# Patient Record
Sex: MALE | Race: BLACK OR AFRICAN AMERICAN | NOT HISPANIC OR LATINO | Employment: FULL TIME | URBAN - METROPOLITAN AREA
[De-identification: names, ages, dates, MRNs, and addresses within clinical notes are randomized per-mention and may not be internally consistent; named-entity substitution may affect disease eponyms.]

---

## 2019-03-25 ENCOUNTER — HISTORICAL (OUTPATIENT)
Dept: ADMINISTRATIVE | Facility: HOSPITAL | Age: 34
End: 2019-03-25

## 2019-03-25 LAB
ABS NEUT (OLG): 4.83 X10(3)/MCL (ref 2.1–9.2)
ALBUMIN SERPL-MCNC: 3.8 GM/DL (ref 3.4–5)
ALBUMIN/GLOB SERPL: 0.7 RATIO (ref 1.1–2)
ALP SERPL-CCNC: 76 UNIT/L (ref 45–117)
ALT SERPL-CCNC: 78 UNIT/L (ref 12–78)
APPEARANCE, UA: CLEAR
AST SERPL-CCNC: 30 UNIT/L (ref 15–37)
BACTERIA #/AREA URNS AUTO: ABNORMAL /[HPF]
BASOPHILS # BLD AUTO: 0.04 X10(3)/MCL
BASOPHILS NFR BLD AUTO: 0 %
BILIRUB SERPL-MCNC: 0.3 MG/DL (ref 0.2–1)
BILIRUB UR QL STRIP: NEGATIVE
BILIRUBIN DIRECT+TOT PNL SERPL-MCNC: <0.1 MG/DL
BILIRUBIN DIRECT+TOT PNL SERPL-MCNC: ABNORMAL MG/DL
BUN SERPL-MCNC: 10 MG/DL (ref 7–18)
CALCIUM SERPL-MCNC: 9.2 MG/DL (ref 8.5–10.1)
CHLORIDE SERPL-SCNC: 100 MMOL/L (ref 98–107)
CHOLEST SERPL-MCNC: 174 MG/DL
CHOLEST/HDLC SERPL: 4.5 {RATIO} (ref 0–5)
CO2 SERPL-SCNC: 30 MMOL/L (ref 21–32)
COLOR UR: ABNORMAL
CREAT SERPL-MCNC: 1 MG/DL (ref 0.6–1.3)
CREAT UR-MCNC: 105 MG/DL
EOSINOPHIL # BLD AUTO: 0.18 10*3/UL
EOSINOPHIL NFR BLD AUTO: 2 %
ERYTHROCYTE [DISTWIDTH] IN BLOOD BY AUTOMATED COUNT: 12.3 % (ref 11.5–14.5)
GLOBULIN SER-MCNC: 5.1 GM/ML (ref 2.3–3.5)
GLUCOSE (UA): >1000 MG/DL
GLUCOSE SERPL-MCNC: 298 MG/DL (ref 74–106)
HCT VFR BLD AUTO: 43.8 % (ref 40–51)
HDLC SERPL-MCNC: 39 MG/DL
HGB BLD-MCNC: 14.4 GM/DL (ref 13.5–17.5)
HGB UR QL STRIP: 0.06 MG/DL
HYALINE CASTS #/AREA URNS LPF: ABNORMAL /[LPF]
IMM GRANULOCYTES # BLD AUTO: 0.05 10*3/UL
IMM GRANULOCYTES NFR BLD AUTO: 0 %
KETONES UR QL STRIP: ABNORMAL
LDLC SERPL CALC-MCNC: 107 MG/DL (ref 0–130)
LEUKOCYTE ESTERASE UR QL STRIP: 500 LEU/UL
LYMPHOCYTES # BLD AUTO: 3.4 X10(3)/MCL
LYMPHOCYTES NFR BLD AUTO: 37 % (ref 13–40)
MCH RBC QN AUTO: 30.1 PG (ref 26–34)
MCHC RBC AUTO-ENTMCNC: 32.9 GM/DL (ref 31–37)
MCV RBC AUTO: 91.6 FL (ref 80–100)
MICROALBUMIN UR-MCNC: 35.5 MG/L (ref 0–19)
MICROALBUMIN/CREAT RATIO PNL UR: 33.8 MCG/MG CR (ref 0–29)
MONOCYTES # BLD AUTO: 0.74 X10(3)/MCL
MONOCYTES NFR BLD AUTO: 8 % (ref 4–12)
NEUTROPHILS # BLD AUTO: 4.83 X10(3)/MCL
NEUTROPHILS NFR BLD AUTO: 52 X10(3)/MCL
NITRITE UR QL STRIP: NEGATIVE
PH UR STRIP: 6.5 [PH] (ref 4.5–8)
PLATELET # BLD AUTO: 285 X10(3)/MCL (ref 130–400)
PMV BLD AUTO: 10.9 FL (ref 7.4–10.4)
POTASSIUM SERPL-SCNC: 4 MMOL/L (ref 3.5–5.1)
PROT SERPL-MCNC: 8.9 GM/DL (ref 6.4–8.2)
PROT UR QL STRIP: 10 MG/DL
RBC # BLD AUTO: 4.78 X10(6)/MCL (ref 4.5–5.9)
RBC #/AREA URNS AUTO: ABNORMAL /[HPF]
SODIUM SERPL-SCNC: 133 MMOL/L (ref 136–145)
SP GR UR STRIP: 1.03 (ref 1–1.03)
SQUAMOUS #/AREA URNS LPF: ABNORMAL /[LPF]
TRIGL SERPL-MCNC: 139 MG/DL
TSH SERPL-ACNC: 2.85 MIU/L (ref 0.36–3.74)
UROBILINOGEN UR STRIP-ACNC: NORMAL
VLDLC SERPL CALC-MCNC: 28 MG/DL
WBC # SPEC AUTO: 9.2 X10(3)/MCL (ref 4.5–11)
WBC #/AREA URNS AUTO: ABNORMAL /HPF

## 2019-05-07 ENCOUNTER — HISTORICAL (OUTPATIENT)
Dept: INTERNAL MEDICINE | Facility: CLINIC | Age: 34
End: 2019-05-07

## 2019-05-07 LAB
APPEARANCE, UA: CLEAR
BACTERIA #/AREA URNS AUTO: ABNORMAL /[HPF]
BILIRUB UR QL STRIP: NEGATIVE
BUN SERPL-MCNC: 12 MG/DL (ref 7–18)
CALCIUM SERPL-MCNC: 9.1 MG/DL (ref 8.5–10.1)
CHLORIDE SERPL-SCNC: 105 MMOL/L (ref 98–107)
CO2 SERPL-SCNC: 27 MMOL/L (ref 21–32)
COLOR UR: YELLOW
CREAT SERPL-MCNC: 1 MG/DL (ref 0.6–1.3)
CREAT UR-MCNC: 228 MG/DL
CREAT/UREA NIT SERPL: 12
GLUCOSE (UA): NORMAL
GLUCOSE SERPL-MCNC: 207 MG/DL (ref 74–106)
HGB UR QL STRIP: 0.06 MG/DL
HYALINE CASTS #/AREA URNS LPF: ABNORMAL /[LPF]
KETONES UR QL STRIP: NEGATIVE
LEUKOCYTE ESTERASE UR QL STRIP: 500 LEU/UL
MICROALBUMIN UR-MCNC: 49.4 MG/L (ref 0–19)
MICROALBUMIN/CREAT RATIO PNL UR: 21.7 MCG/MG CR (ref 0–29)
NITRITE UR QL STRIP: NEGATIVE
PH UR STRIP: 6 [PH] (ref 4.5–8)
POTASSIUM SERPL-SCNC: 4 MMOL/L (ref 3.5–5.1)
PROT UR QL STRIP: 20 MG/DL
RBC #/AREA URNS AUTO: ABNORMAL /[HPF]
SODIUM SERPL-SCNC: 136 MMOL/L (ref 136–145)
SP GR UR STRIP: 1.03 (ref 1–1.03)
SQUAMOUS #/AREA URNS LPF: ABNORMAL /[LPF]
UROBILINOGEN UR STRIP-ACNC: NORMAL
WBC #/AREA URNS AUTO: ABNORMAL /HPF

## 2020-09-28 ENCOUNTER — HISTORICAL (OUTPATIENT)
Dept: ADMINISTRATIVE | Facility: HOSPITAL | Age: 35
End: 2020-09-28

## 2020-09-28 LAB
ABS NEUT (OLG): 3.71 X10(3)/MCL (ref 2.1–9.2)
ALBUMIN SERPL-MCNC: 3.8 GM/DL (ref 3.4–5)
ALBUMIN/GLOB SERPL: 0.7 RATIO (ref 1.1–2)
ALP SERPL-CCNC: 81 UNIT/L (ref 45–117)
ALT SERPL-CCNC: 67 UNIT/L (ref 12–78)
APPEARANCE, UA: ABNORMAL
AST SERPL-CCNC: 34 UNIT/L (ref 15–37)
BACTERIA #/AREA URNS AUTO: ABNORMAL /HPF
BASOPHILS # BLD AUTO: 0 X10(3)/MCL (ref 0–0.2)
BASOPHILS NFR BLD AUTO: 1 %
BILIRUB SERPL-MCNC: 0.3 MG/DL (ref 0.2–1)
BILIRUB UR QL STRIP: NEGATIVE
BILIRUBIN DIRECT+TOT PNL SERPL-MCNC: 0.1 MG/DL (ref 0–0.2)
BILIRUBIN DIRECT+TOT PNL SERPL-MCNC: 0.2 MG/DL
BUN SERPL-MCNC: 10 MG/DL (ref 7–18)
CALCIUM SERPL-MCNC: 8.9 MG/DL (ref 8.5–10.1)
CHLORIDE SERPL-SCNC: 100 MMOL/L (ref 98–107)
CHOLEST SERPL-MCNC: 201 MG/DL
CHOLEST/HDLC SERPL: 4.8 {RATIO} (ref 0–5)
CO2 SERPL-SCNC: 23 MMOL/L (ref 21–32)
COLOR UR: ABNORMAL
CREAT SERPL-MCNC: 1 MG/DL (ref 0.6–1.3)
CREAT UR-MCNC: 69 MG/DL
EOSINOPHIL # BLD AUTO: 0.1 X10(3)/MCL (ref 0–0.9)
EOSINOPHIL NFR BLD AUTO: 1 %
ERYTHROCYTE [DISTWIDTH] IN BLOOD BY AUTOMATED COUNT: 12.7 % (ref 11.5–14.5)
GLOBULIN SER-MCNC: 5.2 GM/ML (ref 2.3–3.5)
GLUCOSE (UA): 1000 MG/DL
GLUCOSE SERPL-MCNC: 372 MG/DL (ref 74–106)
HCT VFR BLD AUTO: 43.6 % (ref 40–51)
HDLC SERPL-MCNC: 42 MG/DL (ref 40–59)
HGB BLD-MCNC: 14.6 GM/DL (ref 13.5–17.5)
HGB UR QL STRIP: 0.2
HYALINE CASTS #/AREA URNS LPF: ABNORMAL /LPF
IMM GRANULOCYTES # BLD AUTO: 0.03 10*3/UL
IMM GRANULOCYTES NFR BLD AUTO: 0 %
KETONES UR QL STRIP: NEGATIVE
LDLC SERPL CALC-MCNC: 132 MG/DL
LEUKOCYTE ESTERASE UR QL STRIP: 500 LEU/UL
LYMPHOCYTES # BLD AUTO: 2.7 X10(3)/MCL (ref 0.6–4.6)
LYMPHOCYTES NFR BLD AUTO: 39 %
MCH RBC QN AUTO: 29.9 PG (ref 26–34)
MCHC RBC AUTO-ENTMCNC: 33.5 GM/DL (ref 31–37)
MCV RBC AUTO: 89.3 FL (ref 80–100)
MICROALBUMIN UR-MCNC: 142 MG/L (ref 0–19)
MICROALBUMIN/CREAT RATIO PNL UR: 205.8 MCG/MG CR (ref 0–29)
MONOCYTES # BLD AUTO: 0.4 X10(3)/MCL (ref 0.1–1.3)
MONOCYTES NFR BLD AUTO: 6 %
NEUTROPHILS # BLD AUTO: 3.71 X10(3)/MCL (ref 2.1–9.2)
NEUTROPHILS NFR BLD AUTO: 53 %
NITRITE UR QL STRIP: NEGATIVE
PH UR STRIP: 5.5 [PH] (ref 4.5–8)
PLATELET # BLD AUTO: 273 X10(3)/MCL (ref 130–400)
PMV BLD AUTO: 10.8 FL (ref 7.4–10.4)
POTASSIUM SERPL-SCNC: 4.1 MMOL/L (ref 3.5–5.1)
PROT SERPL-MCNC: 9 GM/DL (ref 6.4–8.2)
PROT UR QL STRIP: 20 MG/DL
RBC # BLD AUTO: 4.88 X10(6)/MCL (ref 4.5–5.9)
RBC #/AREA URNS AUTO: ABNORMAL /HPF
SODIUM SERPL-SCNC: 133 MMOL/L (ref 136–145)
SP GR UR STRIP: 1.04 (ref 1–1.03)
SQUAMOUS #/AREA URNS LPF: ABNORMAL /LPF
TRIGL SERPL-MCNC: 133 MG/DL
TSH SERPL-ACNC: 1.84 MIU/L (ref 0.36–3.74)
UROBILINOGEN UR STRIP-ACNC: NORMAL
VLDLC SERPL CALC-MCNC: 27 MG/DL
WBC # SPEC AUTO: 7 X10(3)/MCL (ref 4.5–11)
WBC #/AREA URNS AUTO: >=100 /HPF

## 2021-05-12 ENCOUNTER — HISTORICAL (OUTPATIENT)
Dept: INTERNAL MEDICINE | Facility: CLINIC | Age: 36
End: 2021-05-12

## 2021-05-12 LAB
ALBUMIN SERPL-MCNC: 3.7 GM/DL (ref 3.5–5)
ALBUMIN/GLOB SERPL: 0.9 RATIO (ref 1.1–2)
ALP SERPL-CCNC: 75 UNIT/L (ref 40–150)
ALT SERPL-CCNC: 76 UNIT/L (ref 0–55)
AST SERPL-CCNC: 47 UNIT/L (ref 5–34)
BILIRUB SERPL-MCNC: 0.4 MG/DL
BILIRUBIN DIRECT+TOT PNL SERPL-MCNC: 0.2 MG/DL (ref 0–0.5)
BILIRUBIN DIRECT+TOT PNL SERPL-MCNC: 0.2 MG/DL (ref 0–0.8)
BUN SERPL-MCNC: 9.9 MG/DL (ref 8.9–20.6)
CALCIUM SERPL-MCNC: 9.3 MG/DL (ref 8.4–10.2)
CHLORIDE SERPL-SCNC: 101 MMOL/L (ref 98–107)
CO2 SERPL-SCNC: 23 MMOL/L (ref 22–29)
CREAT SERPL-MCNC: 1 MG/DL (ref 0.73–1.18)
EST. AVERAGE GLUCOSE BLD GHB EST-MCNC: >355.1 MG/DL
GLOBULIN SER-MCNC: 4.3 GM/DL (ref 2.4–3.5)
GLUCOSE SERPL-MCNC: 375 MG/DL (ref 74–100)
HBA1C MFR BLD: >14 %
POTASSIUM SERPL-SCNC: 4.5 MMOL/L (ref 3.5–5.1)
PROT SERPL-MCNC: 8 GM/DL (ref 6.4–8.3)
SODIUM SERPL-SCNC: 134 MMOL/L (ref 136–145)

## 2021-07-16 ENCOUNTER — HISTORICAL (OUTPATIENT)
Dept: INTERNAL MEDICINE | Facility: CLINIC | Age: 36
End: 2021-07-16

## 2021-07-16 LAB
ABS NEUT (OLG): 4.24 X10(3)/MCL (ref 2.1–9.2)
ALBUMIN SERPL-MCNC: 3.8 GM/DL (ref 3.5–5)
ALBUMIN/GLOB SERPL: 0.9 RATIO (ref 1.1–2)
ALP SERPL-CCNC: 66 UNIT/L (ref 40–150)
ALT SERPL-CCNC: 49 UNIT/L (ref 0–55)
AST SERPL-CCNC: 27 UNIT/L (ref 5–34)
BASOPHILS # BLD AUTO: 0 X10(3)/MCL (ref 0–0.2)
BASOPHILS NFR BLD AUTO: 0 %
BILIRUB SERPL-MCNC: 0.4 MG/DL
BILIRUBIN DIRECT+TOT PNL SERPL-MCNC: 0.2 MG/DL (ref 0–0.5)
BILIRUBIN DIRECT+TOT PNL SERPL-MCNC: 0.2 MG/DL (ref 0–0.8)
BUN SERPL-MCNC: 9.7 MG/DL (ref 8.9–20.6)
CALCIUM SERPL-MCNC: 9.5 MG/DL (ref 8.4–10.2)
CHLORIDE SERPL-SCNC: 101 MMOL/L (ref 98–107)
CO2 SERPL-SCNC: 28 MMOL/L (ref 22–29)
CREAT SERPL-MCNC: 0.98 MG/DL (ref 0.73–1.18)
EOSINOPHIL # BLD AUTO: 0.2 X10(3)/MCL (ref 0–0.9)
EOSINOPHIL NFR BLD AUTO: 2 %
ERYTHROCYTE [DISTWIDTH] IN BLOOD BY AUTOMATED COUNT: 12.8 % (ref 11.5–14.5)
EST. AVERAGE GLUCOSE BLD GHB EST-MCNC: 274.7 MG/DL
GLOBULIN SER-MCNC: 4.4 GM/DL (ref 2.4–3.5)
GLUCOSE SERPL-MCNC: 220 MG/DL (ref 74–100)
HBA1C MFR BLD: 11.2 %
HCT VFR BLD AUTO: 42.2 % (ref 40–51)
HGB BLD-MCNC: 13.9 GM/DL (ref 13.5–17.5)
IMM GRANULOCYTES # BLD AUTO: 0.02 10*3/UL
IMM GRANULOCYTES NFR BLD AUTO: 0 %
LYMPHOCYTES # BLD AUTO: 4.8 X10(3)/MCL (ref 0.6–4.6)
LYMPHOCYTES NFR BLD AUTO: 48 %
MCH RBC QN AUTO: 29.8 PG (ref 26–34)
MCHC RBC AUTO-ENTMCNC: 32.9 GM/DL (ref 31–37)
MCV RBC AUTO: 90.4 FL (ref 80–100)
MONOCYTES # BLD AUTO: 0.7 X10(3)/MCL (ref 0.1–1.3)
MONOCYTES NFR BLD AUTO: 7 %
NEUTROPHILS # BLD AUTO: 4.24 X10(3)/MCL (ref 2.1–9.2)
NEUTROPHILS NFR BLD AUTO: 43 %
NRBC BLD AUTO-RTO: 0 % (ref 0–0.2)
PLATELET # BLD AUTO: 321 X10(3)/MCL (ref 130–400)
PMV BLD AUTO: 10.5 FL (ref 7.4–10.4)
POTASSIUM SERPL-SCNC: 4.3 MMOL/L (ref 3.5–5.1)
PROT SERPL-MCNC: 8.2 GM/DL (ref 6.4–8.3)
RBC # BLD AUTO: 4.67 X10(6)/MCL (ref 4.5–5.9)
SODIUM SERPL-SCNC: 136 MMOL/L (ref 136–145)
WBC # SPEC AUTO: 9.9 X10(3)/MCL (ref 4.5–11)

## 2022-04-10 ENCOUNTER — HISTORICAL (OUTPATIENT)
Dept: ADMINISTRATIVE | Facility: HOSPITAL | Age: 37
End: 2022-04-10
Payer: MEDICAID

## 2022-04-25 VITALS
DIASTOLIC BLOOD PRESSURE: 80 MMHG | WEIGHT: 315 LBS | OXYGEN SATURATION: 96 % | HEIGHT: 69 IN | BODY MASS INDEX: 46.65 KG/M2 | SYSTOLIC BLOOD PRESSURE: 132 MMHG

## 2022-05-02 ENCOUNTER — TELEPHONE (OUTPATIENT)
Dept: HEPATOLOGY | Facility: HOSPITAL | Age: 37
End: 2022-05-02
Payer: MEDICAID

## 2022-05-02 NOTE — TELEPHONE ENCOUNTER
----- Message from Nydia Saravia LPN sent at 5/2/2022 11:06 AM CDT -----  Regarding: refills  Pt. Asking for refills on insulin needles. Last visit 3/3/22.

## 2022-05-03 NOTE — HISTORICAL [facility] CERNER
This is a historical note converted from [facility]. Formatting and pictures may have been removed.  Please reference [facility] for original formatting and attached multimedia. Chief Complaint  follow up,med refill  History of Present Illness  Pt is a?35 Year?old?AA Male here for f/u visit. PMH HTN, uncontrolled DM, morbid obesity, balanitis/phimosis and noncompliance.?States had eyes checked 2 weeks age at [facility]; consent signed for records. Pt reported to ED on 7/27 for throat pain, was dxd with tonsillitis, completed abx as prescribed and has resolved.?States has been out of?insulin x 1 month and metformin x 3 months. Does not following ADA/dash diet as instructed. Does not check CBGs as instructed. Pt no showed for diabetes education. Declines flu vaccine today. Denies chest pain, shortness of breath,?cough, fever, headache,?dizziness, weakness, abdominal pain, nausea,?vomiting, diarrhea, constipation, dysuria, depression, anxiety, SI/HI.  Review of Systems  Constitutional: negative except as stated in HPI  Eye: negative except as stated in HPI  ENT: negative except as stated in HPI  Respiratory: negative except as stated in HPI  Cardiovascular: negative except as stated in HPI  Gastrointestinal: negative except as stated in HPI  Genitourinary: negative except as stated in HPI  Heme/Lymph: negative except as stated in HPI  Endocrine: negative except as stated in HPI  Immunologic: negative except as stated in HPI  Musculoskeletal: negative except as stated in HPI  Integumentary: negative except as stated in HPI  Neurologic: negative except as stated in HPI  ?   All Other ROS_ negative except as stated in HPI  Physical Exam  Vitals & Measurements  T:?36.9? ?C (Oral)? HR:?78(Peripheral)? RR:?18? BP:?138/87?  HT:?175.00?cm? WT:?140.700?kg? BMI:?45.94?  General: Alert and oriented, No acute distress.  Head: Normocephalic, Atraumatic.  Eye: Pupils are equal, round and reactive to light, Extraocular movements are  intact, Normal conjunctiva, Sclera non-icteric.  Ears/Nose/Mouth/Throat: Normal hearing, Oral mucosa is moist, No pharyngeal erythema.  Neck/Thyroid: Supple, Non-tender, No lymphadenopathy, No thyromegaly, No carotid bruit, No JVD, Full range of motion.  Respiratory: Clear to auscultation bilaterally, Non-labored Respirations, Breath sounds are equal, Symmetrical chest wall expansion, No wheezes, rales or rhonchi.  Cardiovascular: Regular rate and rhythm, Normal S1/S2, No murmurs, rubs or gallops.?Normal peripheral perfusion, No edema.  Gastrointestinal: Soft, morbidly obese, Non-tender, Non-distended, Normal bowel sounds, No palpable organomegaly.  Genitourinary: No costovertebral angle tenderness, No inguinal tenderness. Foreskin is unretractable,?no skin changes.  Musculoskeletal: Normal range of motion, Normal strength, No tenderness, Normal gait.  Integumentary: Warm, Dry, Intact, No suspicious lesions or rashes.  Neurologic: No focal deficits, Cranial Nerves II-XII are grossly intact, Motor strength normal upper and lower extremities, Sensory exam intact.  Psychiatric: Normal interaction, Coherent speech, Appropriate affect.  Feet: 2+ pedal pulses bilaterally.  Assessment/Plan  1.?Uncontrolled diabetes mellitus?E11.65  POC A1C?14.8?not at goal.? Previous A1C?9.8 on 5/7/19.  A1C in 1 month.  Resume metformin 1000 mg BID.  Rx lantus 20 units at bedtime daily.  RX asa 81 mg daily.  Rx atorvastatin 20 mg at bedtime daily.  Re-instructed to check cbg at least BID.  CBG log sheets provided.  Re-referred to nutrition for diabetes education.  Continue medications as prescribed.  Follow ADA diet.  Avoid soda, simple sweets, and limit rice/pasta/bread/starches and consume brown options when possible.?  Maintain healthy weight with BMI goal <30.?  Perform aerobic exercise for 150 minutes per week (or 5 days a week for 30 minutes each day).?  Examine feet daily.?  Obtain annual dilated eye exam.  Eye exam:?9/2020 per pt  ([facility])  Foot exam:?9/28/20  Ordered:  1160F- Medication reconciliation completed during visit, Uncontrolled diabetes mellitus  HTN (hypertension)  Noncompliance  AR (allergic rhinitis)  Wellness examination  Morbid obesity with BMI of 45.0-49.9, adult, [facility], 09/28/20 7:32:00 CDT  Clinic Follow up, *Est. 11/28/20 3:00:00 CST, Order for future visit, Uncontrolled diabetes mellitus  HTN (hypertension)  Noncompliance  Class 3 severe obesity with body mass index (BMI) of 50.0 to 59.9 in adult, [facility]  Comprehensive Metabolic Panel, Routine collect, *Est. 11/28/20 3:00:00 CST, Blood, Order for future visit, *Est. Stop date 11/28/20 3:00:00 CST, Lab Collect, Uncontrolled diabetes mellitus, 09/28/20 7:35:00 CDT  Hemoglobin A1C [facility], Routine collect, *Est. 10/28/20 3:00:00 CDT, Blood, Order for future visit, *Est. Stop date 10/28/20 3:00:00 CDT, Lab Collect, Uncontrolled diabetes mellitus, 09/28/20 7:33:00 CDT  Hemoglobin A1C [facility], Routine collect, *Est. 11/28/20 3:00:00 CST, Blood, Order for future visit, *Est. Stop date 11/28/20 3:00:00 CST, Lab Collect, Uncontrolled diabetes mellitus, 09/28/20 7:35:00 CDT  Office/Outpatient Visit Level 4 Established 36061 PC, Uncontrolled diabetes mellitus  HTN (hypertension)  Noncompliance  AR (allergic rhinitis)  Wellness examination  Morbid obesity with BMI of 45.0-49.9, adult, [facility], 09/28/20 7:32:00 CDT  Urinalysis with Microscopic if Indicated, Routine collect, Urine, Order for future visit, *Est. 11/28/20 3:00:00 CST, *Est. Stop date 11/28/20 3:00:00 CST, Nurse collect, Uncontrolled diabetes mellitus  ?  2.?HTN (hypertension)?I10  D/C norvasc.  Rx lisinopril 5 mg daily.  Follow a low sodium (less than 2 grams of sodium per day), DASH diet.?  Continue medications as prescribed.  Monitor blood pressure and report any consistent values greater than 140/90 and keep a log.  Maintain healthy weight with a BMI goal of <30.?  Aerobic  exercise for 150 minutes per week (or 5 days a week for 30 minutes each day).  Ordered:  1160F- Medication reconciliation completed during visit, Uncontrolled diabetes mellitus  HTN (hypertension)  Noncompliance  AR (allergic rhinitis)  Wellness examination  Morbid obesity with BMI of 45.0-49.9, adult, [facility], 09/28/20 7:32:00 CDT  Clinic Follow up, *Est. 11/28/20 3:00:00 CST, Order for future visit, Uncontrolled diabetes mellitus  HTN (hypertension)  Noncompliance  Class 3 severe obesity with body mass index (BMI) of 50.0 to 59.9 in adult, [facility]  Office/Outpatient Visit Level 4 Established 22153 PC, Uncontrolled diabetes mellitus  HTN (hypertension)  Noncompliance  AR (allergic rhinitis)  Wellness examination  Morbid obesity with BMI of 45.0-49.9, adult, [facility], 09/28/20 7:32:00 CDT  ?  3.?Noncompliance?Z91.19  ?Again, at length discussion with pt regarding continued med noncompliance, i.e. stroke, MI, DKA, loss of sight, limb, renal failure and possible death.  Understanding voiced.  Ordered:  1160F- Medication reconciliation completed during visit, Uncontrolled diabetes mellitus  HTN (hypertension)  Noncompliance  AR (allergic rhinitis)  Wellness examination  Morbid obesity with BMI of 45.0-49.9, adult, [facility], 09/28/20 7:32:00 CDT  Clinic Follow up, *Est. 11/28/20 3:00:00 CST, Order for future visit, Uncontrolled diabetes mellitus  HTN (hypertension)  Noncompliance  Class 3 severe obesity with body mass index (BMI) of 50.0 to 59.9 in adult, [facility]  Office/Outpatient Visit Level 4 Established 19303 PC, Uncontrolled diabetes mellitus  HTN (hypertension)  Noncompliance  AR (allergic rhinitis)  Wellness examination  Morbid obesity with BMI of 45.0-49.9, adult, [facility], 09/28/20 7:32:00 CDT  ?  4.?AR (allergic rhinitis)?J30.9  Avoid/limit triggers such as pollen, dust, molds, and pet dander.?  Avoid smoke, strong chemicals, cleaning products,  perfumes/colognes.  Ordered:  1160F- Medication reconciliation completed during visit, Uncontrolled diabetes mellitus  HTN (hypertension)  Noncompliance  AR (allergic rhinitis)  Wellness examination  Morbid obesity with BMI of 45.0-49.9, adult, [facility] , 09/28/20 7:32:00 CDT  Office/Outpatient Visit Level 4 Established 57192 PC, Uncontrolled diabetes mellitus  HTN (hypertension)  Noncompliance  AR (allergic rhinitis)  Wellness examination  Morbid obesity with BMI of 45.0-49.9, adult, [facility], 09/28/20 7:32:00 CDT  ?  5.?Wellness examination?Z00.00  Vaccinations:???Flu-?declines?/ Pneumonia -?declines / Tetanus?- UTD (1/2019)  Labwork -?ordered; pt is fasting  Ordered:  1160F- Medication reconciliation completed during visit, Uncontrolled diabetes mellitus  HTN (hypertension)  Noncompliance  AR (allergic rhinitis)  Wellness examination  Morbid obesity with BMI of 45.0-49.9, adult, [facility] , 09/28/20 7:32:00 CDT  Office/Outpatient Visit Level 4 Established 00431 PC, Uncontrolled diabetes mellitus  HTN (hypertension)  Noncompliance  AR (allergic rhinitis)  Wellness examination  Morbid obesity with BMI of 45.0-49.9, adult, [facility] , 09/28/20 7:32:00 CDT  ?  6.?Morbid obesity with BMI of 45.0-49.9, adult?E66.01  ?BMI 46. Goal BMI <30.  Aerobic exercise 150 minutes per week.  Avoid soda, simple sugars, sweets, excessive rice, pasta, potatoes or bread.?  Choose brown options when available and portion control.  Limit fast foods and fried foods.?  Choose complex carbs in moderation (ex: green, leafy vegetables, beans, oatmeal).  Eat plenty of fresh fruits and vegetables with lean meats daily.?  Consider permanent healthy lifestyle changes.  Ordered:  1160F- Medication reconciliation completed during visit, Uncontrolled diabetes mellitus  HTN (hypertension)  Noncompliance  AR (allergic rhinitis)  Wellness examination  Morbid obesity with BMI of 45.0-49.9, adult, [facility],  09/28/20 7:32:00 CDT  Clinic Follow up, *Est. 11/28/20 3:00:00 CST, Order for future visit, Uncontrolled diabetes mellitus  HTN (hypertension)  Noncompliance  Class 3 severe obesity with body mass index (BMI) of 50.0 to 59.9 in adult, [facility]  Office/Outpatient Visit Level 4 Established 17816 PC, Uncontrolled diabetes mellitus  HTN (hypertension)  Noncompliance  AR (allergic rhinitis)  Wellness examination  Morbid obesity with BMI of 45.0-49.9, adult, [facility], 09/28/20 7:32:00 CDT  ?  Orders:  insulin glargine, 20 units, Subcutaneous, Once a day (at bedtime), with evening meal rotate injection sites, # 10 mL, 2 Refill(s), Pharmacy: [facility] #[id number], 175, cm, Height/Length Dosing, 09/28/20 7:46:00 CDT, 140.7, kg, Weight Dosing, 09/28/20 7:46:00 CDT  lisinopril, 5 mg = 1 tab(s), Oral, Daily, # 30 tab(s), 2 Refill(s), Pharmacy: [facility] #[id number], 175, cm, Height/Length Dosing, 09/28/20 7:46:00 CDT, 140.7, kg, Weight Dosing, 09/28/20 7:46:00 CDT  metFORMIN, 1,000 mg = 1 tab(s), Oral, BID, # 60 tab(s), 2 Refill(s), Pharmacy: [facility] #[id number], 175, cm, Height/Length Dosing, 09/28/20 7:46:00 CDT, 140.7, kg, Weight Dosing, 09/28/20 7:46:00 CDT  Misc Prescription, Insulin needles, See Instructions, Use to inject lantus at bedtime daily, # 30 EA, 11 Refill(s), Pharmacy: [facility] #[id number], 175, cm, Height/Length Dosing, 09/28/20 7:46:00 CDT, 140.7, kg, Weight Dosing, 09/28/20 7:46:00 CDT  [facility] Prescription, Glucometer, See Instructions, Use to check blood sugar BID, # 1 EA, 0 Refill(s), Pharmacy: [facility] #[id number], 175, cm, Height/Length Dosing, 09/28/20 7:46:00 CDT, 140.7, kg, Weight Dosing, 09/28/20 7:46:00 CDT  Misc Prescription, Glucometer lancets and strips, See Instructions, Use to check blood glucose BID, # 100 EA, 11 Refill(s), Pharmacy: [facility] #[id number], 175, cm, Height/Length Dosing, 09/28/20 7:46:00 CDT, 140.7, kg, Weight Dosing,  09/28/20 7:46:00 CDT  rosuvastatin, 10 mg = 1 tab(s), Oral, Once a day (at bedtime), # 30 tab(s), 2 Refill(s), Pharmacy: [facility] #[id number], 175, cm, Height/Length Dosing, 09/28/20 7:46:00 CDT, 140.7, kg, Weight Dosing, 09/28/20 7:46:00 CDT  Urine Culture [id number], Routine collect, 09/28/20 8:59:00 CDT, Urine, Collected, Nurse collect, Urine, [id number], Stop date 09/28/20 8:59:00 CDT, ~INHERIT, Diabetes mellitus  Will call with abn lab results.  A1C in 1 month.  RTC 2 months and prn. Labs one week prior to appt.  Referrals  [facility] Internal Referral to Diabetes Education, Specialty: Diabetes Education, Reason: uncontrolled diabetes, Refer To: Provider Not Specified, [facility], [address] [place] [address]., Start: 09/28/20 7:33:00 CDT  [facility] Internal Referral to Ophthalmology [facility], Specialty: Ophthalmology, Reason: Fundus Exam, Refer To: Glen LOPEZ, Primo HINTON, [facility], [address] [place] [id number]., Start: 09/28/20 7:36:00 CDT  Clinic Follow up, *Est. 11/28/20 3:00:00 CST, Order for future visit, Uncontrolled diabetes mellitus  HTN (hypertension)  Noncompliance  Class 3 severe obesity with body mass index (BMI) of 50.0 to 59.9 in adult, [facility]   Problem List/Past Medical History  Ongoing  AR (allergic rhinitis)  HTN (hypertension)  Morbid obesity with BMI of 45.0-49.9, adult  Noncompliance  Uncontrolled diabetes mellitus  Historical  No qualifying data  Procedure/Surgical History  None   Medications  aspirin 81 mg oral tablet, 81 mg= 1 tab(s), Oral, Daily,? ?Not taking: never taking Last Dose Date/Time Unknown  Crestor 10 mg oral tablet, 10 mg= 1 tab(s), Oral, Once a day (at bedtime), 2 refills  Glucometer, See Instructions  Glucometer lancets and strips, See Instructions, 11 refills  Insulin needles, See Instructions, 11 refills  Lantus 100 units/mL subcutaneous solution, 20 units, Subcutaneous, Once a day (at bedtime), 2  refills  lisinopril 5 mg oral tablet, 5 mg= 1 tab(s), Oral, Daily, 2 refills  metFORMIN 1000 mg oral tablet, 1000 mg= 1 tab(s), Oral, BID, 2 refills  Allergies  No Known Allergies  No Known Medication Allergies  Social History  Abuse/Neglect  No, No, Yes, 09/28/2020  Alcohol  Never, 09/25/2018  Employment/School  Employed, 05/07/2019  Exercise  Exercise duration: 60. Exercise frequency: 1-2 times/week. Exercise type: Walking., 05/07/2019  Home/Environment  Lives with Mother. Living situation: Home/Independent. Home equipment: Glucose monitoring. Alcohol abuse in household: No. Substance abuse in household: No. Smoker in household: No. Feels unsafe at home: No. Safe place to go: Yes. Family/Friends available for support: Yes., 05/07/2019    Never in , 09/28/2020  Nutrition/Health  no season, 05/07/2019  Sexual  Spiritual/Cultural  Non denomination, Yes, 09/28/2020  Substance Use  Never, 09/25/2018  Tobacco  Never (less than 100 in lifetime), No, Household tobacco concerns: No. Smokeless Tobacco Use: Never., 09/28/2020  Family History  Cardiac arrhythmia.: Negative: Mother.  DM (diabetes mellitus): Mother.  Hypertension.: Mother.  Father: History is negative  Immunizations  Vaccine Date Status   tetanus/diphtheria/pertussis, acel(Tdap) 01/11/2019 Given   hepatitis B pediatric vaccine 05/18/2005 Recorded   hepatitis B pediatric vaccine 01/31/2005 Recorded   hepatitis B pediatric vaccine 03/25/1999 Recorded   hepatitis B pediatric vaccine 12/03/1998 Recorded   hepatitis B pediatric vaccine 10/29/1998 Recorded   measles/mumps/rubella virus vaccine 07/23/1992 Recorded   poliovirus vaccine, inactivated 03/12/1992 Recorded   measles/mumps/rubella virus vaccine 03/12/1992 Recorded   measles/mumps/rubella virus vaccine 10/07/1991 Recorded   measles/mumps/rubella virus vaccine 12/16/1988 Recorded   poliovirus vaccine, inactivated 03/08/1988 Recorded   poliovirus vaccine, inactivated 1987 Recorded   Health  Maintenance  Health Maintenance  ???Pending?(in the next year)  ??? ??OverDue  ??? ? ? ?Diabetes Maintenance-Microalbumin due??and every?  ??? ??Due In Future?  ??? ? ? ?Obesity Screening not due until??01/01/21??and every 1??year(s)  ??? ? ? ?Alcohol Misuse Screening not due until??01/02/21??and every 1??year(s)  ??? ? ? ?Diabetes Maintenance-Eye Exam not due until??03/25/21??and every 2??year(s)  ???Satisfied?(in the past 1 year)  ??? ??Satisfied?  ??? ? ? ?ADL Screening on??09/28/20.??Satisfied by Day LPN, Niharika S.  ??? ? ? ?Alcohol Misuse Screening on??09/28/20.??Satisfied by Marjorie Hanson NP  ??? ? ? ?Blood Pressure Screening on??09/28/20.??Satisfied by Day LPN, Niharika S.  ??? ? ? ?Body Mass Index Check on??09/28/20.??Satisfied by Day LPN, Niharika S.  ??? ? ? ?Depression Screening on??09/28/20.??Satisfied by Day LPN, Niharika S.  ??? ? ? ?Diabetes Maintenance-Fasting Lipid Profile on??09/28/20.??Satisfied by Hai Flores  ??? ? ? ?Diabetes Maintenance-Serum Creatinine on??09/28/20.??Satisfied by Hai Flores  ??? ? ? ?Diabetes Maintenance-Microalbumin on??09/28/20.??Satisfied by Hai Flores  ??? ? ? ?Diabetes Maintenance-Foot Exam on??09/28/20.??Satisfied by Marjorie Hanson NP  ??? ? ? ?Diabetes Maintenance-HgbA1c on??09/28/20.??Satisfied by Day LPN, Niharika S.  ??? ? ? ?Diabetes Screening on??09/28/20.??Satisfied by Hai Flores  ??? ? ? ?Hypertension Management-BMP on??09/28/20.??Satisfied by Hai Flores  ??? ? ? ?Hypertension Management-Blood Pressure on??09/28/20.??Satisfied by Day Niharika CHARLES  ??? ? ? ?Hypertension Management-Education on??09/28/20.??Satisfied by Valentin WEBER, Marjorie Olsen  ??? ? ? ?Obesity Screening on??09/28/20.??Satisfied by Day Niharika CHARLES  ??? ??Refused?  ??? ? ? ?Influenza Vaccine on??09/28/20.??Recorded by Niharika Phipps LPN??Reason: Patient Refuses  ?  Lab Results  Test Name Test Result Date/Time Comments   Sodium Lvl 133 mmol/L (Low) 09/28/2020 09:00 CDT     Potassium Lvl 4.1 mmol/L 09/28/2020 09:00 CDT    Chloride 100 mmol/L 09/28/2020 09:00 CDT    CO2 23 mmol/L 09/28/2020 09:00 CDT    Calcium Lvl 8.9 mg/dL 09/28/2020 09:00 CDT    Glucose Lvl 372 mg/dL (High) 09/28/2020 09:00 CDT    BUN 10 mg/dL 09/28/2020 09:00 CDT    Creatinine 1.00 mg/dL 09/28/2020 09:00 CDT    eGFR-AA >105 mL/min 09/28/2020 09:00 CDT    Bili Total 0.3 mg/dL 09/28/2020 09:00 CDT    Bili Direct 0.1 mg/dL 09/28/2020 09:00 CDT    Bili Indirect 0.2 mg/dL 09/28/2020 09:00 CDT    AST 34 unit/L 09/28/2020 09:00 CDT    ALT 67 unit/L 09/28/2020 09:00 CDT    Alk Phos 81 unit/L 09/28/2020 09:00 CDT    Total Protein 9.0 gm/dL (High) 09/28/2020 09:00 CDT    Albumin Lvl 3.8 gm/dL 09/28/2020 09:00 CDT    Globulin 5.20 gm/mL (High) 09/28/2020 09:00 CDT    A/G Ratio 0.7 ratio (Low) 09/28/2020 09:00 CDT    Hgb A1C POC 14.8 09/28/2020 07:24 CDT    Chol 201 mg/dL (High) 09/28/2020 09:00 CDT    HDL 42 mg/dL 09/28/2020 09:00 CDT    Trig 133 mg/dL 09/28/2020 09:00 CDT     mg/dL (High) 09/28/2020 09:00 CDT    Chol/HDL 4.8 09/28/2020 09:00 CDT    VLDL 27 mg/dL 09/28/2020 09:00 CDT    TSH 1.843 mIU/L 09/28/2020 09:00 CDT    U Creatinine 69.0 mg/dL 09/28/2020 08:59 CDT No established reference range available   U Microalb 142.0 mg/L (High) 09/28/2020 08:59 CDT    Microalb/Creat 205.8 mcg/mg Cr (High) 09/28/2020 08:59 CDT    WBC 7.0 x10(3)/mcL 09/28/2020 09:00 CDT    RBC 4.88 x10(6)/mcL 09/28/2020 09:00 CDT    Hgb 14.6 gm/dL 09/28/2020 09:00 CDT    Hct 43.6 % 09/28/2020 09:00 CDT    Platelet 273 x10(3)/mcL 09/28/2020 09:00 CDT    MCV 89.3 fL 09/28/2020 09:00 CDT    MCH 29.9 pg 09/28/2020 09:00 CDT    MCHC 33.5 gm/dL 09/28/2020 09:00 CDT    RDW 12.7 % 09/28/2020 09:00 CDT    MPV 10.8 fL (High) 09/28/2020 09:00 CDT    Abs Neut 3.71 x10(3)/mcL 09/28/2020 09:00 CDT    Neutro Auto 53 % 09/28/2020 09:00 CDT    Lymph Auto 39 % 09/28/2020 09:00 CDT    Mono Auto 6 % 09/28/2020 09:00 CDT    Eos Auto 1 % 09/28/2020 09:00 CDT     Abs Eos 0.1 x10(3)/mcL 09/28/2020 09:00 CDT    Basophil Auto 1 % 09/28/2020 09:00 CDT    Abs Neutro 3.71 x10(3)/mcL 09/28/2020 09:00 CDT    Abs Lymph 2.7 x10(3)/mcL 09/28/2020 09:00 CDT    Abs Mono 0.4 x10(3)/mcL 09/28/2020 09:00 CDT    Abs Baso 0.0 x10(3)/mcL 09/28/2020 09:00 CDT    IG% 0 % 09/28/2020 09:00 CDT    IG# 0.0300 09/28/2020 09:00 CDT    UA Appear Hazy 09/28/2020 08:59 CDT    UA Color LIGHT YELLOW 09/28/2020 08:59 CDT    UA Spec Grav 1.037 (High) 09/28/2020 08:59 CDT    UA Bili Negative 09/28/2020 08:59 CDT    UA pH 5.5 09/28/2020 08:59 CDT    UA Urobilinogen Normal 09/28/2020 08:59 CDT    UA Blood 0.2 09/28/2020 08:59 CDT    UA Glucose 1000 (Abnormal) 09/28/2020 08:59 CDT    UA Ketones Negative 09/28/2020 08:59 CDT    UA Protein 20 (Abnormal) 09/28/2020 08:59 CDT    UA Nitrite Negative 09/28/2020 08:59 CDT    UA Leuk Est 500 (Abnormal) 09/28/2020 08:59 CDT    UA WBC Interp >=100 (Abnormal) 09/28/2020 08:59 CDT    UA RBC Interp 6-10 (Abnormal) 09/28/2020 08:59 CDT    UA Bact Interp None Seen 09/28/2020 08:59 CDT    UA Squam Epi Interp  (Abnormal) 09/28/2020 08:59 CDT

## 2022-05-03 NOTE — HISTORICAL [facility] CERNER
This is a historical note converted from Jacqueline. Formatting and pictures may have been removed.  Please reference Jacqueline for original formatting and attached multimedia. Chief Complaint  needs PCP  History of Present Illness  Pt is a?33 Year?old?AA Male here to establish PCP in [facility] [place]. PMH HTN, DM, morbid obesity, and balanitis/phimosis.??Was seen in [facility] for candidial balanitis on 1/30/19 and dxd with DM and HTN at that time. 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 Denies any?psych issues at this time. States has a glucometer but does not check regularly or know what the number means; does not recall readings. States pain when checking cbg; stressed importance of cbg compliance. Reports med compliance with diabetes meds only; does not follow diabetic diet or exercise. Quit taking lisinopril d/t feeling funny. Has not taken lisinopril since mid February. Reports eczema on my feet. Declines flu and tetanus UTD (1/11/19). Denies chest pain, shortness of breath,?cough, fever, headache,?dizziness, weakness, abdominal pain, nausea,?vomiting, diarrhea, constipation, dysuria, depression, anxiety, SI/HI.  Review of Systems  Constitutional: negative except as stated in HPI  Eye: negative except as stated in HPI  ENT: negative except as stated in HPI  Respiratory: negative except as stated in HPI  Cardiovascular: negative except as stated in HPI  Gastrointestinal: negative except as stated in HPI  Genitourinary: negative except as stated in HPI  Heme/Lymph: negative except as stated in HPI  Endocrine: negative except as stated in HPI  Immunologic: negative except as stated in HPI  Musculoskeletal: negative except as stated in HPI  Integumentary: negative except as stated in HPI  Neurologic: negative except as stated in HPI  ?   All Other ROS_ negative except as  stated in HPI  Physical Exam  Vitals & Measurements  T:?36.7? ?C (Oral)? HR:?86(Peripheral)? RR:?18? BP:?137/85?  HT:?175?cm? WT:?153.8?kg? BMI:?50.22?  General: Alert and oriented, No acute distress.  Head: Normocephalic, Atraumatic.  Eye: Pupils are equal, round and reactive to light, Extraocular movements are intact, Normal conjunctiva, Sclera non-icteric.  Ears/Nose/Mouth/Throat: Normal hearing, Oral mucosa is moist, No pharyngeal erythema.  Neck/Thyroid: Supple, Non-tender, No lymphadenopathy, No thyromegaly, No carotid bruit, No JVD, Full range of motion.  Respiratory: Clear to auscultation bilaterally, Non-labored Respirations, Breath sounds are equal, Symmetrical chest wall expansion, No wheezes, rales or rhonchi.  Cardiovascular: Regular rate and rhythm, Normal S1/S2, No murmurs, rubs or gallops.?Normal peripheral perfusion, No edema.  Gastrointestinal: Soft, morbidly obese, Non-tender, Non-distended, Normal bowel sounds, No palpable organomegaly.  Genitourinary: No costovertebral angle tenderness, No inguinal tenderness. Foreskin is unretractable,?no skin changes.  Musculoskeletal: Normal range of motion, Normal strength, No tenderness, Normal gait.  Integumentary: Warm, Dry, Intact, No suspicious lesions or rashes.  Neurologic: No focal deficits, Cranial Nerves II-XII are grossly intact, Motor strength normal upper and lower extremities, Sensory exam intact.  Psychiatric: Normal interaction, Coherent speech, Euthymic mood, Appropriate affect.  Feet: Circular, white, flaky rash noted to bilateral anterior feet, from great toe to little toe. 2+ pedal pulses bilaterally.  Assessment/Plan  1.?Class 3 severe obesity with body mass index (BMI) of 50.0 to 59.9 in adult?E66.01  ?BMI 50. Goal BMI <30.  Aerobic exercise 150 minutes per week.  Avoid soda, simple sugars, sweets, excessive rice, pasta, potatoes or bread.?  Choose brown options when available and portion control.  Limit fast foods and fried  foods.?  Choose complex carbs in moderation (ex: green, leafy vegetables, beans, oatmeal).  Eat plenty of fresh fruits and vegetables with lean meats daily.?  Consider permanent healthy lifestyle changes.  Ordered:  1160F- Medication reconciliation completed during visit, Class 3 severe obesity with body mass index (BMI) of 50.0 to 59.9 in adult  HTN (hypertension)  Diabetes mellitus  Phimosis  AR (allergic rhinitis)  Wellness examination, [facility], 03/25/19 7:32:00 CDT  Clinic Follow up, *Est. 05/06/19 3:00:00 CDT, Order for future visit, Class 3 severe obesity with body mass index (BMI) of 50.0 to 59.9 in adult  HTN (hypertension)  Diabetes mellitus  AR (allergic rhinitis)  Wellness examination, [facility]  Office/Outpatient Visit Level 4 Established [id number] PC, Class 3 severe obesity with body mass index (BMI) of 50.0 to 59.9 in adult  HTN (hypertension)  Diabetes mellitus  Phimosis  AR (allergic rhinitis)  Wellness examination, [facility], 03/25/19 7:32:00 CDT  ?  2.?HTN (hypertension)?I10  ?D/C lisinopril.  Rx norvasc 5 mg daily.  Nurse visit for bp check in 2 weeks.  Follow a low sodium (less than 2 grams of sodium per day), DASH diet.?  Continue medications as prescribed.  Monitor blood pressure and report any consistent values greater than 140/90 and keep a log.  Encouraged?smoking cessation to aid in BP reduction and co-morbities.?  Maintain healthy weight with a BMI goal of <30.?  Aerobic exercise for 150 minutes per week (or 5 days a week for 30 minutes each day).  Ordered:  1160F- Medication reconciliation completed during visit, Class 3 severe obesity with body mass index (BMI) of 50.0 to 59.9 in adult  HTN (hypertension)  Diabetes mellitus  Phimosis  AR (allergic rhinitis)  Wellness examination, [facility], 03/25/19 7:32:00 CDT  CBC w/ Auto Diff, Routine collect, *Est. 03/25/19 3:00:00 CDT, Blood, Order for future visit, *Est. Stop date 03/25/19 3:00:00 CDT, Lab Collect,  HTN (hypertension), 03/25/19 7:32:00 CDT  Clinic Follow up, *Est. 05/06/19 3:00:00 CDT, Order for future visit, Class 3 severe obesity with body mass index (BMI) of 50.0 to 59.9 in adult  HTN (hypertension)  Diabetes mellitus  AR (allergic rhinitis)  Wellness examination, [facility]  Comprehensive Metabolic Panel, Routine collect, *Est. 03/25/19 3:00:00 CDT, Blood, Order for future visit, *Est. Stop date 03/25/19 3:00:00 CDT, Lab Collect, HTN (hypertension)  Diabetes mellitus, 03/25/19 7:32:00 CDT  Lipid Panel, Routine collect, *Est. 03/25/19 3:00:00 CDT, Blood, Order for future visit, *Est. Stop date 03/25/19 3:00:00 CDT, Lab Collect, HTN (hypertension)  Diabetes mellitus, 03/25/19 7:32:00 CDT  Office/Outpatient Visit Level 4 Established [id number] PC, Class 3 severe obesity with body mass index (BMI) of 50.0 to 59.9 in adult  HTN (hypertension)  Diabetes mellitus  Phimosis  AR (allergic rhinitis)  Wellness examination, [facility], 03/25/19 7:32:00 CDT  Thyroid Stimulating Hormone, Routine collect, *Est. 03/25/19 3:00:00 CDT, Blood, Order for future visit, *Est. Stop date 03/25/19 3:00:00 CDT, Lab Collect, HTN (hypertension), 03/25/19 7:32:00 CDT  Urinalysis with Microscopic if Indicated, Routine collect, Urine, Order for future visit, *Est. 03/25/19 3:00:00 CDT, *Est. Stop date 03/25/19 3:00:00 CDT, Nurse collect, HTN (hypertension)  Diabetes mellitus  ?  3.?Diabetes mellitus?E11.9  ?A1C?11.7 not at goal.? Previous A1C?12.4.?  Increase metformin to 1000 mg BID.  Increase basaglar to 20 units at bedtime.  Instructed to check cbg at least BID.  Referred to nutrition for diabetes education.  Continue medications as prescribed.  Follow ADA diet.  Avoid soda, simple sweets, and limit rice/pasta/bread/starches and consume brown options when possible.?  Maintain healthy weight with BMI goal <30.?  Perform aerobic exercise for 150 minutes per week (or 5 days a week for 30 minutes each day).?  Examine feet  daily.?  Obtain annual dilated eye exam.  Eye exam: 3/25/19  Foot exam: 3/25/19  Ordered:  1160F- Medication reconciliation completed during visit, Class 3 severe obesity with body mass index (BMI) of 50.0 to 59.9 in adult  HTN (hypertension)  Diabetes mellitus  Phimosis  AR (allergic rhinitis)  Wellness examination, [facility], 03/25/19 7:32:00 CDT  Clinic Follow up, *Est. 05/06/19 3:00:00 CDT, Order for future visit, Class 3 severe obesity with body mass index (BMI) of 50.0 to 59.9 in adult  HTN (hypertension)  Diabetes mellitus  AR (allergic rhinitis)  Wellness examination, [facility]  Comprehensive Metabolic Panel, Routine collect, *Est. 03/25/19 3:00:00 CDT, Blood, Order for future visit, *Est. Stop date 03/25/19 3:00:00 CDT, Lab Collect, HTN (hypertension)  Diabetes mellitus, 03/25/19 7:32:00 CDT  Internal Referral to Nutrition, diabetes education, *Est. 04/25/19 3:00:00 CDT, Future Visit?, Diabetes mellitus  Internal Referral to [facility], diabetes fundus exam, *Est. 03/25/19 8:15:00 CDT, Future Visit?, Diabetes mellitus  Lipid Panel, Routine collect, *Est. 03/25/19 3:00:00 CDT, Blood, Order for future visit, *Est. Stop date 03/25/19 3:00:00 CDT, Lab Collect, HTN (hypertension)  Diabetes mellitus, 03/25/19 7:32:00 CDT  Microalbum/Creatinine Ratio Urine (Microalb/Creat), Routine collect, Urine, Order for future visit, *Est. 03/25/19 3:00:00 CDT, *Est. Stop date 03/25/19 3:00:00 CDT, Nurse collect, Diabetes mellitus  Office/Outpatient Visit Level 4 Established [id number] PC, Class 3 severe obesity with body mass index (BMI) of 50.0 to 59.9 in adult  HTN (hypertension)  Diabetes mellitus  Phimosis  AR (allergic rhinitis)  Wellness examination, [facility], 03/25/19 7:32:00 CDT  Urinalysis with Microscopic if Indicated, Routine collect, Urine, Order for future visit, *Est. 03/25/19 3:00:00 CDT, *Est. Stop date 03/25/19 3:00:00 CDT, Nurse collect, HTN (hypertension)  Diabetes  mellitus  ?  4.?Phimosis?N47.1  ?Previously referred to urology clinic.  Keep apt when scheduled.  Ordered:  1160F- Medication reconciliation completed during visit, Class 3 severe obesity with body mass index (BMI) of 50.0 to 59.9 in adult  HTN (hypertension)  Diabetes mellitus  Phimosis  AR (allergic rhinitis)  Wellness examination, [facility], 03/25/19 7:32:00 CDT  Office/Outpatient Visit Level 4 Established [id number] , Class 3 severe obesity with body mass index (BMI) of 50.0 to 59.9 in adult  HTN (hypertension)  Diabetes mellitus  Phimosis  AR (allergic rhinitis)  Wellness examination, [facility], 03/25/19 7:32:00 CDT  ?  5.?AR (allergic rhinitis)?J30.9  Avoid/limit triggers such as pollen, dust, molds, and pet dander.?  Avoid smoke, strong chemicals, cleaning products, perfumes/colognes.  Use rescue inhaler as needed, use nebulizer for wheezing.?  Ordered:  1160F- Medication reconciliation completed during visit, Class 3 severe obesity with body mass index (BMI) of 50.0 to 59.9 in adult  HTN (hypertension)  Diabetes mellitus  Phimosis  AR (allergic rhinitis)  Wellness examination, [facility], 03/25/19 7:32:00 CDT  Clinic Follow up, *Est. 05/06/19 3:00:00 CDT, Order for future visit, Class 3 severe obesity with body mass index (BMI) of 50.0 to 59.9 in adult  HTN (hypertension)  Diabetes mellitus  AR (allergic rhinitis)  Wellness examination, [facility]  Office/Outpatient Visit Level 4 Established [id number] , Class 3 severe obesity with body mass index (BMI) of 50.0 to 59.9 in adult  HTN (hypertension)  Diabetes mellitus  Phimosis  AR (allergic rhinitis)  Wellness examination, [facility], 03/25/19 7:32:00 CDT  ?  6.?Wellness examination?Z00.00  Vaccinations: ?Flu - declines Tetanus?- UTD (1/2019)  Labwork -?Today??  Ordered:  1160F- Medication reconciliation completed during visit, Class 3 severe obesity with body mass index (BMI) of 50.0 to 59.9 in adult  HTN (hypertension)   Diabetes mellitus  Phimosis  AR (allergic rhinitis)  Wellness examination, [facility], 03/25/19 7:32:00 CDT  Clinic Follow up, *Est. 05/06/19 3:00:00 CDT, Order for future visit, Class 3 severe obesity with body mass index (BMI) of 50.0 to 59.9 in adult  HTN (hypertension)  Diabetes mellitus  AR (allergic rhinitis)  Wellness examination, [facility]  Office/Outpatient Visit Level 4 Established [id number] PC, Class 3 severe obesity with body mass index (BMI) of 50.0 to 59.9 in adult  HTN (hypertension)  Diabetes mellitus  Phimosis  AR (allergic rhinitis)  Wellness examination, [facility], 03/25/19 7:32:00 CDT  ?  7.?Tinea pedis?B35.3  ?Rx clotrimazole topical x 21 days.  Keep area clean and dry.  ?  Orders:  amLODIPine, 5 mg = 1 tab(s), Oral, Daily, # 90 tab(s), 1 Refill(s), Pharmacy: [id number]  clotrimazole topical, 1 al, TOP, BID, # 100 gm, 1 Refill(s), Pharmacy: [facility] [id number]  insulin glargine, 20 units, Subcutaneous, Once a day (at bedtime), rotate injection sites with evening meal, # 15 mL, 5 Refill(s), Pharmacy: [facility] [id number]  metFORMIN, 1,000 mg = 1 tab(s), Oral, BID, # 180 tab(s), 0 Refill(s), Pharmacy: [facility] [id number]  triamcinolone topical, 1 al, TOP, TID, PRN PRN itching, # 60 gm, 2 Refill(s), Pharmacy: [id number]  Will call with lab results.  RTC 6 weeks and prn. Labs prior to next appt.   Problem List/Past Medical History  Ongoing  AR (allergic rhinitis)  Class 3 severe obesity with body mass index (BMI) of 50.0 to 59.9 in adult  Diabetes mellitus  HTN (hypertension)  Historical  No qualifying data  Procedure/Surgical History  None   Medications  Basaglar KwikPen 100 units/mL subcutaneous solution, 20 units, Subcutaneous, Once a day (at bedtime), 5 refills  clotrimazole 1% topical cream, 1 al, TOP, BID, 1 refills  Glucometer, See Instructions,? ?Still taking, not as prescribed  metFORMIN 1000 mg oral tablet, 1000 mg=  1 tab(s), Oral, BID  Norvasc 5 mg oral tablet, 5 mg= 1 tab(s), Oral, Daily, 1 refills  triamcinolone 0.1% topical cream, 1 al, TOP, TID, PRN, 2 refills  Ventolin HFA 90 mcg/inh inhalation aerosol, 2 puff(s), INH, q6hr, PRN, 2 refills,? ?Not taking  Allergies  No Known Allergies  No Known Medication Allergies  Social History  Alcohol  Never, 09/25/2018  Substance Abuse  Never, 09/25/2018  Tobacco  Never (less than 100 in lifetime), N/A, 03/25/2019  Family History  Cardiac arrhythmia.: Negative: Mother.  DM (diabetes mellitus): Mother.  Hypertension.: Mother.  Immunizations  Vaccine Date Status   tetanus/diphtheria/pertussis, acel(Tdap) 01/11/2019 Given   hepatitis B pediatric vaccine 05/18/2005 Recorded   hepatitis B pediatric vaccine 01/31/2005 Recorded   hepatitis B pediatric vaccine 03/25/1999 Recorded   hepatitis B pediatric vaccine 12/03/1998 Recorded   hepatitis B pediatric vaccine 10/29/1998 Recorded   measles/mumps/rubella virus vaccine 07/23/1992 Recorded   poliovirus vaccine, inactivated 03/12/1992 Recorded   measles/mumps/rubella virus vaccine 03/12/1992 Recorded   measles/mumps/rubella virus vaccine 10/07/1991 Recorded   measles/mumps/rubella virus vaccine 12/16/1988 Recorded   poliovirus vaccine, inactivated 03/08/1988 Recorded   poliovirus vaccine, inactivated 1987 Recorded   Health Maintenance  Health Maintenance  ???Pending?(in the next year)  ??? ??Due?  ??? ? ? ?Diabetes Maintenance-Eye Exam due??03/25/19??and every?  ??? ? ? ?Diabetes Maintenance-Fasting Lipid Profile due??03/25/19??and every?  ??? ? ? ?Diabetes Maintenance-Foot Exam due??03/25/19??and every?  ??? ? ? ?Diabetes Maintenance-Microalbumin due??03/25/19??Variable frequency  ??? ? ? ?Diabetes Maintenance-Urine Dipstick due??03/25/19??Variable frequency  ??? ??Due In Future?  ??? ? ? ?Diabetes Maintenance-Serum Creatinine not due until??02/16/20??and every 1??year(s)  ??? ? ? ?Hypertension Management-BMP not due  until??02/17/20??and every 1??year(s)  ??? ? ? ?Blood Pressure Screening not due until??03/24/20??and every 1??year(s)  ??? ? ? ?Body Mass Index Check not due until??03/24/20??and every 1??year(s)  ??? ? ? ?Depression Screening not due until??03/24/20??and every 1??year(s)  ??? ? ? ?Diabetes Maintenance-HgbA1c not due until??03/24/20??and every 1??year(s)  ??? ? ? ?Hypertension Management-Blood Pressure not due until??03/24/20??and every 1??year(s)  ???Satisfied?(in the past 1 year)  ??? ??Satisfied?  ??? ? ? ?ADL Screening on??03/25/19.??Satisfied by Brenda Hester LPN  ??? ? ? ?Alcohol Misuse Screening on??03/25/19.??Satisfied by Marjorie Hanson NP  ??? ? ? ?Blood Pressure Screening on??03/25/19.??Satisfied by Brenda Hester LPN  ??? ? ? ?Body Mass Index Check on??03/25/19.??Satisfied by Brenda Hester LPN  ??? ? ? ?Depression Screening on??03/25/19.??Satisfied by Brenda Hester LPN  ??? ? ? ?Diabetes Maintenance-HgbA1c on??03/25/19.??Satisfied by Brenda Hester LPN  ??? ? ? ?Diabetes Screening on??02/16/19.??Satisfied by Jessika Spann  ??? ? ? ?Hypertension Management-Education on??03/25/19.??Satisfied by Marjorie Hanson NP  ??? ? ? ?Influenza Vaccine on??03/12/19.??Satisfied by Viktoria Colin MA  ??? ? ? ?Obesity Screening on??03/25/19.??Satisfied by Brenda Hester LPN  ??? ? ? ?Tetanus Vaccine on??01/11/19.??Satisfied by Viviana VAZQUEZ, Gini PARK  ??? ??Canceled?  ??? ? ? ?Smoking Cessation on??03/25/19.?Recorded by Valentin WEBER, Marjorie Olsen  ?  ?  Lab Results  Test Name Test Result Date/Time   Hgb A1C POC 11.7% 03/25/2019 07:44 CDT

## 2022-06-10 RX ORDER — CALCIUM CITRATE/VITAMIN D3 200MG-6.25
TABLET ORAL
Qty: 100 STRIP | Refills: 5 | Status: SHIPPED | OUTPATIENT
Start: 2022-06-10

## 2022-06-10 RX ORDER — LANCETS 33 GAUGE
EACH MISCELLANEOUS
Qty: 100 EACH | Refills: 5 | Status: SHIPPED | OUTPATIENT
Start: 2022-06-10

## 2022-06-28 ENCOUNTER — OFFICE VISIT (OUTPATIENT)
Dept: URGENT CARE | Facility: CLINIC | Age: 37
End: 2022-06-28
Payer: MEDICAID

## 2022-06-28 VITALS
OXYGEN SATURATION: 99 % | HEART RATE: 91 BPM | RESPIRATION RATE: 18 BRPM | BODY MASS INDEX: 46.18 KG/M2 | SYSTOLIC BLOOD PRESSURE: 136 MMHG | TEMPERATURE: 98 F | DIASTOLIC BLOOD PRESSURE: 90 MMHG | HEIGHT: 69 IN | WEIGHT: 311.81 LBS

## 2022-06-28 DIAGNOSIS — L02.92 FURUNCLE: Primary | ICD-10-CM

## 2022-06-28 PROCEDURE — 99203 OFFICE O/P NEW LOW 30 MIN: CPT | Mod: S$PBB,,, | Performed by: FAMILY MEDICINE

## 2022-06-28 PROCEDURE — 99214 OFFICE O/P EST MOD 30 MIN: CPT | Mod: PBBFAC | Performed by: FAMILY MEDICINE

## 2022-06-28 PROCEDURE — 99203 PR OFFICE/OUTPT VISIT, NEW, LEVL III, 30-44 MIN: ICD-10-PCS | Mod: S$PBB,,, | Performed by: FAMILY MEDICINE

## 2022-06-28 RX ORDER — SULFAMETHOXAZOLE AND TRIMETHOPRIM 800; 160 MG/1; MG/1
1 TABLET ORAL 2 TIMES DAILY
Qty: 20 TABLET | Refills: 0 | Status: SHIPPED | OUTPATIENT
Start: 2022-06-28 | End: 2022-07-08

## 2022-06-28 NOTE — PROGRESS NOTES
"Subjective:       Patient ID: Rick Coronado is a 36 y.o. male.    Vitals:  height is 5' 9.49" (1.765 m) and weight is 141.4 kg (311 lb 12.8 oz) (abnormal). His oral temperature is 98.3 °F (36.8 °C). His blood pressure is 136/90 (abnormal) and his pulse is 91. His respiration is 18 and oxygen saturation is 99%.     Chief Complaint: Wound Check (To right calf, yellow-clear exudate)    HPI   Patient states potential bite on right calf 4-5 days ago, has had some draining but that has stopped.  Mild pain.  No fever.  Slowly improving.  Concerned that he may need antibiotics.  ROS    Constitutional: negative except as stated in HPI  Eye: negative except as stated in HPI  ENT: negative except as stated in HPI  Respiratory: negative except as stated in HPI  Cardiovascular: negative except as stated in HPI  Gastrointestinal: negative except as stated in HPI  Genitourinary: negative except as stated in HPI  Objective:      Physical Exam    VITAL SIGNS:  Reviewed.      GENERAL:  In no apparent distress  HEAD:  No signs of head trauma  EYES:  Pupils are equal.  Extraocular motions intact  MUSCULOSKELETAL: Normal range of motion  SKIN:  Right lateral lower leg 2 cm area of induration, no fluctuance, surrounding erythema.  Serosanguineous drainage.  No purulence.  Minimal tenderness.      Assessment:       1. Furuncle          Plan:         Furuncle    Other orders  -     sulfamethoxazole-trimethoprim 800-160mg (BACTRIM DS) 800-160 mg Tab; Take 1 tablet by mouth 2 (two) times daily. for 10 days  Dispense: 20 tablet; Refill: 0    No indication for I and D at this point.  Will give a course of Bactrim.  Use warm compresses as discussed.  Monitor closely.  Please follow instructions on patient education material.  Return to urgent care in 1-2 days if symptoms are not improving, immediately if you develop any new or worsening symptoms.               "

## 2022-06-30 ENCOUNTER — HOSPITAL ENCOUNTER (EMERGENCY)
Facility: HOSPITAL | Age: 37
Discharge: HOME | End: 2022-06-30
Attending: FAMILY MEDICINE
Payer: MEDICAID

## 2022-06-30 VITALS
RESPIRATION RATE: 18 BRPM | DIASTOLIC BLOOD PRESSURE: 85 MMHG | SYSTOLIC BLOOD PRESSURE: 134 MMHG | OXYGEN SATURATION: 97 % | BODY MASS INDEX: 44.38 KG/M2 | WEIGHT: 310 LBS | TEMPERATURE: 99 F | HEIGHT: 70 IN | HEART RATE: 85 BPM

## 2022-06-30 DIAGNOSIS — S80.861D INSECT BITE OF RIGHT LOWER LEG, SUBSEQUENT ENCOUNTER: Primary | ICD-10-CM
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
## 2022-06-30 DIAGNOSIS — L02.415 ABSCESS OF RIGHT LOWER LEG: ICD-10-CM

## 2022-06-30 PROCEDURE — 99283 EMERGENCY DEPT VISIT LOW MDM: CPT

## 2022-06-30 RX ORDER — NAPROXEN 500 MG/1
500 TABLET ORAL 2 TIMES DAILY
Qty: 14 TABLET | Refills: 0 | Status: SHIPPED | OUTPATIENT
Start: 2022-06-30 | End: 2022-07-07

## 2022-06-30 NOTE — ED PROVIDER NOTES
Encounter Date: 6/30/2022       History     Chief Complaint   Patient presents with    Insect Bite     C/o right lower leg pain s/p possible insect bite 3 days ago. Area to right calf swollen and red, warm to touch and leaking. Hx DM II     36-year-old male with history of hypertension and diabetes presents with suspected insect bite to the right lateral calf for the past 3 days.  Patient was seen at an urgent care 2 days ago and prescribed Bactrim.  Patient started Bactrim yesterday and has taken 2 doses.  Patient believes the pain and swelling was made worse while at work yesterday.  He is requesting a prescription for pain medication and work excuse for 2 days.  No fever.  Clear drainage from the wound.  No other complaints.        Review of patient's allergies indicates:  No Known Allergies  Past Medical History:   Diagnosis Date    Diabetes mellitus     Hypertension      No past surgical history on file.  History reviewed. No pertinent family history.  Social History     Tobacco Use    Smoking status: Never Smoker    Smokeless tobacco: Never Used     Review of Systems   Constitutional: Negative.    HENT: Negative.    Eyes: Negative.    Respiratory: Negative.    Cardiovascular: Negative.    Gastrointestinal: Negative.    Endocrine: Negative.    Genitourinary: Negative.    Musculoskeletal: Negative.    Skin: Positive for wound.   Allergic/Immunologic: Negative.    Neurological: Negative.    Hematological: Negative.    Psychiatric/Behavioral: Negative.        Physical Exam     Initial Vitals [06/30/22 0744]   BP Pulse Resp Temp SpO2   134/85 85 18 98.6 °F (37 °C) 97 %      MAP       --         Physical Exam    Nursing note and vitals reviewed.  Constitutional: He appears well-developed and well-nourished.   HENT:   Head: Normocephalic and atraumatic.   Eyes: EOM are normal. Pupils are equal, round, and reactive to light.   Neck: Neck supple.   Normal range of motion.  Cardiovascular: Normal rate and regular  rhythm.   Pulmonary/Chest: Breath sounds normal.   Abdominal: Abdomen is soft. Bowel sounds are normal.   Musculoskeletal:         General: Normal range of motion.      Cervical back: Normal range of motion and neck supple.     Neurological: He is alert and oriented to person, place, and time. He has normal strength. GCS score is 15. GCS eye subscore is 4. GCS verbal subscore is 5. GCS motor subscore is 6.   Skin: Skin is warm. Capillary refill takes less than 2 seconds.   Right lateral calf-2 cm x 1 cm circular raised abscess with clear drainage.  No fluctuance.  No surrounding erythema or inflammation.  Mildly tender to palpation.  Nothing to drain.   Psychiatric: He has a normal mood and affect.         ED Course   Procedures  Labs Reviewed - No data to display       Imaging Results    None          Medications - No data to display  Medical Decision Making:   ED Management:  Patient is nontoxic-appearing in no acute distress.  Vital signs stable.  Physical exam consistent with a simple skin abscess.  Patient has only been on antibiotics 1 day.  Has not failed outpatient treatment.  Encouraged patient to continue his Bactrim.  Keep his leg elevated and take naproxen for pain.  Call follow-up with PCP as soon as possible for further evaluation.  Strict return to ER precautions given, patient voiced understanding.                      Clinical Impression:   Final diagnoses:  [S80.861D, W57.XXXD] Insect bite of right lower leg, subsequent encounter (Primary)  [L02.415] Abscess of right lower leg          ED Disposition Condition    Discharge Stable        ED Prescriptions     Medication Sig Dispense Start Date End Date Auth. Provider    naproxen (NAPROSYN) 500 MG tablet Take 1 tablet (500 mg total) by mouth 2 (two) times daily. for 7 days 14 tablet 6/30/2022 7/7/2022 Beltran Michael MD        Follow-up Information     Follow up With Specialties Details Why Contact Info    Your PCP  Today             Beltran Michael,  MD  06/30/22 0812

## 2022-06-30 NOTE — Clinical Note
"Rick Coronado (Jeremy) was seen and treated in our emergency department on 6/30/2022.  He may return to work on 07/03/2022.       If you have any questions or concerns, please don't hesitate to call.       RN    "

## 2022-06-30 NOTE — ED TRIAGE NOTES
c/o right lower leg pain s/p possible insect bite 3 days ago. Area to right calf swollen and red, warm to touch and leaking. Hx DM II

## 2022-08-04 ENCOUNTER — OFFICE VISIT (OUTPATIENT)
Dept: INTERNAL MEDICINE | Facility: CLINIC | Age: 37
End: 2022-08-04
Payer: MEDICAID

## 2022-08-04 VITALS
RESPIRATION RATE: 20 BRPM | DIASTOLIC BLOOD PRESSURE: 89 MMHG | WEIGHT: 312.63 LBS | SYSTOLIC BLOOD PRESSURE: 142 MMHG | TEMPERATURE: 98 F | HEIGHT: 69 IN | BODY MASS INDEX: 46.31 KG/M2

## 2022-08-04 DIAGNOSIS — Z79.4 TYPE 2 DIABETES MELLITUS WITH OTHER SPECIFIED COMPLICATION, WITH LONG-TERM CURRENT USE OF INSULIN: Primary | ICD-10-CM

## 2022-08-04 DIAGNOSIS — E11.69 TYPE 2 DIABETES MELLITUS WITH OTHER SPECIFIED COMPLICATION, WITH LONG-TERM CURRENT USE OF INSULIN: Primary | ICD-10-CM

## 2022-08-04 LAB — HBA1C MFR BLD: 12.2 %

## 2022-08-04 PROCEDURE — 83036 HEMOGLOBIN GLYCOSYLATED A1C: CPT | Mod: PBBFAC | Performed by: INTERNAL MEDICINE

## 2022-08-04 PROCEDURE — 99214 OFFICE O/P EST MOD 30 MIN: CPT | Mod: PBBFAC | Performed by: INTERNAL MEDICINE

## 2022-08-04 RX ORDER — SERTRALINE HYDROCHLORIDE 50 MG/1
50 TABLET, FILM COATED ORAL DAILY
Qty: 30 TABLET | Refills: 11 | OUTPATIENT
Start: 2022-08-04 | End: 2023-07-25

## 2022-08-04 RX ORDER — LOSARTAN POTASSIUM 25 MG/1
25 TABLET ORAL DAILY
Qty: 90 TABLET | Refills: 1 | Status: SHIPPED | OUTPATIENT
Start: 2022-08-04 | End: 2023-01-22 | Stop reason: SDUPTHER

## 2022-08-04 RX ORDER — FLASH GLUCOSE SENSOR
1 KIT MISCELLANEOUS CONTINUOUS
Qty: 1 KIT | Refills: 0 | Status: SHIPPED | OUTPATIENT
Start: 2022-08-04

## 2022-08-04 NOTE — PROGRESS NOTES
"  [facility]  [address] [facility] [address]      PATIENT NAME: Rick Coronado  : 1985  DATE: 22  MRN: [id number]      Billing Provider: Davey Reyes MD  Level of Service:   Patient PCP Information     Provider PCP Type    Primary Doctor No General          Reason for Visit / Chief Complaint: Follow-up       History of Present Illness / Problem Focused Workflow     Rick Coronado presents to the clinic with Follow-up     He does have a history of poorly controlled diabetes mellitus and hypertension. Today he is complaining of some stress in life and mild depressive symptoms. He does follow with an outside [facility]. He denies any HI/SI. States that his mother passed away 3 months ago from COVID and he is having a hard time adjusting.    POC hemoglobin A1c was noted to be 12.4. No hyperglycemic symptoms.    PMH: diabetes, htn, eczema, gerd, obesity  Allergies: nkda  Surgeries: none  Family: Mother passsed away from COVID, diabetes. father is healthy. Sister-"beat cancer"  Social: neg times 3. Works [employer].        Review of Systems     10-point ROS performed and negative except as above.      Medical / Social / Family History     Past Medical History:   Diagnosis Date    Diabetes mellitus     Hypertension        History reviewed. No pertinent surgical history.    Social History    reports that he has never smoked. He has never used smokeless tobacco. He reports that he does not drink alcohol and does not use drugs.    Family History  's family history is not on file.    Medications and Allergies     Medications  Outpatient Medications Marked as Taking for the 22 encounter (Office Visit) with Davey Reyes MD   Medication Sig Dispense Refill    BD INSULIN SYRINGE ULTRA-FINE 0.5 mL 30 gauge x 1/2" Syrg USE TO INJECT LANTUS AT BEDTIME DAILY      BD ELSA 2ND GEN PEN NEEDLE 32 gauge x " Ndle USE WITH " "LANTUS DAILY      famotidine (PEPCID) 20 MG tablet Take 20 mg by mouth 2 (two) times daily.      glipiZIDE (GLUCOTROL) 10 MG tablet Take 10 mg by mouth.      LANTUS SOLOSTAR U-100 INSULIN glargine 100 units/mL (3mL) SubQ pen Inject 35 Units into the skin nightly. ADMINISTER 35 UNITS UNDER THE SKIN EVERY DAY AT BEDTIME WITH THE EVENING MEAL. ROTATE INJECTION SITES      losartan (COZAAR) 25 MG tablet Take 12.5 mg by mouth once daily.      metFORMIN (GLUCOPHAGE) 1000 MG tablet Take 1,000 mg by mouth 2 (two) times daily.      pen needle,diabetic dual safty (BD AUTOSHIELD DUO PEN NEEDLE) 30 gauge x 3/16" Ndle Use for diabetes 90 each 0    rosuvastatin (CRESTOR) 10 MG tablet Take 10 mg by mouth nightly. Once at bedtime      tiZANidine (ZANAFLEX) 4 MG tablet Take 8 mg by mouth.      triamcinolone acetonide 0.025% (KENALOG) 0.025 % Oint Apply topically.      TRUE METRIX GLUCOSE TEST STRIP Strp USE TO TEST THREE TIMES DAILY 100 strip 5    TRUE METRIX GLUCOSE TEST STRIP Strp USE TO TEST THREE TIMES DAILY      TRUEPLUS LANCETS 33 gauge Misc USE TO TEST THREE TIMES DAILY 100 each 5    TRUEPLUS LANCETS 33 gauge Misc USE TO TEST THREE TIMES DAILY 100 each 5       Allergies  Review of patient's allergies indicates:  No Known Allergies    Physical Examination     Vitals:    08/04/22 1420   BP: (!) 142/89   Resp: 20   Temp: 98.2 °F (36.8 °C)     General: AAOx3, NAD, alert and cooperative  HEENT: PERRLA, EOMI, normal conjunctiva  Neck: no LAD, no JVD, supple, no masses or thyromegaly  CVS: S1/S2 nml, RRR, no murmurs  Resp: CTA B/L, no rhonchi, rales, or wheezing  GI: no TTP, not distended, BS+, obese  Skin: not jaundiced, warm, no rashes  Musculoskeletal: normal ROM, no joint tenderness, normal muscular development  Extremities: no peripheral edema, peripheral pulses intact  Neuro: CN II-XII grossly intact, strength and sensation symmetric and intact throughout, no focal neurological deficits        Results   Reviewed " recent labs.        Assessment and Plan (including Health Maintenance)     Plan:   Rick was seen today for follow-up.    Diagnoses and all orders for this visit:    Type 2 diabetes mellitus with other specified complication, with long-term current use of insulin  -     POCT HEMOGLOBIN A1C        1. Uncontrolled diabetes mellitus E11.65  POC A1c today 12.4  Compliant with meds  Refilled metformin 1000 mg BID.  Continue lantus 35 units at bedtime daily.  Will start victoza  Refilled crestor 10 mg at bedtime.  Prescribed [email or web address] CGM. Unable to check cbgs due to work.  Continue medications as prescribed.  Follow ADA diet.  Avoid soda, simple sweets, and limit rice/pasta/bread/starches and consume brown options when possible.   Maintain healthy weight with BMI goal <30.   Perform aerobic exercise for 150 minutes per week (or 5 days a week for 30 minutes each day).   Examine feet daily.   Obtain annual dilated eye exam.  Eye exam: seeing eye doctor.  Foot exam: done on 3/3/22\  Will refer to endocrine.    3. Morbid obesity with BMI of 45.0-49.9, adult E66.01  BMI 46. Goal BMI <30.  Aerobic exercise 150 minutes per week.  Avoid soda, simple sugars, sweets, excessive rice, pasta, potatoes or bread.   Choose brown options when available and portion control.  Limit fast foods and fried foods.   Choose complex carbs in moderation (ex: green, leafy vegetables, beans, oatmeal).  Eat plenty of fresh fruits and vegetables with lean meats daily.   Consider permanent healthy lifestyle changes.  Added Victoza (GLP-1) which can help with weight loss.    4. HTN (hypertension) I10  Will increase losartan to 25 mg qd.  Follow a low sodium (less than 2 grams of sodium per day), DASH diet.   Continue medications as prescribed.  Monitor blood pressure and report any consistent values greater than 140/90 and keep a log.  Maintain healthy weight with a BMI goal of <30.   Aerobic exercise for 150 minutes per week (or 5 days a week  for 30 minutes each day).    5. GERD (gastroesophageal reflux disease) K21.9  Continue PPI  Avoid spicy, acidic, fried food and alcohol.   Eat 2-3 hours before bed.  Avoid tight fitting clothes and chew food thoroughly.   Reduce caffeine intake, avoid soda.   Take meds as prescribed.     6. Depression  No SI/HI  Will start zoloft 50 mg.    Routine labs.  F/u in 3 months.        Health Maintenance Due   Topic Date Due    Hepatitis C Screening  Never done    COVID-19 Vaccine (1) Never done    Foot Exam  Never done    HIV Screening  Never done    TETANUS VACCINE  Never done    Diabetes Urine Screening  09/28/2021    Lipid Panel  09/28/2021    Hemoglobin A1c  10/16/2021    Eye Exam  10/29/2021         Health Maintenance Topics with due status: Not Due       Topic Last Completion Date    Pneumococcal Vaccines (Age 0-64) 03/03/2022    Influenza Vaccine Not Due       No follow-ups on file.     No future appointments.         Signature:  Davey Reyes MD  [facility]  [address] [facility] [address]    Date of encounter: 8/4/22

## 2022-08-05 ENCOUNTER — TELEPHONE (OUTPATIENT)
Dept: INTERNAL MEDICINE | Facility: CLINIC | Age: 37
End: 2022-08-05
Payer: MEDICAID

## 2022-08-05 ENCOUNTER — LAB VISIT (OUTPATIENT)
Dept: LAB | Facility: HOSPITAL | Age: 37
End: 2022-08-05
Attending: INTERNAL MEDICINE
Payer: MEDICAID

## 2022-08-05 DIAGNOSIS — E11.69 TYPE 2 DIABETES MELLITUS WITH OTHER SPECIFIED COMPLICATION, WITH LONG-TERM CURRENT USE OF INSULIN: ICD-10-CM

## 2022-08-05 DIAGNOSIS — Z79.4 TYPE 2 DIABETES MELLITUS WITH OTHER SPECIFIED COMPLICATION, WITH LONG-TERM CURRENT USE OF INSULIN: ICD-10-CM

## 2022-08-05 LAB
APPEARANCE UR: CLEAR
BACTERIA #/AREA URNS AUTO: ABNORMAL /HPF
BILIRUB UR QL STRIP.AUTO: NEGATIVE MG/DL
CHOLEST SERPL-MCNC: 184 MG/DL
CHOLEST/HDLC SERPL: 5 {RATIO} (ref 0–5)
COLOR UR AUTO: ABNORMAL
CREAT UR-MCNC: 146 MG/DL (ref 63–166)
EST. AVERAGE GLUCOSE BLD GHB EST-MCNC: 303.4 MG/DL
GLUCOSE UR QL STRIP.AUTO: ABNORMAL MG/DL
HBA1C MFR BLD: 12.2 %
HDLC SERPL-MCNC: 36 MG/DL (ref 35–60)
HYALINE CASTS #/AREA URNS LPF: ABNORMAL /LPF
KETONES UR QL STRIP.AUTO: NEGATIVE MG/DL
LDLC SERPL CALC-MCNC: 118 MG/DL (ref 50–140)
LEUKOCYTE ESTERASE UR QL STRIP.AUTO: NEGATIVE UNIT/L
NITRITE UR QL STRIP.AUTO: NEGATIVE
PH UR STRIP.AUTO: 5.5 [PH]
PROT UR QL STRIP.AUTO: ABNORMAL MG/DL
PROT UR STRIP-MCNC: 65.5 MG/DL
PROT/CREAT UR-RTO: 448.6 MG/GM CR
RBC #/AREA URNS AUTO: ABNORMAL /HPF
RBC UR QL AUTO: NEGATIVE UNIT/L
SP GR UR STRIP.AUTO: 1.03
SQUAMOUS #/AREA URNS LPF: ABNORMAL /HPF
TRIGL SERPL-MCNC: 148 MG/DL (ref 34–140)
TSH SERPL-ACNC: 1.47 UIU/ML (ref 0.35–4.94)
UROBILINOGEN UR STRIP-ACNC: NORMAL MG/DL
VLDLC SERPL CALC-MCNC: 30 MG/DL
WBC #/AREA URNS AUTO: ABNORMAL /HPF

## 2022-08-05 PROCEDURE — 81001 URINALYSIS AUTO W/SCOPE: CPT

## 2022-08-05 PROCEDURE — 83036 HEMOGLOBIN GLYCOSYLATED A1C: CPT

## 2022-08-05 PROCEDURE — 80061 LIPID PANEL: CPT

## 2022-08-05 PROCEDURE — 84443 ASSAY THYROID STIM HORMONE: CPT

## 2022-08-05 PROCEDURE — 36415 COLL VENOUS BLD VENIPUNCTURE: CPT

## 2022-10-17 ENCOUNTER — OFFICE VISIT (OUTPATIENT)
Dept: URGENT CARE | Facility: CLINIC | Age: 37
End: 2022-10-17
Payer: MEDICAID

## 2022-10-17 VITALS
WEIGHT: 306.5 LBS | HEART RATE: 79 BPM | SYSTOLIC BLOOD PRESSURE: 129 MMHG | HEIGHT: 69 IN | OXYGEN SATURATION: 98 % | DIASTOLIC BLOOD PRESSURE: 91 MMHG | RESPIRATION RATE: 18 BRPM | BODY MASS INDEX: 45.4 KG/M2 | TEMPERATURE: 98 F

## 2022-10-17 DIAGNOSIS — Z76.0 MEDICATION REFILL: Primary | ICD-10-CM

## 2022-10-17 PROCEDURE — 99213 PR OFFICE/OUTPT VISIT, EST, LEVL III, 20-29 MIN: ICD-10-PCS | Mod: S$PBB,,,

## 2022-10-17 PROCEDURE — 99213 OFFICE O/P EST LOW 20 MIN: CPT | Mod: S$PBB,,,

## 2022-10-17 PROCEDURE — 99215 OFFICE O/P EST HI 40 MIN: CPT | Mod: PBBFAC

## 2022-10-17 RX ORDER — INSULIN GLARGINE 100 [IU]/ML
35 INJECTION, SOLUTION SUBCUTANEOUS NIGHTLY
Qty: 10.5 ML | Refills: 0 | OUTPATIENT
Start: 2022-10-17 | End: 2022-11-17

## 2022-10-17 NOTE — PROGRESS NOTES
"Subjective:       Patient ID: Rick Coronado is a 37 y.o. male.    Vitals:  height is 5' 9.29" (1.76 m) and weight is 139 kg (306 lb 8 oz) (abnormal). His oral temperature is 98 °F (36.7 °C). His blood pressure is 129/91 (abnormal) and his pulse is 79. His respiration is 18 and oxygen saturation is 98%.     Chief Complaint: Medication Refill (Out of Lantus. Out x 3-4 weeks. )    Pt states needing a refill on his Lantus as his sugars have been running high. States has been out of Lantus for the last 3 weeks.  Pt states having an appt with doctor next month. No current symptoms.    Medication Refill      Constitution: Negative.   HENT: Negative.     Neck: neck negative.   Cardiovascular: Negative.    Eyes: Negative.    Respiratory: Negative.     Gastrointestinal: Negative.    Genitourinary: Negative.    Musculoskeletal: Negative.    Skin: Negative.    Neurological: Negative.      Objective:      Physical Exam   Constitutional: He is oriented to person, place, and time.   HENT:   Head: Normocephalic.   Nose: Nose normal.   Mouth/Throat: Uvula is midline, oropharynx is clear and moist and mucous membranes are normal. Mucous membranes are moist. Oropharynx is clear.   Eyes: Pupils are equal, round, and reactive to light.   Cardiovascular: Normal rate and normal pulses.   Pulmonary/Chest: Effort normal.   Abdominal: Normal appearance. Soft.   Musculoskeletal: Normal range of motion.         General: Normal range of motion.   Neurological: He is alert and oriented to person, place, and time.   Skin: Skin is warm and dry.       Assessment:       1. Medication refill            Plan:         Medication refill  -     LANTUS SOLOSTAR U-100 INSULIN glargine 100 units/mL SubQ pen; Inject 35 Units into the skin nightly. ADMINISTER 35 UNITS UNDER THE SKIN EVERY DAY AT BEDTIME WITH THE EVENING MEAL. ROTATE INJECTION SITES  Dispense: 10.5 mL; Refill: 0    Take  medication as prescribed and follow up with PCP next " month.    Please see provided patient education for guidance.    Go to the ER if you experience chest pain with SOB, high fevers 103+, excessive vomiting/diarrhea, or general distress.

## 2022-10-17 NOTE — LETTER
October 17, 2022      [facility]  [address] [facility] [address]  Phone: [phone]       Patient: Rick Coronado   YOB: 1985  Date of Visit: 10/17/2022    To Whom It May Concern:    Hoa Coronado  was at [facility] on 10/17/2022. The patient may return to work/school on 10/18/22. If you have any questions or concerns, or if I can be of further assistance, please do not hesitate to contact me.    Sincerely,    BRIAN Fierro NP

## 2022-11-17 ENCOUNTER — HOSPITAL ENCOUNTER (EMERGENCY)
Facility: HOSPITAL | Age: 37
Discharge: HOME | End: 2022-11-17
Attending: EMERGENCY MEDICINE
Payer: MEDICAID

## 2022-11-17 VITALS
SYSTOLIC BLOOD PRESSURE: 146 MMHG | WEIGHT: 308.19 LBS | BODY MASS INDEX: 45.65 KG/M2 | DIASTOLIC BLOOD PRESSURE: 86 MMHG | OXYGEN SATURATION: 100 % | TEMPERATURE: 98 F | RESPIRATION RATE: 18 BRPM | HEIGHT: 69 IN | HEART RATE: 88 BPM

## 2022-11-17 DIAGNOSIS — Z76.0 MEDICATION REFILL: Primary | ICD-10-CM

## 2022-11-17 PROCEDURE — 82962 GLUCOSE BLOOD TEST: CPT

## 2022-11-17 PROCEDURE — 99284 EMERGENCY DEPT VISIT MOD MDM: CPT

## 2022-11-17 RX ORDER — INSULIN GLARGINE 100 [IU]/ML
35 INJECTION, SOLUTION SUBCUTANEOUS DAILY
Qty: 3 ML | Refills: 4 | Status: SHIPPED | OUTPATIENT
Start: 2022-11-17 | End: 2022-12-05 | Stop reason: SDUPTHER

## 2022-11-17 RX ORDER — PEN NEEDLE, DIABETIC 29 G X1/2"
NEEDLE, DISPOSABLE MISCELLANEOUS
Qty: 90 EACH | Refills: 0 | Status: SHIPPED | OUTPATIENT
Start: 2022-11-17 | End: 2022-12-05 | Stop reason: SDUPTHER

## 2022-11-17 RX ORDER — PEN NEEDLE, DIABETIC 32GX 5/32"
NEEDLE, DISPOSABLE MISCELLANEOUS
Qty: 90 EACH | Refills: 0 | Status: SHIPPED | OUTPATIENT
Start: 2022-11-17 | End: 2022-12-08 | Stop reason: SDUPTHER

## 2022-11-18 LAB — POCT GLUCOSE: 249 MG/DL (ref 70–110)

## 2022-11-18 NOTE — ED PROVIDER NOTES
Encounter Date: 11/17/2022       History     Chief Complaint   Patient presents with    Medication Refill     Needs insulin refill, out x4 days.  in triage. No c/o     Patient presents to the ER with request for a refill of his insulin pen and supplies; pt has a pending appt with his PCP in December; pt has no complaints at this time    The history is provided by the patient.   Medication Refill  This is a chronic problem. The current episode started 2 days ago. The problem has not changed since onset.Pertinent negatives include no chest pain, no abdominal pain, no headaches and no shortness of breath. The symptoms are relieved by medications.   Review of patient's allergies indicates:  No Known Allergies  Past Medical History:   Diagnosis Date    Diabetes mellitus     Hypertension      No past surgical history on file.  Family History   Problem Relation Age of Onset    Diabetes Mother      Social History     Tobacco Use    Smoking status: Never    Smokeless tobacco: Never   Substance Use Topics    Alcohol use: Never    Drug use: Never     Review of Systems   Constitutional:  Negative for fever.   HENT:  Negative for sore throat.    Respiratory:  Negative for shortness of breath.    Cardiovascular:  Negative for chest pain.   Gastrointestinal:  Negative for abdominal pain and nausea.   Genitourinary:  Negative for dysuria.   Musculoskeletal:  Negative for back pain.   Skin:  Negative for rash.   Neurological:  Negative for weakness and headaches.   Hematological:  Does not bruise/bleed easily.   Psychiatric/Behavioral: Negative.       Physical Exam     Initial Vitals [11/17/22 1940]   BP Pulse Resp Temp SpO2   (!) 145/86 84 20 97.5 °F (36.4 °C) 100 %      MAP       --         Physical Exam    Vitals reviewed.  Constitutional: He appears well-developed.   HENT:   Head: Normocephalic and atraumatic.   Eyes: Conjunctivae and EOM are normal. Pupils are equal, round, and reactive to light.   Neck:   Normal range  "of motion.  Cardiovascular:  Normal rate, regular rhythm and normal heart sounds.           Pulmonary/Chest: Breath sounds normal. He exhibits no tenderness.   Abdominal: Abdomen is soft. Bowel sounds are normal. He exhibits no distension. There is no abdominal tenderness.   Musculoskeletal:         General: Normal range of motion.      Cervical back: Normal range of motion.     Neurological: He is alert and oriented to person, place, and time. He displays normal reflexes. No cranial nerve deficit or sensory deficit. GCS score is 15. GCS eye subscore is 4. GCS verbal subscore is 5. GCS motor subscore is 6.   Skin: Skin is warm. No pallor.   Psychiatric: He has a normal mood and affect. His behavior is normal. Judgment and thought content normal.       ED Course   Procedures  Labs Reviewed - No data to display       Imaging Results    None          Medications - No data to display                           Clinical Impression:   Final diagnoses:  [Z76.0] Medication refill (Primary)        ED Disposition Condition    Discharge Stable          ED Prescriptions       Medication Sig Dispense Start Date End Date Auth. Provider    BD INSULIN SYRINGE ULTRA-FINE 0.5 mL 30 gauge x 1/2" Syrg USE TO INJECT LANTUS AT BEDTIME DAILY 90 each 11/17/2022 -- KIYA Morrow    BD ELSA 2ND GEN PEN NEEDLE 32 gauge x 5/32" Ndle USE WITH LANTUS DAILY 90 each 11/17/2022 -- KIYA Morrow    insulin (LANTUS SOLOSTAR U-100 INSULIN) glargine 100 units/mL SubQ pen Inject 35 Units into the skin once daily. 3 mL 11/17/2022 12/17/2022 KIYA Morrow          Follow-up Information       Follow up With Specialties Details Why Contact Info    discharge followup    If your symptoms become WORSE or you DO NOT IMPROVE and you are unable to reach your health care provider, you should RETURN to the emergency department    Davey Reyes MD Internal Medicine   [address] [address]  [phone]      discharge " info    Discussed all pertinent ED information, results, diagnosis and treatment plan; All questions and concerns were addressed at this time. Patient voices understanding of information and instructions. Patient is comfortable with plan and discharge             KIYA Morrow  11/17/22 9093

## 2022-12-05 ENCOUNTER — HOSPITAL ENCOUNTER (EMERGENCY)
Facility: HOSPITAL | Age: 37
Discharge: HOME | End: 2022-12-05
Attending: EMERGENCY MEDICINE
Payer: MEDICAID

## 2022-12-05 VITALS
WEIGHT: 306 LBS | SYSTOLIC BLOOD PRESSURE: 140 MMHG | OXYGEN SATURATION: 100 % | RESPIRATION RATE: 18 BRPM | DIASTOLIC BLOOD PRESSURE: 93 MMHG | TEMPERATURE: 98 F | BODY MASS INDEX: 45.19 KG/M2 | HEART RATE: 90 BPM

## 2022-12-05 DIAGNOSIS — Z76.0 ENCOUNTER FOR MEDICATION REFILL: ICD-10-CM

## 2022-12-05 DIAGNOSIS — Z86.39 HISTORY OF DIABETES MELLITUS: ICD-10-CM

## 2022-12-05 DIAGNOSIS — U07.1 COVID: Primary | ICD-10-CM

## 2022-12-05 LAB
FLUAV AG UPPER RESP QL IA.RAPID: NOT DETECTED
FLUBV AG UPPER RESP QL IA.RAPID: NOT DETECTED
POCT GLUCOSE: 230 MG/DL (ref 70–110)
SARS-COV-2 RNA RESP QL NAA+PROBE: DETECTED

## 2022-12-05 PROCEDURE — 0240U COVID/FLU A&B PCR: CPT | Performed by: NURSE PRACTITIONER

## 2022-12-05 PROCEDURE — 82962 GLUCOSE BLOOD TEST: CPT

## 2022-12-05 PROCEDURE — 99284 EMERGENCY DEPT VISIT MOD MDM: CPT | Mod: 25

## 2022-12-05 RX ORDER — METFORMIN HYDROCHLORIDE 1000 MG/1
1000 TABLET ORAL 2 TIMES DAILY
Qty: 60 TABLET | Refills: 0 | Status: SHIPPED | OUTPATIENT
Start: 2022-12-05 | End: 2022-12-07 | Stop reason: SDUPTHER

## 2022-12-05 RX ORDER — CETIRIZINE HYDROCHLORIDE 10 MG/1
10 TABLET ORAL DAILY
Qty: 14 TABLET | Refills: 0 | OUTPATIENT
Start: 2022-12-05 | End: 2023-07-25

## 2022-12-05 RX ORDER — PEN NEEDLE, DIABETIC 29 G X1/2"
NEEDLE, DISPOSABLE MISCELLANEOUS
Qty: 90 EACH | Refills: 0 | Status: SHIPPED | OUTPATIENT
Start: 2022-12-05 | End: 2023-06-08 | Stop reason: SDUPTHER

## 2022-12-05 RX ORDER — INSULIN GLARGINE 100 [IU]/ML
35 INJECTION, SOLUTION SUBCUTANEOUS DAILY
Qty: 10.5 ML | Refills: 0 | Status: SHIPPED | OUTPATIENT
Start: 2022-12-05 | End: 2022-12-07 | Stop reason: SDUPTHER

## 2022-12-05 RX ORDER — FLUTICASONE PROPIONATE 50 MCG
1 SPRAY, SUSPENSION (ML) NASAL 2 TIMES DAILY PRN
Qty: 15 G | Refills: 0 | OUTPATIENT
Start: 2022-12-05 | End: 2023-07-25

## 2022-12-05 RX ORDER — BENZONATATE 100 MG/1
100 CAPSULE ORAL 3 TIMES DAILY PRN
Qty: 30 CAPSULE | Refills: 0 | Status: SHIPPED | OUTPATIENT
Start: 2022-12-05 | End: 2022-12-15

## 2022-12-05 NOTE — DISCHARGE INSTRUCTIONS
Increase fluid intake, use tylenol every 6-8 hours for temperature of 101 or greater.   Follow CDC Guidelines for current quarantine recommendations.  Follow up with your PCP in ext 5-7 days for evaluation.

## 2022-12-05 NOTE — ED PROVIDER NOTES
Encounter Date: 12/5/2022       History     Chief Complaint   Patient presents with    Generalized Body Aches     Flu like symptoms onset last night     Pt is a 37 y.o. male who presents to the [facility] ED complaining of cough, body aches which began last night. Denies chest pain, SOB, weakness, dizziness, or abdominal pain. Pt additionally requesting a refill of his medication for DM. Pt is scheduled for an appointment with his PCP on Thursday but ran out of medication 2 days ago. Denies thirst, polyuria, polydipsia, or fever.     Review of patient's allergies indicates:  No Known Allergies  Past Medical History:   Diagnosis Date    Diabetes mellitus     Hypertension      No past surgical history on file.  Family History   Problem Relation Age of Onset    Diabetes Mother      Social History     Tobacco Use    Smoking status: Never    Smokeless tobacco: Never   Substance Use Topics    Alcohol use: Never    Drug use: Never     Review of Systems   Constitutional:  Negative for chills, diaphoresis, fatigue and fever.   HENT:  Positive for rhinorrhea. Negative for facial swelling, postnasal drip, sinus pressure, sinus pain, sore throat and trouble swallowing.    Respiratory:  Positive for cough. Negative for chest tightness, shortness of breath and wheezing.    Cardiovascular:  Negative for chest pain, palpitations and leg swelling.   Gastrointestinal:  Negative for abdominal pain, diarrhea, nausea and vomiting.   Genitourinary:  Negative for dysuria, flank pain, hematuria and urgency.   Musculoskeletal:  Negative for arthralgias, back pain and myalgias.   Skin:  Negative for color change and rash.   Neurological:  Negative for dizziness, syncope, weakness and headaches.   Hematological:  Does not bruise/bleed easily.   All other systems reviewed and are negative.    Physical Exam     Initial Vitals [12/05/22 0814]   BP Pulse Resp Temp SpO2   (!) 140/93 90 18 98.2 °F (36.8 °C) 100 %      MAP       --         Physical  Exam    Nursing note and vitals reviewed.  Constitutional: Vital signs are normal. He appears well-developed and well-nourished.   HENT:   Head: Normocephalic.   Nose: Mucosal edema and rhinorrhea present.   Mouth/Throat: Oropharynx is clear and moist.   Eyes: Conjunctivae and EOM are normal. Pupils are equal, round, and reactive to light.   Neck: Neck supple.   Normal range of motion.  Cardiovascular:  Normal rate, regular rhythm, normal heart sounds and intact distal pulses.           Pulmonary/Chest: Effort normal and breath sounds normal. No respiratory distress. He has no wheezes. He has no rhonchi. He has no rales. He exhibits no tenderness.   Dry cough present.     Abdominal: Abdomen is soft and flat. Bowel sounds are normal. There is no abdominal tenderness. There is no rebound, no guarding, no tenderness at McBurney's point and negative López's sign.   Musculoskeletal:         General: Normal range of motion.      Cervical back: Normal range of motion and neck supple.     Neurological: He is alert and oriented to person, place, and time. He has normal strength.   Skin: Skin is warm and dry. Capillary refill takes less than 2 seconds.   Psychiatric: He has a normal mood and affect. His behavior is normal. Judgment and thought content normal.       ED Course   Procedures  Labs Reviewed   COVID/FLU A&B PCR - Abnormal; Notable for the following components:       Result Value    SARS-CoV-2 PCR Detected (*)     All other components within normal limits    Narrative:     The Xpert Xpress SARS-CoV-2/FLU/RSV plus is a rapid, multiplexed real-time PCR test intended for the simultaneous qualitative detection and differentiation of SARS-CoV-2, Influenza A, Influenza B, and respiratory syncytial virus (RSV) viral RNA in either nasopharyngeal swab or nasal swab specimens.         POCT GLUCOSE - Abnormal; Notable for the following components:    POCT Glucose 230 (*)     All other components within normal limits   POCT  "GLUCOSE, HAND-HELD DEVICE          Imaging Results    None          Medications - No data to display  Medical Decision Making:   Differential Diagnosis:   URI  Influenza  COVID  DM  Clinical Tests:   Lab Tests: Ordered and Reviewed  ED Management:  9:09 AM Reassessed patient at this time. Reports condition has improved. Discussed with patient all pertinent ED information and results. Discussed diagnosis and treatment plan with patient. Follow up instructions and return to ED instruction have been given. All questions and concerns were addressed at this time. Patient voices understanding of information and instructions. Patient is comfortable with plan and discharge. Patient is stable for discharge.                           Clinical Impression:   Final diagnoses:  [U07.1] COVID (Primary)  [Z76.0] Encounter for medication refill  [Z86.39] History of diabetes mellitus      ED Disposition Condition    Discharge Stable          ED Prescriptions       Medication Sig Dispense Start Date End Date Auth. Provider    BD INSULIN SYRINGE ULTRA-FINE 0.5 mL 30 gauge x 1/2" Syrg USE TO INJECT LANTUS AT BEDTIME DAILY 90 each 12/5/2022 -- Hai Anthony Jr., ANDREWP    liraglutide 0.6 mg/0.1 mL, 18 mg/3 mL, subq PNIJ (VICTOZA 2-GINO) 0.6 mg/0.1 mL (18 mg/3 mL) PnIj pen Inject 0.6 mg into the skin once daily. Inject 0.6 mg per day for the first week. Then inject 1.2 mg per day 3 mL 12/5/2022 1/4/2023 Hai Anthony Jr., PAGE    metFORMIN (GLUCOPHAGE) 1000 MG tablet Take 1 tablet (1,000 mg total) by mouth 2 (two) times daily. 60 tablet 12/5/2022 1/4/2023 Hai Anthony Jr., ANDREWP    insulin (LANTUS SOLOSTAR U-100 INSULIN) glargine 100 units/mL SubQ pen Inject 35 Units into the skin once daily. 10.5 mL 12/5/2022 1/4/2023 Hai Anthony Jr., ANDREWP    cetirizine (ZYRTEC) 10 MG tablet Take 1 tablet (10 mg total) by mouth once daily. for 14 days 14 tablet 12/5/2022 12/19/2022 Hai Anthony Jr., FNP    fluticasone propionate (FLONASE) 50 " mcg/actuation nasal spray 1 spray (50 mcg total) by Each Nostril route 2 (two) times daily as needed for Rhinitis. 15 g 12/5/2022 -- PAGE Quinn Jr.    benzonatate (TESSALON) 100 MG capsule Take 1 capsule (100 mg total) by mouth 3 (three) times daily as needed for Cough (Cough). 30 capsule 12/5/2022 12/15/2022 PAGE Quinn Jr.          Follow-up Information       Follow up With Specialties Details Why Contact Info    Davey Reyes MD Internal Medicine  Keep scheduled appointment as planned. [address] [facility] [address]  [phone]      [facility] Emergency Medicine In 3 days As needed, If symptoms worsen [address] [facility] [id number] [phone]             PAGE Quinn Jr.  12/05/22 0913

## 2022-12-05 NOTE — Clinical Note
"Rick "Francisco Coronado was seen and treated in our emergency department on 12/5/2022.     COVID-19 is present in our communities across the state. There is limited testing for COVID at this time, so not all patients can be tested. In this situation, your employee meets the following criteria:    Rick Coronado has met the criteria for COVID-19 testing and has a POSITIVE result. He can return to work once they are asymptomatic for 24 hours without the use of fever reducing medications AND at least five days from the first positive result. A mask is recommended for 5 days post quarantine.     If you have any questions or concerns, or if I can be of further assistance, please do not hesitate to contact me.    Sincerely,             Hai Montelongo MD"

## 2022-12-07 NOTE — PROGRESS NOTES
[facility]  [address] [facility] [address]      PATIENT NAME: Rick Coronado  : 1985  DATE: 22  MRN: [id number]      Billing Provider: Davey Reyes MD  Level of Service:   Patient PCP Information       Provider PCP Type    Davey Reyes MD General            Reason for Visit / Chief Complaint: No chief complaint on file.       History of Present Illness / Problem Focused Workflow     Rick Coronado presents to the clinic with No chief complaint on file.      verified.  Telemed visit due to patient having COVID.  My location: [facility]  Patient's location: Home  Consent obtained to provide services via telemed: yes    Telemed visit due to  being positive for COVID.  Requesting refills on pen needles.  Denies SOB, chest pain.   Review of Systems     10-point ROS performed and negative except as above.      Medical / Social / Family History     Past Medical History:   Diagnosis Date    Diabetes mellitus     Hypertension        No past surgical history on file.    Social History    reports that he has never smoked. He has never used smokeless tobacco. He reports that he does not drink alcohol and does not use drugs.    Family History  's family history includes Diabetes in his mother.    Medications and Allergies     Medications  No outpatient medications have been marked as taking for the 22 encounter (Appointment) with Davey Reyes MD.       Allergies  Review of patient's allergies indicates:  No Known Allergies    Physical Examination   There were no vitals filed for this visit.  General: AAOx3, NAD, alert and cooperative. Talking in complete sentences.  Resp: No cough present over the phone        Results   Reviewed recent labs.        Assessment and Plan (including Health Maintenance)     Plan:   There are no diagnoses linked to this encounter.    Will refill pen needles.    Precautions given regarding COVID.    F/U in 1 month.    Health Maintenance Due   Topic Date Due    Hepatitis C Screening  Never done    COVID-19 Vaccine (1) Never done    Foot Exam  Never done    Eye Exam  Never done    HIV Screening  Never done    TETANUS VACCINE  Never done    Diabetes Urine Screening  09/28/2021    Influenza Vaccine (1) Never done    Hemoglobin A1c  11/05/2022         Health Maintenance Topics with due status: Not Due       Topic Last Completion Date    Pneumococcal Vaccines (Age 0-64) 03/03/2022    Lipid Panel 08/05/2022       No follow-ups on file.     Future Appointments   Date Time Provider Department Center   12/8/2022  2:00 PM Davey Reyes MD [facility] IM RES [place] [facility]   5/3/2023  9:00 AM Nicole Espino NP [facility] ENDOCR [place] [facility]            Signature:  Davey Reyes MD  [facility]  [address] [facility] [address]    Date of encounter: 12/8/22

## 2022-12-08 ENCOUNTER — OFFICE VISIT (OUTPATIENT)
Dept: INTERNAL MEDICINE | Facility: CLINIC | Age: 37
End: 2022-12-08
Payer: MEDICAID

## 2022-12-08 DIAGNOSIS — U07.1 COVID: Primary | ICD-10-CM

## 2022-12-12 DIAGNOSIS — E11.69 TYPE 2 DIABETES MELLITUS WITH OTHER SPECIFIED COMPLICATION, WITH LONG-TERM CURRENT USE OF INSULIN: ICD-10-CM

## 2022-12-12 DIAGNOSIS — Z79.4 TYPE 2 DIABETES MELLITUS WITH OTHER SPECIFIED COMPLICATION, WITH LONG-TERM CURRENT USE OF INSULIN: ICD-10-CM

## 2022-12-12 RX ORDER — INSULIN GLARGINE 100 [IU]/ML
35 INJECTION, SOLUTION SUBCUTANEOUS DAILY
Qty: 10.5 ML | Refills: 0 | Status: SHIPPED | OUTPATIENT
Start: 2022-12-12 | End: 2023-01-22 | Stop reason: SDUPTHER

## 2023-01-09 ENCOUNTER — HOSPITAL ENCOUNTER (EMERGENCY)
Facility: HOSPITAL | Age: 38
Discharge: HOME | End: 2023-01-09
Attending: EMERGENCY MEDICINE
Payer: MEDICAID

## 2023-01-09 VITALS
DIASTOLIC BLOOD PRESSURE: 89 MMHG | HEART RATE: 79 BPM | RESPIRATION RATE: 18 BRPM | BODY MASS INDEX: 44.43 KG/M2 | HEIGHT: 69 IN | WEIGHT: 300 LBS | TEMPERATURE: 98 F | OXYGEN SATURATION: 98 % | SYSTOLIC BLOOD PRESSURE: 136 MMHG

## 2023-01-09 DIAGNOSIS — K14.8 MUCOCELE OF TONGUE: Primary | ICD-10-CM

## 2023-01-09 PROCEDURE — 99283 EMERGENCY DEPT VISIT LOW MDM: CPT

## 2023-01-09 RX ORDER — CHLORHEXIDINE GLUCONATE ORAL RINSE 1.2 MG/ML
15 SOLUTION DENTAL 2 TIMES DAILY
Qty: 150 ML | Refills: 0 | Status: SHIPPED | OUTPATIENT
Start: 2023-01-09 | End: 2023-01-14

## 2023-01-09 NOTE — Clinical Note
"Rick Coronado (Jeremy) was seen and treated in our emergency department on 1/9/2023.  He may return to work on 01/10/2022.       If you have any questions or concerns, please don't hesitate to call.       RN    "

## 2023-01-09 NOTE — ED PROVIDER NOTES
Encounter Date: 1/9/2023       History     Chief Complaint   Patient presents with    Mouth Lesions     Pt complaint of a sore to tongue area possibly caused by teeth rubbing on tongue for 3 days     The history is provided by the patient. No  was used.   Mouth Lesions   The current episode started several days ago. The problem has been unchanged. Nothing relieves the symptoms. Nothing aggravates the symptoms. Associated symptoms include mouth sores. Pertinent negatives include no fever, no nausea, no sore throat and no rash.   Denies trauma.  No other complaints.    Review of patient's allergies indicates:  No Known Allergies  Past Medical History:   Diagnosis Date    Diabetes mellitus     Hypertension      No past surgical history on file.  Family History   Problem Relation Age of Onset    Diabetes Mother      Social History     Tobacco Use    Smoking status: Never    Smokeless tobacco: Never   Substance Use Topics    Alcohol use: Never    Drug use: Never     Review of Systems   Constitutional:  Negative for fever.   HENT:  Positive for mouth sores. Negative for sore throat.    Respiratory:  Negative for shortness of breath.    Cardiovascular:  Negative for chest pain.   Gastrointestinal:  Negative for nausea.   Genitourinary:  Negative for dysuria.   Musculoskeletal:  Negative for back pain.   Skin:  Negative for rash.   Neurological:  Negative for weakness.   Hematological:  Does not bruise/bleed easily.     Physical Exam     Initial Vitals [01/09/23 0735]   BP Pulse Resp Temp SpO2   136/89 79 18 97.9 °F (36.6 °C) 98 %      MAP       --         Physical Exam    Nursing note and vitals reviewed.  Constitutional: He appears well-developed and well-nourished.   HENT:   Head: Normocephalic and atraumatic.   Right Ear: External ear normal.   Left Ear: External ear normal.   Nose: Nose normal.   Small mucocele to right ventral surface of the tongue   Eyes: Conjunctivae and EOM are normal. Pupils  are equal, round, and reactive to light.   Neck: Neck supple.   Normal range of motion.  Cardiovascular:  Normal rate, regular rhythm, normal heart sounds and intact distal pulses.           Pulmonary/Chest: Breath sounds normal.   Abdominal: Abdomen is soft. Bowel sounds are normal.   Musculoskeletal:         General: Normal range of motion.      Cervical back: Normal range of motion and neck supple.     Neurological: He is alert and oriented to person, place, and time. He has normal strength. GCS score is 15. GCS eye subscore is 4. GCS verbal subscore is 5. GCS motor subscore is 6.   Skin: Skin is warm and dry. Capillary refill takes less than 2 seconds.   Psychiatric: He has a normal mood and affect. His behavior is normal. Judgment and thought content normal.       ED Course   Procedures  Labs Reviewed - No data to display       Imaging Results    None          Medications - No data to display                           Clinical Impression:   Final diagnoses:  [K14.8] Mucocele of tongue (Primary)        ED Disposition Condition    Discharge Stable          ED Prescriptions       Medication Sig Dispense Start Date End Date Auth. Provider    chlorhexidine (PERIDEX) 0.12 % solution Use as directed 15 mLs in the mouth or throat 2 (two) times daily. for 5 days 150 mL 1/9/2023 1/14/2023 Art Olson MD          Follow-up Information       Follow up With Specialties Details Why Contact Info    Follow up with your primary MD in 3-5 days if not improved.  Return to ED for worsening symptoms.                 Art Olson MD  01/09/23 0802

## 2023-01-22 ENCOUNTER — HOSPITAL ENCOUNTER (EMERGENCY)
Facility: HOSPITAL | Age: 38
Discharge: HOME | End: 2023-01-22
Attending: FAMILY MEDICINE
Payer: MEDICAID

## 2023-01-22 VITALS
HEIGHT: 69 IN | RESPIRATION RATE: 20 BRPM | WEIGHT: 306.44 LBS | HEART RATE: 91 BPM | DIASTOLIC BLOOD PRESSURE: 110 MMHG | TEMPERATURE: 97 F | OXYGEN SATURATION: 97 % | SYSTOLIC BLOOD PRESSURE: 166 MMHG | BODY MASS INDEX: 45.39 KG/M2

## 2023-01-22 DIAGNOSIS — Z79.4 TYPE 2 DIABETES MELLITUS WITH OTHER SPECIFIED COMPLICATION, WITH LONG-TERM CURRENT USE OF INSULIN: ICD-10-CM

## 2023-01-22 DIAGNOSIS — Z76.0 ENCOUNTER FOR MEDICATION REFILL: ICD-10-CM

## 2023-01-22 DIAGNOSIS — E11.69 TYPE 2 DIABETES MELLITUS WITH OTHER SPECIFIED COMPLICATION, WITH LONG-TERM CURRENT USE OF INSULIN: ICD-10-CM

## 2023-01-22 DIAGNOSIS — Z86.39 HISTORY OF DIABETES MELLITUS, TYPE II: ICD-10-CM

## 2023-01-22 DIAGNOSIS — I10 HYPERTENSION, UNSPECIFIED TYPE: Primary | ICD-10-CM

## 2023-01-22 PROCEDURE — 99282 EMERGENCY DEPT VISIT SF MDM: CPT

## 2023-01-22 RX ORDER — INSULIN GLARGINE 100 [IU]/ML
35 INJECTION, SOLUTION SUBCUTANEOUS DAILY
Qty: 10.5 ML | Refills: 1 | Status: SHIPPED | OUTPATIENT
Start: 2023-01-22 | End: 2023-03-02 | Stop reason: SDUPTHER

## 2023-01-22 RX ORDER — LOSARTAN POTASSIUM 25 MG/1
25 TABLET ORAL DAILY
Qty: 90 TABLET | Refills: 0 | Status: SHIPPED | OUTPATIENT
Start: 2023-01-22 | End: 2023-03-02 | Stop reason: SDUPTHER

## 2023-01-22 RX ORDER — METFORMIN HYDROCHLORIDE 1000 MG/1
1000 TABLET ORAL 2 TIMES DAILY
Qty: 60 TABLET | Refills: 1 | Status: SHIPPED | OUTPATIENT
Start: 2023-01-22 | End: 2023-03-02 | Stop reason: SDUPTHER

## 2023-01-22 NOTE — ED PROVIDER NOTES
Encounter Date: 1/22/2023       History     Chief Complaint   Patient presents with    med refills     States needs med refills for diabetic meds. States pcp appt not until march. Also needs bp meds.      Pt is a 37 y.o. male who presents to the [facility] ED requesting a refill of his DM and HTN home medications. Reports recently running out and is not scheduled to see his PCP for a few months. Denies chest pain, SOB, weakness, dizziness, headache, blurry vision, thirst, polyuria, or polydipsia.     Review of patient's allergies indicates:  No Known Allergies  Past Medical History:   Diagnosis Date    Diabetes mellitus     Hypertension      History reviewed. No pertinent surgical history.  Family History   Problem Relation Age of Onset    Diabetes Mother      Social History     Tobacco Use    Smoking status: Never    Smokeless tobacco: Never   Substance Use Topics    Alcohol use: Never    Drug use: Never     Review of Systems   Constitutional:  Negative for chills, diaphoresis, fatigue and fever.   HENT:  Negative for facial swelling, postnasal drip, rhinorrhea, sinus pressure, sinus pain, sore throat and trouble swallowing.    Respiratory:  Negative for cough, chest tightness, shortness of breath and wheezing.    Cardiovascular:  Negative for chest pain, palpitations and leg swelling.   Gastrointestinal:  Negative for abdominal pain, diarrhea, nausea and vomiting.   Genitourinary:  Negative for dysuria, flank pain, hematuria and urgency.   Musculoskeletal:  Negative for arthralgias, back pain and myalgias.   Skin:  Negative for color change and rash.   Neurological:  Negative for dizziness, syncope, weakness and headaches.   Hematological:  Does not bruise/bleed easily.   All other systems reviewed and are negative.    Physical Exam     Initial Vitals [01/22/23 1451]   BP Pulse Resp Temp SpO2   (!) 166/110 91 20 96.8 °F (36 °C) 97 %      MAP       --         Physical Exam    Nursing note and vitals  reviewed.  Constitutional: Vital signs are normal. He appears well-developed and well-nourished.   HENT:   Head: Normocephalic.   Nose: Nose normal.   Mouth/Throat: Oropharynx is clear and moist.   Eyes: Conjunctivae and EOM are normal. Pupils are equal, round, and reactive to light.   Neck: Neck supple.   Normal range of motion.  Cardiovascular:  Normal rate, regular rhythm, normal heart sounds and intact distal pulses.           Pulmonary/Chest: Effort normal and breath sounds normal. No respiratory distress. He has no wheezes. He has no rhonchi. He has no rales. He exhibits no tenderness.   Abdominal: Abdomen is soft and flat. Bowel sounds are normal. There is no abdominal tenderness. There is no rebound, no guarding, no tenderness at McBurney's point and negative López's sign.   Musculoskeletal:         General: Normal range of motion.      Cervical back: Normal range of motion and neck supple.     Neurological: He is alert and oriented to person, place, and time. He has normal strength.   Skin: Skin is warm and dry. Capillary refill takes less than 2 seconds.   Psychiatric: He has a normal mood and affect. His behavior is normal. Judgment and thought content normal.       ED Course   Procedures  Labs Reviewed - No data to display       Imaging Results    None          Medications - No data to display  Medical Decision Making:   Differential Diagnosis:   Medication refill  HTN  DM    ED Management:  3:05 PM Reassessed patient at this time. Reports condition has improved. Discussed with patient all pertinent ED information and results. Discussed diagnosis and treatment plan with patient. Follow up instructions and return to ED instruction have been given. All questions and concerns were addressed at this time. Patient voices understanding of information and instructions. Patient is comfortable with plan and discharge. Patient is stable for discharge.                           Clinical Impression:   Final  diagnoses:  [I10] Hypertension, unspecified type (Primary)  [Z76.0] Encounter for medication refill  [Z86.39] History of diabetes mellitus, type II        ED Disposition Condition    Discharge Stable          ED Prescriptions       Medication Sig Dispense Start Date End Date Auth. Provider    insulin (LANTUS SOLOSTAR U-100 INSULIN) glargine 100 units/mL SubQ pen Inject 35 Units into the skin once daily. 10.5 mL 1/22/2023 2/21/2023 Hai Anthony Jr., ANDREWP    losartan (COZAAR) 25 MG tablet Take 1 tablet (25 mg total) by mouth once daily. 90 tablet 1/22/2023 4/22/2023 Hai Anthony Jr., ANDREWP    metFORMIN (GLUCOPHAGE) 1000 MG tablet Take 1 tablet (1,000 mg total) by mouth 2 (two) times daily. 60 tablet 1/22/2023 3/23/2023 Hai Anthony Jr., ANDREWP    liraglutide 0.6 mg/0.1 mL, 18 mg/3 mL, subq PNIJ (VICTOZA 2-GINO) 0.6 mg/0.1 mL (18 mg/3 mL) PnIj pen Inject 1.2 mg into the skin once daily. 3 mL 1/22/2023 2/21/2023 PAGE Quinn Jr.          Follow-up Information       Follow up With Specialties Details Why Contact Info    Davey Reyes MD Internal Medicine In 1 week  [address] [facility] [address]  [phone]      [facility] Emergency Medicine In 3 days As needed, If symptoms worsen [address] [facility] [address] [phone]             Hai Anthony Jr., PAGE  01/22/23 5500

## 2023-01-22 NOTE — DISCHARGE INSTRUCTIONS
Follow up with your primary care physician in 3-5 days for follow up evaluation.  Take medication as prescribed.

## 2023-02-10 ENCOUNTER — HOSPITAL ENCOUNTER (EMERGENCY)
Facility: HOSPITAL | Age: 38
Discharge: HOME | End: 2023-02-10
Attending: FAMILY MEDICINE
Payer: MEDICAID

## 2023-02-10 VITALS
SYSTOLIC BLOOD PRESSURE: 128 MMHG | HEART RATE: 74 BPM | TEMPERATURE: 98 F | OXYGEN SATURATION: 97 % | WEIGHT: 305.13 LBS | HEIGHT: 69 IN | RESPIRATION RATE: 20 BRPM | BODY MASS INDEX: 45.19 KG/M2 | DIASTOLIC BLOOD PRESSURE: 87 MMHG

## 2023-02-10 DIAGNOSIS — N30.00 ACUTE CYSTITIS WITHOUT HEMATURIA: ICD-10-CM

## 2023-02-10 DIAGNOSIS — R42 DIZZINESS: Primary | ICD-10-CM

## 2023-02-10 LAB
ANION GAP SERPL CALC-SCNC: 8 MEQ/L
APPEARANCE UR: CLEAR
BACTERIA #/AREA URNS AUTO: ABNORMAL /HPF
BASOPHILS # BLD AUTO: 0.05 X10(3)/MCL (ref 0–0.2)
BASOPHILS NFR BLD AUTO: 0.6 %
BILIRUB UR QL STRIP.AUTO: NEGATIVE MG/DL
BUN SERPL-MCNC: 10.2 MG/DL (ref 8.9–20.6)
CALCIUM SERPL-MCNC: 9.4 MG/DL (ref 8.4–10.2)
CHLORIDE SERPL-SCNC: 101 MMOL/L (ref 98–107)
CO2 SERPL-SCNC: 26 MMOL/L (ref 22–29)
COLOR UR AUTO: ABNORMAL
CREAT SERPL-MCNC: 1.06 MG/DL (ref 0.73–1.18)
CREAT/UREA NIT SERPL: 10
EOSINOPHIL # BLD AUTO: 0.13 X10(3)/MCL (ref 0–0.9)
EOSINOPHIL NFR BLD AUTO: 1.6 %
ERYTHROCYTE [DISTWIDTH] IN BLOOD BY AUTOMATED COUNT: 12.9 % (ref 11.5–17)
GFR SERPLBLD CREATININE-BSD FMLA CKD-EPI: >60 MLS/MIN/1.73/M2
GLUCOSE SERPL-MCNC: 321 MG/DL (ref 74–100)
GLUCOSE UR QL STRIP.AUTO: ABNORMAL MG/DL
HCT VFR BLD AUTO: 42.7 % (ref 42–52)
HGB BLD-MCNC: 14.2 GM/DL (ref 14–18)
HYALINE CASTS #/AREA URNS LPF: ABNORMAL /LPF
IMM GRANULOCYTES # BLD AUTO: 0.02 X10(3)/MCL (ref 0–0.04)
IMM GRANULOCYTES NFR BLD AUTO: 0.3 %
KETONES UR QL STRIP.AUTO: NEGATIVE MG/DL
LEUKOCYTE ESTERASE UR QL STRIP.AUTO: 500 UNIT/L
LYMPHOCYTES # BLD AUTO: 2.94 X10(3)/MCL (ref 0.6–4.6)
LYMPHOCYTES NFR BLD AUTO: 37 %
MCH RBC QN AUTO: 29.4 PG
MCHC RBC AUTO-ENTMCNC: 33.3 MG/DL (ref 33–36)
MCV RBC AUTO: 88.4 FL (ref 80–94)
MONOCYTES # BLD AUTO: 0.57 X10(3)/MCL (ref 0.1–1.3)
MONOCYTES NFR BLD AUTO: 7.2 %
MUCOUS THREADS URNS QL MICRO: ABNORMAL /LPF
NEUTROPHILS # BLD AUTO: 4.24 X10(3)/MCL (ref 2.1–9.2)
NEUTROPHILS NFR BLD AUTO: 53.3 %
NITRITE UR QL STRIP.AUTO: NEGATIVE
NRBC BLD AUTO-RTO: 0 %
PH UR STRIP.AUTO: 5.5 [PH]
PLATELET # BLD AUTO: 323 X10(3)/MCL (ref 130–400)
PMV BLD AUTO: 10.8 FL (ref 7.4–10.4)
POCT GLUCOSE: 306 MG/DL (ref 70–110)
POTASSIUM SERPL-SCNC: 4 MMOL/L (ref 3.5–5.1)
PROT UR QL STRIP.AUTO: ABNORMAL MG/DL
RBC # BLD AUTO: 4.83 X10(6)/MCL (ref 4.7–6.1)
RBC #/AREA URNS AUTO: ABNORMAL /HPF
RBC UR QL AUTO: ABNORMAL UNIT/L
SODIUM SERPL-SCNC: 135 MMOL/L (ref 136–145)
SP GR UR STRIP.AUTO: 1.04
SQUAMOUS #/AREA URNS LPF: ABNORMAL /HPF
TROPONIN I SERPL-MCNC: <0.01 NG/ML (ref 0–0.04)
UROBILINOGEN UR STRIP-ACNC: NORMAL MG/DL
WBC # SPEC AUTO: 8 X10(3)/MCL (ref 4.5–11.5)
WBC #/AREA URNS AUTO: >100 /HPF

## 2023-02-10 PROCEDURE — 81001 URINALYSIS AUTO W/SCOPE: CPT | Performed by: FAMILY MEDICINE

## 2023-02-10 PROCEDURE — 93005 ELECTROCARDIOGRAM TRACING: CPT

## 2023-02-10 PROCEDURE — 85025 COMPLETE CBC W/AUTO DIFF WBC: CPT | Performed by: FAMILY MEDICINE

## 2023-02-10 PROCEDURE — 80048 BASIC METABOLIC PNL TOTAL CA: CPT | Performed by: FAMILY MEDICINE

## 2023-02-10 PROCEDURE — 84484 ASSAY OF TROPONIN QUANT: CPT | Performed by: FAMILY MEDICINE

## 2023-02-10 PROCEDURE — 99285 EMERGENCY DEPT VISIT HI MDM: CPT | Mod: 25

## 2023-02-10 PROCEDURE — 87088 URINE BACTERIA CULTURE: CPT | Performed by: FAMILY MEDICINE

## 2023-02-10 RX ORDER — SULFAMETHOXAZOLE AND TRIMETHOPRIM 800; 160 MG/1; MG/1
1 TABLET ORAL 2 TIMES DAILY
Qty: 14 TABLET | Refills: 0 | Status: SHIPPED | OUTPATIENT
Start: 2023-02-10 | End: 2023-02-17

## 2023-02-10 RX ORDER — MECLIZINE HYDROCHLORIDE 25 MG/1
25 TABLET ORAL
Status: DISCONTINUED | OUTPATIENT
Start: 2023-02-10 | End: 2023-02-10 | Stop reason: HOSPADM

## 2023-02-10 NOTE — Clinical Note
"Rick Coronado (Jeremy) was seen and treated in our emergency department on 2/10/2023.  He may return to work on 02/11/2023.       If you have any questions or concerns, please don't hesitate to call.       RN    "

## 2023-02-10 NOTE — ED PROVIDER NOTES
"Encounter Date: 2/10/2023       History     Chief Complaint   Patient presents with    Dizziness    Weakness     C/o  having "hot flashes" at work this am with dizziness and weakness. States feeling better at present. Lindsey [address]     36 y/o M presents to the ED with complaint of dizziness and  having " hot flashes" at work. Pt has a hx of DM, HTN, Obesity   Onset- prior to arrival   Duration- improving   Context- pt reports he was at work when he started developing the symptoms. He reports they symptoms are currently improving   Associated symptoms-- dizziness, weakness, hot flashes . Denies blurry vision, N/V/D, polyuria, polydipsia, syncopal episodes,  near syncope, CP, sob, ha, fever, chills,   Modifying factors- nothing makes it better or worse.      The history is provided by the patient. No  was used.   Review of patient's allergies indicates:  No Known Allergies  Past Medical History:   Diagnosis Date    Diabetes mellitus     Hypertension      History reviewed. No pertinent surgical history.  Family History   Problem Relation Age of Onset    Diabetes Mother      Social History     Tobacco Use    Smoking status: Never    Smokeless tobacco: Never   Substance Use Topics    Alcohol use: Never    Drug use: Never     Review of Systems   Constitutional:  Negative for fever.   HENT:  Negative for congestion, drooling, sore throat and voice change.    Respiratory:  Negative for shortness of breath.    Cardiovascular:  Negative for chest pain, palpitations and leg swelling.   Gastrointestinal:  Negative for diarrhea, nausea and vomiting.   Genitourinary:  Negative for dysuria.   Musculoskeletal:  Negative for back pain.   Skin:  Negative for rash.   Neurological:  Positive for dizziness and weakness. Negative for seizures, light-headedness, numbness and headaches.   Hematological:  Does not bruise/bleed easily.     Physical Exam     Initial Vitals [02/10/23 0808]   BP Pulse Resp Temp SpO2   (!) 142/96 " 76 20 97.7 °F (36.5 °C) 99 %      MAP       --         Physical Exam    Nursing note and vitals reviewed.  Constitutional: He appears well-developed and well-nourished. No distress.   HENT:   Head: Normocephalic and atraumatic.   Eyes: Conjunctivae are normal.   Cardiovascular:  Normal heart sounds and intact distal pulses.           Pulmonary/Chest: Breath sounds normal. No respiratory distress. He has no wheezes.   Abdominal: Abdomen is soft. Bowel sounds are normal. There is no abdominal tenderness. There is no rebound and no guarding.   Musculoskeletal:         General: Normal range of motion.     Neurological: He is alert and oriented to person, place, and time. He has normal strength. No sensory deficit. Gait normal. GCS score is 15. GCS eye subscore is 4. GCS verbal subscore is 5. GCS motor subscore is 6.   Skin: Skin is warm and dry. Capillary refill takes less than 2 seconds.   Psychiatric: He has a normal mood and affect. His behavior is normal. Judgment and thought content normal.       ED Course   Procedures  Labs Reviewed   BASIC METABOLIC PANEL - Abnormal; Notable for the following components:       Result Value    Sodium Level 135 (*)     Glucose Level 321 (*)     All other components within normal limits   URINALYSIS, REFLEX TO URINE CULTURE - Abnormal; Notable for the following components:    Protein, UA Trace (*)     Glucose, UA 4+ (*)     Blood, UA Trace (*)     Leukocyte Esterase,  (*)     WBC, UA >100 (*)     Bacteria, UA Trace (*)     Squamous Epithelial Cells, UA Occ (*)     Mucous, UA Trace (*)     RBC, UA 11-20 (*)     All other components within normal limits   CBC WITH DIFFERENTIAL - Abnormal; Notable for the following components:    MPV 10.8 (*)     All other components within normal limits   POCT GLUCOSE - Abnormal; Notable for the following components:    POCT Glucose 306 (*)     All other components within normal limits   TROPONIN I - Normal   CULTURE, URINE   CBC W/ AUTO  DIFFERENTIAL    Narrative:     The following orders were created for panel order CBC auto differential.  Procedure                               Abnormality         Status                     ---------                               -----------         ------                     CBC with Differential[[id number]]        Abnormal            Final result                 Please view results for these tests on the individual orders.     EKG Readings: (Independently Interpreted)   Initial Reading: No STEMI. Rhythm: Normal Sinus Rhythm. Heart Rate: 73. Ectopy: No Ectopy. ST Segments: Normal ST Segments. T Waves: Normal.   ECG Results              EKG 12-lead (In process)  Result time 02/10/23 09:22:17      In process by Interface, Lab In [facility] (02/10/23 09:22:17)                   Narrative:    Test Reason : R42,    Vent. Rate : 073 BPM     Atrial Rate : 073 BPM     P-R Int : 176 ms          QRS Dur : 086 ms      QT Int : 364 ms       P-R-T Axes : 015 051 007 degrees     QTc Int : 401 ms    Normal sinus rhythm  Normal ECG  No previous ECGs available    Referred By: [email or web address]   SELF           Confirmed By:                                   Imaging Results              X-Ray Chest PA And Lateral (Final result)  Result time 02/10/23 09:13:15      Final result by Hai Alonso MD (02/10/23 09:13:15)                   Impression:      No acute cardiopulmonary abnormality.      Electronically signed by: Hai Alonso  Date:    02/10/2023  Time:    09:13               Narrative:    EXAMINATION:  XR CHEST PA AND LATERAL    CLINICAL HISTORY:  Dizziness and giddiness    COMPARISON:  7 February 2020    FINDINGS:  PA and lateral views of the chest were obtained. Heart and mediastinum within normal limits. The lungs are clear. No pneumothorax or significant effusion.                                       Medications   sodium chloride 0.9% bolus 1,000 mL 1,000 mL (has no administration in time range)   sodium chloride 0.9% bolus 1,000  mL 1,000 mL (has no administration in time range)   meclizine tablet 25 mg (has no administration in time range)     Medical Decision Making:   Clinical Tests:   Lab Tests: Reviewed       <> Summary of Lab: CBG elevated, UA shows UTI,   Radiological Study: Reviewed  Medical Tests: Reviewed  ED Management:  The patient is resting comfortably and is in no acute distress.  Rick Coronado states that symptoms have improved since being in the ER. Discussed IV fluids however pt declines today. Discussed test results, shared treatment plan, specific conditions for return, and importance of follow up with patient and family. Advised  Rick Coronado  to complete  treatment with Rx  as well. The patient understands and agrees with the plan as discussed. Answered all patient questions at this time.  Rick Coronado has remained hemodynamically stable throughout the ED course and is appropriate for discharge home.    Of note, I ordered fluids and meds to pt however pt was not put in a room. Pt does not want to wait in ED to get a bed and does not want to wait any further as he reports he is feeling better.                            Clinical Impression:   Final diagnoses:  [R42] Dizziness (Primary)  [N30.00] Acute cystitis without hematuria        ED Disposition Condition    Discharge Stable          ED Prescriptions       Medication Sig Dispense Start Date End Date Auth. Provider    sulfamethoxazole-trimethoprim 800-160mg (BACTRIM DS) 800-160 mg Tab Take 1 tablet by mouth 2 (two) times daily. for 7 days 14 tablet 2/10/2023 2/17/2023 Marco A Gonzalez MD          Follow-up Information       Follow up With Specialties Details Why Contact Info    Davey Reyes MD Internal Medicine In 2 days  [address] [facility] [id number] [phone]      [facility] Emergency Medicine  As needed, If symptoms worsen [address] [facility] [address] [phone]             Marco A  MD Lisa  02/10/23 5945

## 2023-02-12 LAB — BACTERIA UR CULT: NO GROWTH

## 2023-03-01 NOTE — PROGRESS NOTES
"  [facility]  [address] [address]      PATIENT NAME: Rick Coronado  : 1985  DATE: 3/2/23  MRN: [id number]      Billing Provider: Davey Reyes MD  Level of Service: AR OFFICE/OUTPT VISIT, EST, LEVL IV, 30-39 MIN  Patient PCP Information       Provider PCP Type    Davey Reyes MD General            Reason for Visit / Chief Complaint: Follow-up       History of Present Illness / Problem Focused Workflow     Rick Coronado presents to the clinic with Follow-up     Patient presents to IM clinic. No acute complaints. States that he has been out of his medications for about 10 days. He is unable to check his sugars. His A1c today was 12.7.    PMH: diabetes, htn, eczema, gerd, obesity  Allergies: nkda  Surgeries: none  Family: Mother passsed away from COVID, diabetes. father is healthy. Sister-"beat cancer"  Social: neg times 3. Works [employer].    Review of Systems     10-point ROS performed and negative except as above.      Medical / Social / Family History     Past Medical History:   Diagnosis Date    Diabetes mellitus     Hypertension        History reviewed. No pertinent surgical history.    Social History    reports that he has never smoked. He has never been exposed to tobacco smoke. He has never used smokeless tobacco. He reports that he does not drink alcohol and does not use drugs.    Family History  's family history includes Diabetes in his mother.    Medications and Allergies     Medications  Outpatient Medications Marked as Taking for the 3/2/23 encounter (Office Visit) with Davey Reyes MD   Medication Sig Dispense Refill    BD INSULIN SYRINGE ULTRA-FINE 0.5 mL 30 gauge x 1/2" Syrg USE TO INJECT LANTUS AT BEDTIME DAILY 90 each 0    BD ELSA 2ND GEN PEN NEEDLE 32 gauge x 5/32" Ndle USE WITH LANTUS DAILY 100 each 3    flash glucose sensor (FREESTYLE SULY 14 DAY SENSOR) Kit 1 Units by " "Misc.(Non-Drug; Combo Route) route continuous. 1 kit 0    fluticasone propionate (FLONASE) 50 mcg/actuation nasal spray 1 spray (50 mcg total) by Each Nostril route 2 (two) times daily as needed for Rhinitis. (Patient taking differently: 1 spray by Each Nostril route 2 (two) times daily as needed for Rhinitis. As needed) 15 g 0    pen needle,diabetic dual safty (BD AUTOSHIELD DUO PEN NEEDLE) 30 gauge x 3/16" Ndle Use for diabetes 90 each 0    triamcinolone acetonide 0.025% (KENALOG) 0.025 % Oint Apply topically.      TRUE METRIX GLUCOSE TEST STRIP Strp USE TO TEST THREE TIMES DAILY 100 strip 5    TRUE METRIX GLUCOSE TEST STRIP Strp USE TO TEST THREE TIMES DAILY      TRUEPLUS LANCETS 33 gauge Misc USE TO TEST THREE TIMES DAILY 100 each 5    TRUEPLUS LANCETS 33 gauge Misc USE TO TEST THREE TIMES DAILY 100 each 5    [DISCONTINUED] losartan (COZAAR) 25 MG tablet Take 1 tablet (25 mg total) by mouth once daily. 90 tablet 0    [DISCONTINUED] metFORMIN (GLUCOPHAGE) 1000 MG tablet Take 1 tablet (1,000 mg total) by mouth 2 (two) times daily. 60 tablet 1       Allergies  Review of patient's allergies indicates:  No Known Allergies    Physical Examination     Vitals:    03/02/23 1409   BP: (!) 140/83   Pulse: 89   Resp: 18   Temp: 98.4 °F (36.9 °C)     General: AAOx3, NAD, alert and cooperative  HEENT: PERRLA, EOMI, normal conjunctiva  Neck: no LAD, no JVD, supple, no masses or thyromegaly  CVS: S1/S2 nml, RRR, no murmurs, rubs or gallops  Resp: CTA B/L, no rhonchi, rales, or wheezing  GI: no TTP, not distended, BS+  Skin: not jaundiced, warm, no rashes  Musculoskeletal: normal ROM, no joint tenderness, normal muscular development  Extremities: no peripheral edema, peripheral pulses intact  Diabetic foot exam. Bilateral feet warm, pulses intact. Able to sense monofilament.  Tinea pedis noted.  Neuro: CN II-XII grossly intact, strength and sensation symmetric and intact throughout, no focal neurological deficits        Results "   Reviewed recent labs.        Assessment and Plan (including Health Maintenance)     Plan:   Rick was seen today for follow-up.    Diagnoses and all orders for this visit:    Tinea pedis, unspecified laterality  -     clotrimazole (LOTRIMIN) 1 % cream; Apply topically 2 (two) times daily.    Type 2 diabetes mellitus with other specified complication, with long-term current use of insulin  -     insulin (LANTUS SOLOSTAR U-100 INSULIN) glargine 100 units/mL SubQ pen; Inject 35 Units into the skin once daily.  -     liraglutide 0.6 mg/0.1 mL, 18 mg/3 mL, subq PNIJ (VICTOZA 2-GINO) 0.6 mg/0.1 mL (18 mg/3 mL) PnIj pen; Inject 1.2 mg into the skin once daily.  -     metFORMIN (GLUCOPHAGE) 1000 MG tablet; Take 1 tablet (1,000 mg total) by mouth 2 (two) times daily.  -     POCT HEMOGLOBIN A1C  -     Foot Exam Performed  -     Diabetic Eye Screening Photo; Future  -     Hemoglobin A1C; Future    Other orders  -     losartan (COZAAR) 25 MG tablet; Take 1 tablet (25 mg total) by mouth once daily.  -     insulin lispro 100 unit/mL injection; Inject 8 Units into the skin 3 (three) times daily before meals.        Uncontrolled diabetes mellitus E11.65  POC A1c today 12.7  Compliant with meds until he ran out about 10 days ago.  Refilled metformin 1000 mg BID.  Continue lantus 35 units at bedtime daily.  Continue Victoza. Will increase to 1.8 mg qd.  Will add lispro 8 units with meals.  Refilled crestor 10 mg at bedtime.  Follow ADA diet.  Avoid soda, simple sweets, and limit rice/pasta/bread/starches and consume brown options when possible.   Maintain healthy weight with BMI goal <30.   Perform aerobic exercise for 150 minutes per week (or 5 days a week for 30 minutes each day).   Examine feet daily.   Obtain annual dilated eye exam.  Eye exam: seeing eye doctor.  Foot exam: done on today  Has endocrine appt in May    3. Morbid obesity with BMI of 45.0-49.9, adult E66.01  BMI 44. Goal BMI <30.  Aerobic exercise 150 minutes per  week.  Avoid soda, simple sugars, sweets, excessive rice, pasta, potatoes or bread.   Choose brown options when available and portion control.  Limit fast foods and fried foods.   Choose complex carbs in moderation (ex: green, leafy vegetables, beans, oatmeal).  Eat vegetables with lean meats daily.   Consider permanent healthy lifestyle changes.  Continue victoza. Will increase to 1.8 mg subcut    4. HTN (hypertension) I10  BP elevated today. However did not take meds.  Continue losartan to 25 mg qd.  Follow a low sodium (less than 2 grams of sodium per day), DASH diet.   Continue medications as prescribed.  Monitor blood pressure and report any consistent values greater than 140/90 and keep a log.  Maintain healthy weight with a BMI goal of <30.   Aerobic exercise for 150 minutes per week (or 5 days a week for 30 minutes each day).    5. GERD (gastroesophageal reflux disease) K21.9  Continue PPI  Avoid spicy, acidic, fried food and alcohol.   Eat 2-3 hours before bed.  Avoid tight fitting clothes and chew food thoroughly.   Reduce caffeine intake, avoid soda.   Take meds as prescribed.     6. Depression  No SI/HI  Will start zoloft 50 mg.    7. Tinea Pedis  Will give clotrimazole cream bid    Routine labs.  F/u in 3 months.    Health Maintenance Due   Topic Date Due    Hepatitis C Screening  Never done    COVID-19 Vaccine (1) Never done    HIV Screening  Never done    Diabetes Urine Screening  09/28/2021    Hemoglobin A1c  11/05/2022    Pneumococcal Vaccines (Age 0-64) (2 - PCV) 03/03/2023         Health Maintenance Topics with due status: Not Due       Topic Last Completion Date    Lipid Panel 08/05/2022    Foot Exam 03/02/2023    Eye Exam 03/02/2023       Follow up in about 3 months (around 6/2/2023).     Future Appointments   Date Time Provider Department Center   5/3/2023  9:00 AM Nicole Espino NP [facility]            Signature:  Davey Reyes MD  [facility]  [address] [address]    Date of encounter: 3/2/23

## 2023-03-02 ENCOUNTER — OFFICE VISIT (OUTPATIENT)
Dept: INTERNAL MEDICINE | Facility: CLINIC | Age: 38
End: 2023-03-02
Payer: MEDICAID

## 2023-03-02 VITALS
HEIGHT: 69 IN | TEMPERATURE: 98 F | BODY MASS INDEX: 44.88 KG/M2 | WEIGHT: 303 LBS | HEART RATE: 89 BPM | RESPIRATION RATE: 18 BRPM | OXYGEN SATURATION: 99 % | DIASTOLIC BLOOD PRESSURE: 83 MMHG | SYSTOLIC BLOOD PRESSURE: 140 MMHG

## 2023-03-02 DIAGNOSIS — E11.69 TYPE 2 DIABETES MELLITUS WITH OTHER SPECIFIED COMPLICATION, WITH LONG-TERM CURRENT USE OF INSULIN: ICD-10-CM

## 2023-03-02 DIAGNOSIS — B35.3 TINEA PEDIS, UNSPECIFIED LATERALITY: Primary | ICD-10-CM

## 2023-03-02 DIAGNOSIS — Z79.4 TYPE 2 DIABETES MELLITUS WITH OTHER SPECIFIED COMPLICATION, WITH LONG-TERM CURRENT USE OF INSULIN: ICD-10-CM

## 2023-03-02 PROCEDURE — 99214 OFFICE O/P EST MOD 30 MIN: CPT | Mod: PBBFAC | Performed by: INTERNAL MEDICINE

## 2023-03-02 RX ORDER — INSULIN LISPRO 100 [IU]/ML
8 INJECTION, SOLUTION INTRAVENOUS; SUBCUTANEOUS
Qty: 3 ML | Refills: 5 | Status: SHIPPED | OUTPATIENT
Start: 2023-03-02 | End: 2023-06-08

## 2023-03-02 RX ORDER — METFORMIN HYDROCHLORIDE 1000 MG/1
1000 TABLET ORAL 2 TIMES DAILY
Qty: 60 TABLET | Refills: 1 | Status: SHIPPED | OUTPATIENT
Start: 2023-03-02 | End: 2023-09-28 | Stop reason: SDUPTHER

## 2023-03-02 RX ORDER — INSULIN GLARGINE 100 [IU]/ML
35 INJECTION, SOLUTION SUBCUTANEOUS DAILY
Qty: 10.5 ML | Refills: 6 | Status: SHIPPED | OUTPATIENT
Start: 2023-03-02 | End: 2023-06-08

## 2023-03-02 RX ORDER — LOSARTAN POTASSIUM 25 MG/1
25 TABLET ORAL DAILY
Qty: 90 TABLET | Refills: 1 | Status: SHIPPED | OUTPATIENT
Start: 2023-03-02 | End: 2023-09-28 | Stop reason: SDUPTHER

## 2023-03-02 RX ORDER — CLOTRIMAZOLE 1 %
CREAM (GRAM) TOPICAL 2 TIMES DAILY
Qty: 28 G | Refills: 0 | OUTPATIENT
Start: 2023-03-02 | End: 2023-07-25

## 2023-04-28 ENCOUNTER — OFFICE VISIT (OUTPATIENT)
Dept: URGENT CARE | Facility: CLINIC | Age: 38
End: 2023-04-28
Payer: MEDICAID

## 2023-04-28 VITALS
TEMPERATURE: 98 F | RESPIRATION RATE: 18 BRPM | DIASTOLIC BLOOD PRESSURE: 97 MMHG | WEIGHT: 308.31 LBS | HEART RATE: 79 BPM | HEIGHT: 69 IN | OXYGEN SATURATION: 98 % | BODY MASS INDEX: 45.67 KG/M2 | SYSTOLIC BLOOD PRESSURE: 134 MMHG

## 2023-04-28 DIAGNOSIS — R11.0 NAUSEA: Primary | ICD-10-CM

## 2023-04-28 DIAGNOSIS — Z11.52 ENCOUNTER FOR SCREENING FOR SEVERE ACUTE RESPIRATORY SYNDROME CORONAVIRUS 2 (SARS-COV-2) INFECTION: ICD-10-CM

## 2023-04-28 DIAGNOSIS — R11.10 VOMITING, UNSPECIFIED VOMITING TYPE, UNSPECIFIED WHETHER NAUSEA PRESENT: ICD-10-CM

## 2023-04-28 LAB
CTP QC/QA: YES
SARS-COV-2 RDRP RESP QL NAA+PROBE: NEGATIVE

## 2023-04-28 PROCEDURE — 99213 PR OFFICE/OUTPT VISIT, EST, LEVL III, 20-29 MIN: ICD-10-PCS | Mod: S$PBB,,,

## 2023-04-28 PROCEDURE — 99213 OFFICE O/P EST LOW 20 MIN: CPT | Mod: S$PBB,,,

## 2023-04-28 PROCEDURE — 99215 OFFICE O/P EST HI 40 MIN: CPT | Mod: PBBFAC

## 2023-04-28 PROCEDURE — 87635 SARS-COV-2 COVID-19 AMP PRB: CPT | Mod: PBBFAC

## 2023-04-28 RX ORDER — ONDANSETRON 4 MG/1
4 TABLET, ORALLY DISINTEGRATING ORAL EVERY 8 HOURS PRN
Qty: 10 TABLET | Refills: 0 | Status: SHIPPED | OUTPATIENT
Start: 2023-04-28 | End: 2023-05-02 | Stop reason: SDUPTHER

## 2023-04-28 NOTE — PROGRESS NOTES
"Subjective:      Patient ID: Rick Coronado is a 37 y.o. male.    Vitals:  height is 5' 9" (1.753 m) and weight is 139.8 kg (308 lb 4.8 oz) (abnormal). His oral temperature is 98.3 °F (36.8 °C). His blood pressure is 134/97 (abnormal) and his pulse is 79. His respiration is 18 and oxygen saturation is 98%.     Chief Complaint: Nausea (xThis morning. ) and Emesis (xThis morning. )    Pt states N/V this morning, denies diarrhea or sick contacts. Denies fever or abdominal pain.     Nausea  Associated symptoms include nausea and vomiting.   Emesis       Constitution: Negative.   HENT: Negative.     Neck: neck negative.   Cardiovascular: Negative.    Eyes: Negative.    Respiratory: Negative.     Gastrointestinal:  Positive for nausea and vomiting.   Genitourinary: Negative.    Musculoskeletal: Negative.    Skin: Negative.    Neurological: Negative.     Objective:     Physical Exam   Constitutional: He is oriented to person, place, and time. obesity  HENT:   Head: Normocephalic.   Nose: Nose normal.   Mouth/Throat: Uvula is midline, oropharynx is clear and moist and mucous membranes are normal. Mucous membranes are moist. Oropharynx is clear.   Eyes: Pupils are equal, round, and reactive to light.   Cardiovascular: Normal rate, regular rhythm, normal heart sounds and normal pulses.   Pulmonary/Chest: Effort normal and breath sounds normal.   Abdominal: Normal appearance. He exhibits no distension and no mass. Soft. There is no abdominal tenderness. There is no rebound and no guarding. No hernia.   Musculoskeletal: Normal range of motion.         General: Normal range of motion.   Neurological: He is alert and oriented to person, place, and time.   Skin: Skin is warm and dry.   Vitals reviewed.  Results for orders placed or performed in visit on 04/28/23   POCT COVID-19 Rapid Screening   Result Value Ref Range    POC Rapid COVID Negative Negative     Acceptable Yes        Assessment:     1. Nausea    2. " Encounter for screening for severe acute respiratory syndrome coronavirus 2 (SARS-CoV-2) infection    3. Vomiting, unspecified vomiting type, unspecified whether nausea present        Plan:       Nausea  -     POCT COVID-19 Rapid Screening  -     ondansetron (ZOFRAN-ODT) 4 MG TbDL; Take 1 tablet (4 mg total) by mouth every 8 (eight) hours as needed (nausea).  Dispense: 10 tablet; Refill: 0    Encounter for screening for severe acute respiratory syndrome coronavirus 2 (SARS-CoV-2) infection  -     POCT COVID-19 Rapid Screening    Vomiting, unspecified vomiting type, unspecified whether nausea present  -     POCT COVID-19 Rapid Screening      Plenty of fluids, rest, bland diet.     ER precautions given, patient verbalized understanding.     Please see provided patient education for guidance.    Follow up with PCP or return to clinic if symptoms worsen or do not improve.

## 2023-04-28 NOTE — LETTER
April 28, 2023      [facility]  [address][1] [address]  Phone: [phone]       Patient: Rick Coronado   YOB: 1985  Date of Visit: 04/28/2023    To Whom It May Concern:    Hoa Coronado  was at [facility] on 04/28/2023. The patient may return to work/school on 04/29/2023 with no restrictions. If you have any questions or concerns, or if I can be of further assistance, please do not hesitate to contact me.    Sincerely,    PAGE Bunch

## 2023-05-02 ENCOUNTER — HOSPITAL ENCOUNTER (EMERGENCY)
Facility: HOSPITAL | Age: 38
Discharge: HOME | End: 2023-05-02
Attending: STUDENT IN AN ORGANIZED HEALTH CARE EDUCATION/TRAINING PROGRAM
Payer: MEDICAID

## 2023-05-02 VITALS
TEMPERATURE: 97 F | SYSTOLIC BLOOD PRESSURE: 154 MMHG | OXYGEN SATURATION: 99 % | HEIGHT: 69 IN | DIASTOLIC BLOOD PRESSURE: 101 MMHG | RESPIRATION RATE: 18 BRPM | BODY MASS INDEX: 45.62 KG/M2 | WEIGHT: 308 LBS | HEART RATE: 73 BPM

## 2023-05-02 DIAGNOSIS — R19.7 NAUSEA VOMITING AND DIARRHEA: Primary | ICD-10-CM

## 2023-05-02 DIAGNOSIS — R11.2 NAUSEA VOMITING AND DIARRHEA: Primary | ICD-10-CM

## 2023-05-02 DIAGNOSIS — R11.0 NAUSEA: ICD-10-CM

## 2023-05-02 DIAGNOSIS — A08.4 VIRAL GASTROENTERITIS: ICD-10-CM

## 2023-05-02 LAB
ALBUMIN SERPL-MCNC: 3.6 G/DL (ref 3.5–5)
ALBUMIN/GLOB SERPL: 0.8 RATIO (ref 1.1–2)
ALP SERPL-CCNC: 57 UNIT/L (ref 40–150)
ALT SERPL-CCNC: 36 UNIT/L (ref 0–55)
APPEARANCE UR: CLEAR
AST SERPL-CCNC: 21 UNIT/L (ref 5–34)
BACTERIA #/AREA URNS AUTO: ABNORMAL /HPF
BASOPHILS # BLD AUTO: 0.02 X10(3)/MCL
BASOPHILS NFR BLD AUTO: 0.3 %
BILIRUB UR QL STRIP.AUTO: NEGATIVE MG/DL
BILIRUBIN DIRECT+TOT PNL SERPL-MCNC: 0.6 MG/DL
BUN SERPL-MCNC: 8.8 MG/DL (ref 8.9–20.6)
CALCIUM SERPL-MCNC: 9.2 MG/DL (ref 8.4–10.2)
CHLORIDE SERPL-SCNC: 103 MMOL/L (ref 98–107)
CO2 SERPL-SCNC: 23 MMOL/L (ref 22–29)
COLOR UR AUTO: YELLOW
CREAT SERPL-MCNC: 0.88 MG/DL (ref 0.73–1.18)
EOSINOPHIL # BLD AUTO: 0.14 X10(3)/MCL (ref 0–0.9)
EOSINOPHIL NFR BLD AUTO: 2 %
ERYTHROCYTE [DISTWIDTH] IN BLOOD BY AUTOMATED COUNT: 12.7 % (ref 11.5–17)
GFR SERPLBLD CREATININE-BSD FMLA CKD-EPI: >60 MLS/MIN/1.73/M2
GLOBULIN SER-MCNC: 4.7 GM/DL (ref 2.4–3.5)
GLUCOSE SERPL-MCNC: 248 MG/DL (ref 74–100)
GLUCOSE UR QL STRIP.AUTO: 100 MG/DL
HCT VFR BLD AUTO: 42.2 % (ref 42–52)
HGB BLD-MCNC: 13.7 G/DL (ref 14–18)
IMM GRANULOCYTES # BLD AUTO: 0.02 X10(3)/MCL (ref 0–0.04)
IMM GRANULOCYTES NFR BLD AUTO: 0.3 %
KETONES UR QL STRIP.AUTO: NEGATIVE MG/DL
LEUKOCYTE ESTERASE UR QL STRIP.AUTO: NEGATIVE UNIT/L
LIPASE SERPL-CCNC: 20 U/L
LYMPHOCYTES # BLD AUTO: 2.39 X10(3)/MCL (ref 0.6–4.6)
LYMPHOCYTES NFR BLD AUTO: 34.5 %
MCH RBC QN AUTO: 29 PG (ref 27–31)
MCHC RBC AUTO-ENTMCNC: 32.5 G/DL (ref 33–36)
MCV RBC AUTO: 89.4 FL (ref 80–94)
MONOCYTES # BLD AUTO: 0.56 X10(3)/MCL (ref 0.1–1.3)
MONOCYTES NFR BLD AUTO: 8.1 %
NEUTROPHILS # BLD AUTO: 3.79 X10(3)/MCL (ref 2.1–9.2)
NEUTROPHILS NFR BLD AUTO: 54.8 %
NITRITE UR QL STRIP.AUTO: NEGATIVE
NRBC BLD AUTO-RTO: 0 %
PH UR STRIP.AUTO: 5.5 [PH]
PLATELET # BLD AUTO: 311 X10(3)/MCL (ref 130–400)
PMV BLD AUTO: 10.1 FL (ref 7.4–10.4)
POTASSIUM SERPL-SCNC: 4 MMOL/L (ref 3.5–5.1)
PROT SERPL-MCNC: 8.3 GM/DL (ref 6.4–8.3)
PROT UR QL STRIP.AUTO: 30 MG/DL
RBC # BLD AUTO: 4.72 X10(6)/MCL (ref 4.7–6.1)
RBC #/AREA URNS AUTO: ABNORMAL /HPF
RBC UR QL AUTO: ABNORMAL UNIT/L
SODIUM SERPL-SCNC: 136 MMOL/L (ref 136–145)
SP GR UR STRIP.AUTO: >=1.03
SQUAMOUS #/AREA URNS AUTO: ABNORMAL /HPF
UROBILINOGEN UR STRIP-ACNC: 1 MG/DL
WBC # SPEC AUTO: 6.92 X10(3)/MCL (ref 4.5–11.5)
WBC #/AREA URNS AUTO: ABNORMAL /HPF

## 2023-05-02 PROCEDURE — 83690 ASSAY OF LIPASE: CPT | Performed by: STUDENT IN AN ORGANIZED HEALTH CARE EDUCATION/TRAINING PROGRAM

## 2023-05-02 PROCEDURE — 80053 COMPREHEN METABOLIC PANEL: CPT | Performed by: STUDENT IN AN ORGANIZED HEALTH CARE EDUCATION/TRAINING PROGRAM

## 2023-05-02 PROCEDURE — 96360 HYDRATION IV INFUSION INIT: CPT

## 2023-05-02 PROCEDURE — 99284 EMERGENCY DEPT VISIT MOD MDM: CPT | Mod: 25

## 2023-05-02 PROCEDURE — 63600175 PHARM REV CODE 636 W HCPCS: Performed by: STUDENT IN AN ORGANIZED HEALTH CARE EDUCATION/TRAINING PROGRAM

## 2023-05-02 PROCEDURE — 81001 URINALYSIS AUTO W/SCOPE: CPT | Performed by: STUDENT IN AN ORGANIZED HEALTH CARE EDUCATION/TRAINING PROGRAM

## 2023-05-02 PROCEDURE — 96361 HYDRATE IV INFUSION ADD-ON: CPT

## 2023-05-02 PROCEDURE — 85025 COMPLETE CBC W/AUTO DIFF WBC: CPT | Performed by: STUDENT IN AN ORGANIZED HEALTH CARE EDUCATION/TRAINING PROGRAM

## 2023-05-02 RX ORDER — ONDANSETRON 4 MG/1
8 TABLET, ORALLY DISINTEGRATING ORAL EVERY 8 HOURS PRN
Qty: 20 TABLET | Refills: 0 | Status: SHIPPED | OUTPATIENT
Start: 2023-05-02 | End: 2023-06-08 | Stop reason: SDUPTHER

## 2023-05-02 RX ADMIN — SODIUM CHLORIDE, POTASSIUM CHLORIDE, SODIUM LACTATE AND CALCIUM CHLORIDE 1000 ML: 600; 310; 30; 20 INJECTION, SOLUTION INTRAVENOUS at 07:05

## 2023-05-02 NOTE — ED TRIAGE NOTES
Pt c/o nausea, vomiting and diarrhea onset two days ago. Denies pain currently. States began zofran two days ago.

## 2023-05-02 NOTE — ED PROVIDER NOTES
Encounter Date: 5/2/2023       History     Chief Complaint   Patient presents with    Vomiting     Pt c/o nausea, vomiting and diarrhea onset two days ago. Denies pain currently. States began zofran two days ago.     HPI    37-year-old male with a past history of hypertension diabetes relatively noncompliant presents emergency department for diarrhea, nausea and vomiting for last 2 days.  Patient states it started with urgent Care was diagnosed with gastroenteritis.  He was given some Zofran states he when out.  Patient states he is continued to have diarrhea and even had an accident on herself at work.  Patient presents emergency department today because of continued symptoms.  States that he ran out of Zofran and much more.  Denies any abdominal pain.  No fevers.  States he is not been eating or taking his medication because of his symptoms.    Review of patient's allergies indicates:  No Known Allergies  Past Medical History:   Diagnosis Date    Diabetes mellitus     Hypertension      No past surgical history on file.  Family History   Problem Relation Age of Onset    Diabetes Mother      Social History     Tobacco Use    Smoking status: Never     Passive exposure: Never    Smokeless tobacco: Never   Substance Use Topics    Alcohol use: Never    Drug use: Never     Review of Systems   Constitutional:  Negative for fever.   Respiratory:  Negative for cough.    Cardiovascular:  Negative for chest pain.   Gastrointestinal:  Positive for diarrhea, nausea and vomiting. Negative for abdominal pain and constipation.   All other systems reviewed and are negative.    Physical Exam     Initial Vitals [05/02/23 0724]   BP Pulse Resp Temp SpO2   (!) 134/102 73 20 97.3 °F (36.3 °C) 99 %      MAP       --         Physical Exam    Nursing note and vitals reviewed.  Constitutional: He appears well-developed and well-nourished. He is Obese . No distress.   Cardiovascular:  Normal rate and regular rhythm.            Pulmonary/Chest: Breath sounds normal. No respiratory distress.   Abdominal: Abdomen is soft. There is no abdominal tenderness. There is no rebound and no guarding.   Musculoskeletal:         General: No tenderness. Normal range of motion.     Neurological: He is alert.   Skin: Skin is warm.   Psychiatric: He has a normal mood and affect. Thought content normal.       ED Course   Procedures  Labs Reviewed   COMPREHENSIVE METABOLIC PANEL - Abnormal; Notable for the following components:       Result Value    Glucose Level 248 (*)     Blood Urea Nitrogen 8.8 (*)     Globulin 4.7 (*)     Albumin/Globulin Ratio 0.8 (*)     All other components within normal limits   URINALYSIS, REFLEX TO URINE CULTURE - Abnormal; Notable for the following components:    Protein, UA 30 (*)     Glucose,  (*)     Blood, UA Small (*)     All other components within normal limits   CBC WITH DIFFERENTIAL - Abnormal; Notable for the following components:    Hgb 13.7 (*)     MCHC 32.5 (*)     All other components within normal limits   URINALYSIS, MICROSCOPIC - Abnormal; Notable for the following components:    Squamous Epithelial Cells, UA Moderate (*)     All other components within normal limits   LIPASE - Normal   CBC W/ AUTO DIFFERENTIAL    Narrative:     The following orders were created for panel order CBC auto differential.  Procedure                               Abnormality         Status                     ---------                               -----------         ------                     CBC with Differential[[id number]]        Abnormal            Final result                 Please view results for these tests on the individual orders.          Imaging Results    None          Medications   lactated ringers bolus 1,000 mL (1,000 mLs Intravenous New Bag 5/2/23 2727)     Medical Decision Making:   Differential Diagnosis:   Gastroenteritis, nausea, vomiting, diarrhea, infectious diarrhea, viral syndrome, dehydration    Medical Decision Making  Problems Addressed:  Nausea vomiting and diarrhea: acute illness or injury  Viral gastroenteritis: acute illness or injury    Amount and/or Complexity of Data Reviewed  External Data Reviewed: notes.  Labs: ordered. Decision-making details documented in ED Course.  Radiology: ordered and independent interpretation performed. Decision-making details documented in ED Course.    Risk  OTC drugs.  Prescription drug management.              ED Course as of 05/02/23 0906   Tue May 02, 2023   0757 WBC: 6.92 [BS]   0757 Hemoglobin(!): 13.7 [BS]   0757 Hematocrit: 42.2 [BS]   0757 Platelets: 311 [BS]   0757 NITRITE UA: Negative [BS]   0757 Leukocytes, UA: Negative [BS]   0811 Sodium: 136 [BS]   0811 Potassium: 4.0 [BS]   0811 Chloride: 103 [BS]   0811 CO2: 23 [BS]   0811 Glucose(!): 248 [BS]   0811 BUN(!): 8.8 [BS]   0811 Creatinine: 0.88 [BS]   0811 WBC: 6.92 [BS]   0811 Leukocytes, UA: Negative [BS]   0811 NITRITE UA: Negative [BS]   0904 Lipase: 20 [BS]   0904 Patient resting comfortably in bed in no acute distress.  Vital signs stable.  Labs all within normal limits.  No vomiting in the emergency department.  No diarrhea emergency department.  Will discharge with some more Zofran.  ER precautions given. [BS]      ED Course User Index  [BS] Elver Robledo MD                 Clinical Impression:   Final diagnoses:  [R11.2, R19.7] Nausea vomiting and diarrhea (Primary)  [A08.4] Viral gastroenteritis        ED Disposition Condition    Discharge Stable          ED Prescriptions       Medication Sig Dispense Start Date End Date Auth. Provider    ondansetron (ZOFRAN-ODT) 4 MG TbDL Take 2 tablets (8 mg total) by mouth every 8 (eight) hours as needed (nausea). 20 tablet 5/2/2023 -- Elver Robledo MD          Follow-up Information       Follow up With Specialties Details Why Contact Info    Davey Reyes MD Internal Medicine Schedule an appointment as soon as possible for a visit   [facility]  [facility] [address]  [phone]      [facility] - Emergency Dept Emergency Medicine Go to  If symptoms worsen [id number]  [facility] [address] [phone]             Elver Robledo MD  05/02/23 0906

## 2023-05-02 NOTE — Clinical Note
"Rick Arnoldemy" Ivonne was seen and treated in our emergency department on 5/2/2023.  He may return to work on 05/04/2023.       If you have any questions or concerns, please don't hesitate to call.      Elvre Robledo MD"

## 2023-06-08 ENCOUNTER — OFFICE VISIT (OUTPATIENT)
Dept: INTERNAL MEDICINE | Facility: CLINIC | Age: 38
End: 2023-06-08
Payer: MEDICAID

## 2023-06-08 VITALS
BODY MASS INDEX: 45.08 KG/M2 | HEART RATE: 80 BPM | TEMPERATURE: 98 F | HEIGHT: 69 IN | WEIGHT: 304.38 LBS | DIASTOLIC BLOOD PRESSURE: 86 MMHG | RESPIRATION RATE: 18 BRPM | SYSTOLIC BLOOD PRESSURE: 129 MMHG

## 2023-06-08 DIAGNOSIS — Z79.4 TYPE 2 DIABETES MELLITUS WITH OTHER SPECIFIED COMPLICATION, WITH LONG-TERM CURRENT USE OF INSULIN: ICD-10-CM

## 2023-06-08 DIAGNOSIS — Z00.00 WELLNESS EXAMINATION: ICD-10-CM

## 2023-06-08 DIAGNOSIS — E11.69 TYPE 2 DIABETES MELLITUS WITH OTHER SPECIFIED COMPLICATION, WITH LONG-TERM CURRENT USE OF INSULIN: ICD-10-CM

## 2023-06-08 DIAGNOSIS — R11.0 NAUSEA: ICD-10-CM

## 2023-06-08 DIAGNOSIS — Z23 NEED FOR VACCINATION AGAINST STREPTOCOCCUS PNEUMONIAE: ICD-10-CM

## 2023-06-08 DIAGNOSIS — E08.00 DIABETES MELLITUS DUE TO UNDERLYING CONDITION WITH HYPEROSMOLARITY WITHOUT COMA, WITHOUT LONG-TERM CURRENT USE OF INSULIN: Primary | ICD-10-CM

## 2023-06-08 LAB — HBA1C MFR BLD: 12.8 %

## 2023-06-08 PROCEDURE — 90677 PCV20 VACCINE IM: CPT | Mod: PBBFAC

## 2023-06-08 PROCEDURE — 99215 OFFICE O/P EST HI 40 MIN: CPT | Mod: PBBFAC | Performed by: INTERNAL MEDICINE

## 2023-06-08 PROCEDURE — 83036 HEMOGLOBIN GLYCOSYLATED A1C: CPT | Mod: PBBFAC | Performed by: INTERNAL MEDICINE

## 2023-06-08 RX ORDER — LIRAGLUTIDE 6 MG/ML
1.8 INJECTION SUBCUTANEOUS DAILY
Qty: 9 ML | Refills: 11 | Status: SHIPPED | OUTPATIENT
Start: 2023-06-08 | End: 2023-09-28

## 2023-06-08 RX ORDER — INSULIN PUMP SYRINGE, 3 ML
EACH MISCELLANEOUS
Qty: 1 EACH | Refills: 0 | Status: CANCELLED | OUTPATIENT
Start: 2023-06-08 | End: 2024-06-07

## 2023-06-08 RX ORDER — PEN NEEDLE, DIABETIC 29 G X1/2"
NEEDLE, DISPOSABLE MISCELLANEOUS
Qty: 90 EACH | Refills: 0 | Status: SHIPPED | OUTPATIENT
Start: 2023-06-08 | End: 2023-09-28 | Stop reason: SDUPTHER

## 2023-06-08 RX ORDER — ONDANSETRON 4 MG/1
8 TABLET, ORALLY DISINTEGRATING ORAL EVERY 8 HOURS PRN
Qty: 20 TABLET | Refills: 0 | OUTPATIENT
Start: 2023-06-08 | End: 2023-06-20

## 2023-06-08 RX ORDER — LANCING DEVICE
100 EACH MISCELLANEOUS 2 TIMES DAILY
Qty: 100 EACH | Refills: 1 | Status: SHIPPED | OUTPATIENT
Start: 2023-06-08 | End: 2024-06-07

## 2023-06-08 RX ORDER — PEN NEEDLE, DIABETIC 32GX 5/32"
NEEDLE, DISPOSABLE MISCELLANEOUS
Qty: 100 EACH | Refills: 3 | Status: SHIPPED | OUTPATIENT
Start: 2023-06-08 | End: 2023-07-20 | Stop reason: SDUPTHER

## 2023-06-08 RX ORDER — INSULIN ASPART 100 [IU]/ML
15 INJECTION, SUSPENSION SUBCUTANEOUS
Qty: 27 ML | Refills: 3 | Status: SHIPPED | OUTPATIENT
Start: 2023-06-08 | End: 2023-08-03 | Stop reason: SDUPTHER

## 2023-06-08 NOTE — PROGRESS NOTES
"  [facility]  [address] [facility] [address]      PATIENT NAME: Rick Coronado  : 1985  DATE: 23  MRN: [id number]      Billing Provider: Davey Reyes MD  Level of Service:   Patient PCP Information       Provider PCP Type    Davey Reyes MD General            Reason for Visit / Chief Complaint: Follow-up (No changes since last visit)       History of Present Illness / Problem Focused Workflow     Rick Coronado presents to the clinic with Follow-up (No changes since last visit)       Patient was last seen by me on 3/2. At that time he had no complaints. However, he was noted to have an A1c of 12.7.     Will order POC A1c today 12.8. Otherwise doing well. Unable to take his lispro due to work reasons and thus has not been taking lispro. Also was out of victoza.    PMH: diabetes, htn, eczema, gerd, obesity  Allergies: nkda  Surgeries: none  Family: Mother passsed away from COVID, diabetes. father is healthy. Sister-"beat cancer"  Social: neg times 3. Works [employer].    Review of Systems     10-point ROS performed and negative except as above.      Medical / Social / Family History     Past Medical History:   Diagnosis Date    Diabetes mellitus     Hypertension        No past surgical history on file.    Social History    reports that he has never smoked. He has never been exposed to tobacco smoke. He has never used smokeless tobacco. He reports that he does not drink alcohol and does not use drugs.    Family History  's family history includes Diabetes in his mother.    Medications and Allergies     Medications  Outpatient Medications Marked as Taking for the 23 encounter (Office Visit) with Davey Reyes MD   Medication Sig Dispense Refill    BD INSULIN SYRINGE ULTRA-FINE 0.5 mL 30 gauge x 1/2" Syrg USE TO INJECT LANTUS AT BEDTIME DAILY 90 each 0    BD ELSA 2ND GEN PEN NEEDLE 32 gauge x " Ndle " "USE WITH LANTUS DAILY 100 each 3    flash glucose sensor (FREESTYLE SULY 14 DAY SENSOR) Kit 1 Units by Misc.(Non-Drug; Combo Route) route continuous. 1 kit 0    insulin (LANTUS SOLOSTAR U-100 INSULIN) glargine 100 units/mL SubQ pen Inject 35 Units into the skin once daily. 10.5 mL 6    insulin lispro 100 unit/mL injection Inject 8 Units into the skin 3 (three) times daily before meals. 3 mL 5    liraglutide 0.6 mg/0.1 mL, 18 mg/3 mL, subq PNIJ (VICTOZA 2-GINO) 0.6 mg/0.1 mL (18 mg/3 mL) PnIj pen Inject 1.2 mg into the skin once daily. 3 mL 1    losartan (COZAAR) 25 MG tablet Take 1 tablet (25 mg total) by mouth once daily. 90 tablet 1    metFORMIN (GLUCOPHAGE) 1000 MG tablet Take 1 tablet (1,000 mg total) by mouth 2 (two) times daily. 60 tablet 1    ondansetron (ZOFRAN-ODT) 4 MG TbDL Take 2 tablets (8 mg total) by mouth every 8 (eight) hours as needed (nausea). 20 tablet 0    pen needle,diabetic dual safty (BD AUTOSHIELD DUO PEN NEEDLE) 30 gauge x 3/16" Ndle Use for diabetes 90 each 0    rosuvastatin (CRESTOR) 10 MG tablet TAKE 1 TABLET BY MOUTH EVERY DAY AT BEDTIME 30 tablet 3    tiZANidine (ZANAFLEX) 4 MG tablet Take 8 mg by mouth.      triamcinolone acetonide 0.025% (KENALOG) 0.025 % Oint Apply topically.      TRUE METRIX GLUCOSE TEST STRIP Strp USE TO TEST THREE TIMES DAILY 100 strip 5    TRUE METRIX GLUCOSE TEST STRIP Strp USE TO TEST THREE TIMES DAILY      TRUEPLUS LANCETS 33 gauge Misc USE TO TEST THREE TIMES DAILY 100 each 5    TRUEPLUS LANCETS 33 gauge Misc USE TO TEST THREE TIMES DAILY 100 each 5       Allergies  Review of patient's allergies indicates:  No Known Allergies    Physical Examination     Vitals:    06/08/23 1508   BP: 129/86   Pulse: 80   Resp: 18   Temp: 98.4 °F (36.9 °C)     General: AAOx3, NAD, alert and cooperative  HEENT: EOMI, normal conjunctiva  Neck: no LAD, no JVD, supple, no masses or thyromegaly  CVS: S1/S2 nml, RRR, no murmurs, rubs or gallops  Resp: CTA B/L, no rhonchi, rales, or " wheezing  GI: no TTP, not distended, BS+, obese  Skin: not jaundiced, warm, no rashes  Musculoskeletal: normal ROM, no joint tenderness, normal muscular development  Extremities: no peripheral edema, peripheral pulses intact  Neuro: CN II-XII grossly intact, strength and sensation symmetric and intact throughout, no focal neurological deficits        Results   Reviewed recent labs.        Assessment and Plan (including Health Maintenance)     Plan:   Rick was seen today for follow-up.    Diagnoses and all orders for this visit:    Diabetes mellitus due to underlying condition with hyperosmolarity without coma, without long-term current use of insulin  -     POCT HEMOGLOBIN A1C        Uncontrolled diabetes mellitus E11.65  POC A1c today 12.8  Unable to take lispro TID due to work and thus was not taking it or victoza.  Refilled metformin 1000 mg BID.  Will stop lantus  Continue Victoza 1.8 mg.   Will start 70/30 15 units bid. Sliding scale explained to patient.   Prescribe glucometer.  Refilled crestor 10 mg at bedtime.  Follow ADA diet.  Avoid soda, simple sweets, and limit rice/pasta/bread/starches and consume brown options when possible.   Maintain healthy weight with BMI goal <30.   Perform aerobic exercise for 150 minutes per week (or 5 days a week for 30 minutes each day).   Examine feet daily.   Eye exam: seeing eye doctor.  Foot exam: done on 3/2/23  Missed endocrine appt. Will ask patient to call and reschedule  Referred to dietician.    3. Morbid obesity with BMI of 45.0-49.9, adult E66.01  BMI 44. Goal BMI <30.  Aerobic exercise 150 minutes per week.  Avoid soda, simple sugars, sweets, excessive rice, pasta, potatoes or bread.   Choose brown options when available and portion control.  Limit fast foods and fried foods.   Choose complex carbs in moderation (ex: green, leafy vegetables, beans, oatmeal).  Eat vegetables with lean meats daily.   Consider permanent healthy lifestyle changes.  Continue victoza  1.8 mg    4. HTN (hypertension) I10  Continue losartan to 12.5 mg qd  Follow a low sodium (less than 2 grams of sodium per day), DASH diet.   Continue medications as prescribed.  Monitor blood pressure and report any consistent values greater than 140/90 and keep a log.  Maintain healthy weight with a BMI goal of <30.   Aerobic exercise for 150 minutes per week (or 5 days a week for 30 minutes each day).    5. GERD (gastroesophageal reflux disease) K21.9  Continue PPI  Avoid spicy, acidic, fried food and alcohol.   Eat 2-3 hours before bed.  Avoid tight fitting clothes and chew food thoroughly.   Reduce caffeine intake, avoid soda.   Take meds as prescribed.     6. Depression  No SI/HI  Will start zoloft 50 mg.     7. Tinea Pedis  Will give clotrimazole cream bid  Doing better    Routine labs.  F/u in 1 month    Health Maintenance Due   Topic Date Due    Hepatitis C Screening  Never done    COVID-19 Vaccine (1) Never done    HIV Screening  Never done    Diabetes Urine Screening  09/28/2021    Hemoglobin A1c  11/05/2022    Pneumococcal Vaccines (Age 0-64) (2 - PCV) 03/03/2023         Health Maintenance Topics with due status: Not Due       Topic Last Completion Date    TETANUS VACCINE 01/11/2019    Lipid Panel 08/05/2022    Foot Exam 03/02/2023    Eye Exam 03/02/2023    Influenza Vaccine Not Due       No follow-ups on file.     No future appointments.           Signature:  Davey Reyes MD  [facility]  [address] [facility] [address]    Date of encounter: 6/8/23

## 2023-06-14 ENCOUNTER — HOSPITAL ENCOUNTER (EMERGENCY)
Facility: HOSPITAL | Age: 38
Discharge: HOME | End: 2023-06-14
Attending: EMERGENCY MEDICINE
Payer: MEDICAID

## 2023-06-14 VITALS
HEART RATE: 98 BPM | SYSTOLIC BLOOD PRESSURE: 139 MMHG | WEIGHT: 297.06 LBS | HEIGHT: 69 IN | TEMPERATURE: 98 F | BODY MASS INDEX: 44 KG/M2 | DIASTOLIC BLOOD PRESSURE: 93 MMHG | OXYGEN SATURATION: 99 % | RESPIRATION RATE: 18 BRPM

## 2023-06-14 DIAGNOSIS — R11.2 NAUSEA AND VOMITING, UNSPECIFIED VOMITING TYPE: Primary | ICD-10-CM

## 2023-06-14 LAB
ANION GAP SERPL CALC-SCNC: 12 MEQ/L
APPEARANCE UR: CLEAR
BACTERIA #/AREA URNS AUTO: ABNORMAL /HPF
BASOPHILS # BLD AUTO: 0.02 X10(3)/MCL
BASOPHILS NFR BLD AUTO: 0.3 %
BILIRUB UR QL STRIP.AUTO: NEGATIVE MG/DL
BUN SERPL-MCNC: 9 MG/DL (ref 8.9–20.6)
CALCIUM SERPL-MCNC: 9.8 MG/DL (ref 8.4–10.2)
CHLORIDE SERPL-SCNC: 104 MMOL/L (ref 98–107)
CO2 SERPL-SCNC: 21 MMOL/L (ref 22–29)
COLOR UR: YELLOW
CREAT SERPL-MCNC: 0.89 MG/DL (ref 0.73–1.18)
CREAT/UREA NIT SERPL: 10
EOSINOPHIL # BLD AUTO: 0.04 X10(3)/MCL (ref 0–0.9)
EOSINOPHIL NFR BLD AUTO: 0.6 %
ERYTHROCYTE [DISTWIDTH] IN BLOOD BY AUTOMATED COUNT: 12.7 % (ref 11.5–17)
GFR SERPLBLD CREATININE-BSD FMLA CKD-EPI: >60 MLS/MIN/1.73/M2
GLUCOSE SERPL-MCNC: 246 MG/DL (ref 74–100)
GLUCOSE UR QL STRIP.AUTO: ABNORMAL MG/DL
HCT VFR BLD AUTO: 44 % (ref 42–52)
HGB BLD-MCNC: 14.5 G/DL (ref 14–18)
HYALINE CASTS #/AREA URNS LPF: ABNORMAL /LPF
IMM GRANULOCYTES # BLD AUTO: 0.02 X10(3)/MCL (ref 0–0.04)
IMM GRANULOCYTES NFR BLD AUTO: 0.3 %
KETONES UR QL STRIP.AUTO: ABNORMAL MG/DL
LEUKOCYTE ESTERASE UR QL STRIP.AUTO: NEGATIVE UNIT/L
LIPASE SERPL-CCNC: 17 U/L
LYMPHOCYTES # BLD AUTO: 2.81 X10(3)/MCL (ref 0.6–4.6)
LYMPHOCYTES NFR BLD AUTO: 42.9 %
MAGNESIUM SERPL-MCNC: 1.8 MG/DL (ref 1.6–2.6)
MCH RBC QN AUTO: 29.2 PG (ref 27–31)
MCHC RBC AUTO-ENTMCNC: 33 G/DL (ref 33–36)
MCV RBC AUTO: 88.7 FL (ref 80–94)
MONOCYTES # BLD AUTO: 0.45 X10(3)/MCL (ref 0.1–1.3)
MONOCYTES NFR BLD AUTO: 6.9 %
MUCOUS THREADS URNS QL MICRO: ABNORMAL /LPF
NEUTROPHILS # BLD AUTO: 3.21 X10(3)/MCL (ref 2.1–9.2)
NEUTROPHILS NFR BLD AUTO: 49 %
NITRITE UR QL STRIP.AUTO: NEGATIVE
NRBC BLD AUTO-RTO: 0 %
PH UR STRIP.AUTO: 5.5 [PH]
PLATELET # BLD AUTO: 321 X10(3)/MCL (ref 130–400)
PMV BLD AUTO: 10.6 FL (ref 7.4–10.4)
POCT GLUCOSE: 211 MG/DL (ref 70–110)
POCT GLUCOSE: 218 MG/DL (ref 70–110)
POTASSIUM SERPL-SCNC: 4.3 MMOL/L (ref 3.5–5.1)
PROT UR QL STRIP.AUTO: ABNORMAL MG/DL
RBC # BLD AUTO: 4.96 X10(6)/MCL (ref 4.7–6.1)
RBC #/AREA URNS AUTO: ABNORMAL /HPF
RBC UR QL AUTO: NEGATIVE UNIT/L
SODIUM SERPL-SCNC: 137 MMOL/L (ref 136–145)
SP GR UR STRIP.AUTO: 1.03
SQUAMOUS #/AREA URNS LPF: ABNORMAL /HPF
UROBILINOGEN UR STRIP-ACNC: NORMAL MG/DL
WBC # SPEC AUTO: 6.55 X10(3)/MCL (ref 4.5–11.5)
WBC #/AREA URNS AUTO: ABNORMAL /HPF

## 2023-06-14 PROCEDURE — 83690 ASSAY OF LIPASE: CPT | Performed by: EMERGENCY MEDICINE

## 2023-06-14 PROCEDURE — 83735 ASSAY OF MAGNESIUM: CPT | Performed by: EMERGENCY MEDICINE

## 2023-06-14 PROCEDURE — 96361 HYDRATE IV INFUSION ADD-ON: CPT

## 2023-06-14 PROCEDURE — 99284 EMERGENCY DEPT VISIT MOD MDM: CPT | Mod: 25

## 2023-06-14 PROCEDURE — 63600175 PHARM REV CODE 636 W HCPCS: Performed by: EMERGENCY MEDICINE

## 2023-06-14 PROCEDURE — 82962 GLUCOSE BLOOD TEST: CPT

## 2023-06-14 PROCEDURE — 80048 BASIC METABOLIC PNL TOTAL CA: CPT | Performed by: EMERGENCY MEDICINE

## 2023-06-14 PROCEDURE — 96374 THER/PROPH/DIAG INJ IV PUSH: CPT

## 2023-06-14 PROCEDURE — 81001 URINALYSIS AUTO W/SCOPE: CPT | Performed by: EMERGENCY MEDICINE

## 2023-06-14 PROCEDURE — 85025 COMPLETE CBC W/AUTO DIFF WBC: CPT | Performed by: EMERGENCY MEDICINE

## 2023-06-14 RX ORDER — ONDANSETRON 2 MG/ML
4 INJECTION INTRAMUSCULAR; INTRAVENOUS
Status: COMPLETED | OUTPATIENT
Start: 2023-06-14 | End: 2023-06-14

## 2023-06-14 RX ADMIN — ONDANSETRON 4 MG: 2 INJECTION INTRAMUSCULAR; INTRAVENOUS at 01:06

## 2023-06-14 RX ADMIN — SODIUM CHLORIDE, POTASSIUM CHLORIDE, SODIUM LACTATE AND CALCIUM CHLORIDE 1000 ML: 600; 310; 30; 20 INJECTION, SOLUTION INTRAVENOUS at 01:06

## 2023-06-14 NOTE — Clinical Note
"Rick Gonzalezy"YobanyRexburg was seen and treated in our emergency department on 6/14/2023.  He may return to work on 06/15/2023.       If you have any questions or concerns, please don't hesitate to call.      Radha Lazcano RN RN    "

## 2023-06-14 NOTE — ED PROVIDER NOTES
ED PROVIDER NOTE  6/14/2023    CHIEF COMPLAINT:   Chief Complaint   Patient presents with    Vomiting     States nausea, vomiting and pain since this am.         HISTORY OF PRESENT ILLNESS:   Rick Coronado is a 37 y.o. male who presents with chief complaint Nausea and vomiting.  Onset was yesterday when he states that he would had some nausea but was not vomiting.  This morning he states the nausea got worse and has had multiple episodes of vomiting aggravated with attempts to eat or drink anything.  States he tried taking sublingual Zofran but the taste of the sublingual tablet would just make him vomit as well.  Associated symptoms of intermittent crampy epigastric abdominal pain.  Denies fever, diarrhea, black or bloody stools, chest pain, shortness of breath.  No known sick contacts.    The history is provided by the patient.       REVIEW OF SYSTEMS: as noted in the HPI.  NURSING NOTES REVIEWED      PAST MEDICAL/SURGICAL HISTORY:   Past Medical History:   Diagnosis Date    Diabetes mellitus     Hypertension     History reviewed. No pertinent surgical history.    FAMILY HISTORY:   Family History   Problem Relation Age of Onset    Diabetes Mother        SOCIAL HISTORY:   Social History     Tobacco Use    Smoking status: Never     Passive exposure: Never    Smokeless tobacco: Never   Substance Use Topics    Alcohol use: Never    Drug use: Never       ALLERGIES: Review of patient's allergies indicates:  No Known Allergies    PHYSICAL EXAM:  Initial Vitals [06/14/23 0048]   BP Pulse Resp Temp SpO2   (!) 139/93 98 18 98.1 °F (36.7 °C) 99 %      MAP       --         Physical Exam    Nursing note and vitals reviewed.  Constitutional: He appears well-developed and well-nourished. He has a sickly appearance (Actively vomiting).   HENT:   Head: Normocephalic and atraumatic.   Nose: Nose normal.   Mouth/Throat: Oropharynx is clear and moist and mucous membranes are normal.   Eyes: Conjunctivae and EOM are normal.  Pupils are equal, round, and reactive to light.   Neck: Neck supple. No tracheal deviation present.   Cardiovascular:  Normal rate, regular rhythm, normal heart sounds, intact distal pulses and normal pulses.           Pulmonary/Chest: Effort normal and breath sounds normal. No respiratory distress.   Abdominal: Abdomen is soft. There is no abdominal tenderness. There is no rebound and no guarding.   Musculoskeletal:         General: Normal range of motion.      Cervical back: Neck supple.     Neurological: He is alert and oriented to person, place, and time. GCS eye subscore is 4. GCS verbal subscore is 5. GCS motor subscore is 6.   CN II-XII intact. Moves all extremities. No gross sensory or motor deficits.   Skin: Skin is warm, dry and intact.   Psychiatric: He has a normal mood and affect. His speech is normal and behavior is normal. Judgment and thought content normal. Cognition and memory are normal.       RESULTS:  Labs Reviewed   BASIC METABOLIC PANEL - Abnormal; Notable for the following components:       Result Value    Carbon Dioxide 21 (*)     Glucose Level 246 (*)     All other components within normal limits   URINALYSIS, REFLEX TO URINE CULTURE - Abnormal; Notable for the following components:    Protein, UA 1+ (*)     Glucose, UA 4+ (*)     Ketones, UA Trace (*)     Squamous Epithelial Cells, UA Trace (*)     Mucous, UA Occ (*)     All other components within normal limits   CBC WITH DIFFERENTIAL - Abnormal; Notable for the following components:    MPV 10.6 (*)     All other components within normal limits   POCT GLUCOSE - Abnormal; Notable for the following components:    POCT Glucose 218 (*)     All other components within normal limits   LIPASE - Normal   MAGNESIUM - Normal   CBC W/ AUTO DIFFERENTIAL    Narrative:     The following orders were created for panel order CBC auto differential.  Procedure                               Abnormality         Status                     ---------                                -----------         ------                     CBC with Differential[[id number]]        Abnormal            Final result                 Please view results for these tests on the individual orders.     Imaging Results    None         PROCEDURES:  Procedures    ECG:       ED COURSE AND MEDICAL DECISION MAKING:  Medications   lactated ringers bolus 1,000 mL (0 mLs Intravenous Stopped 6/14/23 0221)   ondansetron injection 4 mg (4 mg Intravenous Given 6/14/23 0121)     ED Course as of 06/14/23 0542   Wed Jun 14, 2023   0248 Nausea and vomiting resolved after IV Zofran with successful p.o. challenge. [IB]      ED Course User Index  [IB] Anish Coronado DO        Medical Decision Making  37-year-old male who presents with nausea and vomiting with intermittent crampy epigastric abdominal pain.  Given IV Zofran along with LR.  CBC shows no leukocytosis or leukopenia.  BNP shows normal BUN and creatinine, bicarb 21, glucose 246, anion gap 12.  Lipase normal.  Magnesium level normal.  UA does shows trace ketones, no infection.  Repeat glucose 218.  Nausea and vomiting resolved with Zofran.  I do not feel that he is in DKA and is appropriate for discharge home with close outpatient follow up with his PCP.  Instructed to continue to hydrate and monitor his blood sugar.  Given strict ED return precautions. I have spoken with the patient and/or caregivers. I have explained the patient's condition, diagnoses and treatment plan based on the information available to me at this time. I have answered the patient's and/or caregiver's questions and addressed any concerns. The patient and/or caregivers have as good an understanding of the patient's diagnosis, condition and treatment plan as can be expected at this point. The vital signs have been stable. The patient's condition is stable and appropriate for discharge from the emergency department.     The patient will pursue further outpatient evaluation with the primary  care physician or other designated or consulting physician as outlined in the discharge instructions. The patient and/or caregivers are agreeable to this plan of care and follow-up instructions have been explained in detail. The patient and/or caregivers have received these instructions in written format and have expressed an understanding of the discharge instructions. The patient and/or caregivers are aware that any significant change in condition or worsening of symptoms should prompt an immediate return to this or the closest emergency department or a call to 911.    Amount and/or Complexity of Data Reviewed  Labs: ordered. Decision-making details documented in ED Course.    Risk  Prescription drug management.        CLINICAL IMPRESSION:  1. Nausea and vomiting, unspecified vomiting type        DISPOSITION:   ED Disposition Condition    Discharge Stable            ED Prescriptions    None       Follow-up Information       Follow up With Specialties Details Why Contact Info    Davey Reyes MD Internal Medicine Schedule an appointment as soon as possible for a visit   [address] [facility] [place] [address]  [phone]      [facility] Emergency Medicine  If symptoms worsen [address] [facility] [id number] [phone]               Anish Coronado,   06/14/23 0542

## 2023-06-20 ENCOUNTER — HOSPITAL ENCOUNTER (EMERGENCY)
Facility: HOSPITAL | Age: 38
Discharge: HOME | End: 2023-06-20
Attending: INTERNAL MEDICINE
Payer: MEDICAID

## 2023-06-20 VITALS
DIASTOLIC BLOOD PRESSURE: 82 MMHG | TEMPERATURE: 98 F | OXYGEN SATURATION: 98 % | RESPIRATION RATE: 16 BRPM | HEART RATE: 93 BPM | HEIGHT: 69 IN | SYSTOLIC BLOOD PRESSURE: 129 MMHG | BODY MASS INDEX: 44.73 KG/M2 | WEIGHT: 302 LBS

## 2023-06-20 DIAGNOSIS — K21.9 GASTROESOPHAGEAL REFLUX DISEASE WITHOUT ESOPHAGITIS: Primary | ICD-10-CM

## 2023-06-20 PROCEDURE — 25000003 PHARM REV CODE 250: Performed by: PHYSICIAN ASSISTANT

## 2023-06-20 PROCEDURE — 99284 EMERGENCY DEPT VISIT MOD MDM: CPT

## 2023-06-20 RX ORDER — LIDOCAINE HYDROCHLORIDE 20 MG/ML
15 SOLUTION OROPHARYNGEAL ONCE
Status: DISCONTINUED | OUTPATIENT
Start: 2023-06-20 | End: 2023-06-20

## 2023-06-20 RX ORDER — FAMOTIDINE 20 MG/1
20 TABLET, FILM COATED ORAL
Status: COMPLETED | OUTPATIENT
Start: 2023-06-20 | End: 2023-06-20

## 2023-06-20 RX ORDER — SUCRALFATE 1 G/1
1 TABLET ORAL
Qty: 56 TABLET | Refills: 0 | Status: SHIPPED | OUTPATIENT
Start: 2023-06-20 | End: 2023-07-04

## 2023-06-20 RX ORDER — ONDANSETRON 4 MG/1
4 TABLET, ORALLY DISINTEGRATING ORAL EVERY 8 HOURS PRN
Qty: 15 TABLET | Refills: 0 | OUTPATIENT
Start: 2023-06-20 | End: 2023-07-25

## 2023-06-20 RX ORDER — PANTOPRAZOLE SODIUM 20 MG/1
20 TABLET, DELAYED RELEASE ORAL DAILY
Qty: 30 TABLET | Refills: 1 | Status: SHIPPED | OUTPATIENT
Start: 2023-06-20 | End: 2023-08-19

## 2023-06-20 RX ORDER — MAG HYDROX/ALUMINUM HYD/SIMETH 200-200-20
30 SUSPENSION, ORAL (FINAL DOSE FORM) ORAL ONCE
Status: COMPLETED | OUTPATIENT
Start: 2023-06-20 | End: 2023-06-20

## 2023-06-20 RX ADMIN — ALUMINUM HYDROXIDE, MAGNESIUM HYDROXIDE, AND SIMETHICONE 30 ML: 200; 200; 20 SUSPENSION ORAL at 12:06

## 2023-06-20 RX ADMIN — FAMOTIDINE 20 MG: 20 TABLET, FILM COATED ORAL at 12:06

## 2023-06-20 NOTE — DISCHARGE INSTRUCTIONS
Avoid acidic and greasy foods.  Follow GERD diet.   Take Protonix in morning 20-30 minuets before eating.  Referral sent to GI clinic.  Follow up with your primary care provider within 2-3 days.

## 2023-06-20 NOTE — Clinical Note
"Rick Haywood (Jeremy)ard was seen and treated in our emergency department on 6/20/2023.  He may return to work on 06/21/2023.       If you have any questions or concerns, please don't hesitate to call.      ABBIE Shetty RN    "

## 2023-06-23 NOTE — ED PROVIDER NOTES
"Encounter Date: 6/20/2023       History     Chief Complaint   Patient presents with    Emesis     RECURRENT EPIGASTRIC PAIN W NV SINCE LAST PM.  PT STATES  NO MEDS GIVEN FOR "ACID" FROM LAST ER VISIT 6/16/23.      37-year-old male with a history of diabetes and hypertension, presents to the emergency department with recurrent epigastric pain, nausea and vomiting that began last night.  He states he was seen recently in the emergency department with the same symptoms however was not prescribed any medications for his acid reflux.  Patient states he does not need repeat blood work however would like some prescriptions to help with symptoms.  He does admit to eating a lot of red sauce and greasy food.  He denies fever, chills, chest pain, shortness of breath, cough, dizziness, diarrhea.    The history is provided by the patient. No  was used.   Review of patient's allergies indicates:  No Known Allergies  Past Medical History:   Diagnosis Date    Diabetes mellitus     Hypertension      History reviewed. No pertinent surgical history.  Family History   Problem Relation Age of Onset    Diabetes Mother      Social History     Tobacco Use    Smoking status: Never     Passive exposure: Never    Smokeless tobacco: Never   Substance Use Topics    Alcohol use: Never    Drug use: Never     Review of Systems   Constitutional:  Negative for chills and fever.   Eyes: Negative.    Respiratory:  Negative for cough and shortness of breath.    Cardiovascular:  Negative for chest pain and palpitations.   Gastrointestinal:  Positive for nausea and vomiting. Negative for abdominal pain (epigastric).   Genitourinary:  Negative for dysuria and flank pain.   Musculoskeletal:  Negative for back pain and neck pain.   Skin:  Negative for rash.   Neurological:  Negative for dizziness, light-headedness and headaches.     Physical Exam     Initial Vitals [06/20/23 1042]   BP Pulse Resp Temp SpO2   129/82 93 16 97.9 °F (36.6 " °C) 98 %      MAP       --         Physical Exam    Nursing note and vitals reviewed.  Constitutional: He appears well-developed and well-nourished.   HENT:   Nose: Nose normal.   Mouth/Throat: Oropharynx is clear and moist.   Eyes: Conjunctivae are normal.   Neck: Neck supple.   Normal range of motion.  Cardiovascular:  Normal rate, regular rhythm, normal heart sounds and intact distal pulses.           Pulmonary/Chest: Breath sounds normal. No respiratory distress. He has no wheezes. He exhibits no tenderness.   Abdominal: Abdomen is soft. Bowel sounds are normal. There is no abdominal tenderness. There is no rebound and no guarding.   Musculoskeletal:         General: Normal range of motion.      Cervical back: Normal range of motion and neck supple.     Neurological: He is alert. GCS eye subscore is 4. GCS verbal subscore is 5. GCS motor subscore is 6.   Skin: Skin is warm. Capillary refill takes less than 2 seconds.       ED Course   Procedures  Labs Reviewed - No data to display       Imaging Results    None          Medications   famotidine tablet 20 mg (20 mg Oral Given 6/20/23 1224)   aluminum-magnesium hydroxide-simethicone 200-200-20 mg/5 mL suspension 30 mL (30 mLs Oral Given 6/20/23 1224)     Medical Decision Making:   Initial Assessment:   37-year-old male with a history of diabetes and hypertension, presents to the emergency department with recurrent epigastric pain, nausea and vomiting that began last night.  Differential Diagnosis:   GERD  Peptic ulcer  Esophagitis  Hiatal hernia  H pylori  ED Management:  Medications given:  Famotidine 20 mg tablet, aluminum magnesium hydroxide-simethicone 200-20-20    Prescribed Zofran, Protonix and Carafate.    Discussed GERD diet and referred to GI clinic.    Gave strict ED precautions.  The patient is resting comfortably and in no acute distress.  He states that his symptoms have improved after treatment in Emergency Department. I personally discussed his test  results and treatment plan.  Gave strict ED precautions and specific conditions for return to the emergency department and importance of follow up with pcp and GI.  Patient voices understanding and agrees to the plan discussed. All patients' questions have been answered at this time. He has remained hemodynamically stable throughout entire stay in ED and is stable for discharge home.                         Clinical Impression:   Final diagnoses:  [K21.9] Gastroesophageal reflux disease without esophagitis (Primary)        ED Disposition Condition    Discharge Stable          ED Prescriptions       Medication Sig Dispense Start Date End Date Auth. Provider    pantoprazole (PROTONIX) 20 MG tablet Take 1 tablet (20 mg total) by mouth once daily. 30 tablet 6/20/2023 8/19/2023 KIYA Lopez    ondansetron (ZOFRAN-ODT) 4 MG TbDL Take 1 tablet (4 mg total) by mouth every 8 (eight) hours as needed (nausea). 15 tablet 6/20/2023 -- KIYA Lopez    sucralfate (CARAFATE) 1 gram tablet Take 1 tablet (1 g total) by mouth 4 (four) times daily before meals and nightly. for 14 days 56 tablet 6/20/2023 7/4/2023 KIYA Lopez          Follow-up Information       Follow up With Specialties Details Why Contact Info Additional Information    [facility] Emergency Medicine  As needed, If symptoms worsen [address] [facility] [address] [phone]     [facility] Gastroenterology  they will call to schedule apt [address] [facility] [address] [phone] Entrance 1             KIYA Lopez  06/22/23 6977

## 2023-07-10 ENCOUNTER — NUTRITION (OUTPATIENT)
Dept: NUTRITION | Facility: HOSPITAL | Age: 38
End: 2023-07-10
Attending: INTERNAL MEDICINE
Payer: MEDICAID

## 2023-07-10 VITALS — HEIGHT: 69 IN | WEIGHT: 306 LBS | BODY MASS INDEX: 45.32 KG/M2

## 2023-07-10 DIAGNOSIS — E08.00 DIABETES MELLITUS DUE TO UNDERLYING CONDITION WITH HYPEROSMOLARITY WITHOUT COMA, WITHOUT LONG-TERM CURRENT USE OF INSULIN: ICD-10-CM

## 2023-07-10 PROCEDURE — 97802 MEDICAL NUTRITION INDIV IN: CPT

## 2023-07-10 NOTE — PROGRESS NOTES
"Nutrition Note: 7/10/2023   Referring Provider: Davey Reyes MD  Reason for visit: Obesity/Weight Management , Type 2 Diabetes , and GERD  Consultation Time: 60 Minutes     A = NUTRITION ASSESSMENT   Patient Information:    Rick Coronado  : 1985   37 y.o. male    Allergies/Intolerances: No known food allergies  Social Data: lives with spouse/significant Other and child Anthropometrics:     Wt: (!) 138.8 kg (306 lb)                                   Ht 5' 9" (1.753 m)   BMI: Body mass index is 45.19 kg/m².  IBW: 138.8 kg (191.25% IBW)       Supplements/Vitamins:    MVI/Supp: No  Drug/Nutrient interactions: None Noted Activity Level:     Low Active      Form of Activity: walking more at work     Food/Nutrition-related hx:  Inspired to get healthier as he is about to have second child. Highest weight 330 lbs. Patient recently made major diet changes.  Has difficulty monitoring his own blood sugar; due to disliking pricking his own finger, has family prick it. Would like continuous glucose monitoring device. Recommended following up with number given by MD. .    Diet/PO Recall:   Appetite: Good  Fluid Intake: Adequate  Diet Recall:  630 am: Take the protonix and metformn and injectible morning insulin.  ON weekend: honey nut cheerios with 2% milk: corn flakes with splenda and 2% milk  At Work:   Breakfast: 745 am: egg and cheese biscuit (little restaurant) [trying to stay away from chand and sauge]  Lunch: 1130 am: 2 thin slices ham, bergeron, wheat bread (under 5 minutes) or super one salad ( salad)  Dinner: 8 pm: baked chicken, steamer bag of broccoli rice   Snacks:  cut out snacks x/day  Drinks: diet dr pepper, water, body armor lindsay  ONS: never  N/V/C/D:  Reflux and when severe vomiting*  Eating out:  picks up breakfat 2-3 x/week.   Cultural/Spiritual/Personal Preferences: none expressed  Patient Notes/Reports: Patient has been dealing with GI issues for 3 years. Evaluated by GI and dx with " "GERD. Patient came to nutrition class and seen RD previously. Family not currently following any special diet. Current/previous symptoms include Previously was on stomach medicine. 3 years ago had some blood and was throwing up a lot. Was put on 3 medications at recent ER visit; carafate, protonix, and zofran. Take carafate and protonix daily but rarely using zofran. Pt had severe reflux with spaghetti. Difficulty swallowing large bills and thick food. Avoiding tomatoes, oranges   Medical Tests and Procedures:  There is no problem list on file for this patient.     Past Medical History:   Diagnosis Date    Diabetes mellitus     Hypertension      No past surgical history on file.    Current Outpatient Medications   Medication Instructions    BD INSULIN SYRINGE ULTRA-FINE 0.5 mL 30 gauge x 1/2" Syrg USE TO INJECT LANTUS AT BEDTIME DAILY    BD ELSA 2ND GEN PEN NEEDLE 32 gauge x 5/32" Ndle USE WITH LANTUS DAILY    blood sugar diagnostic, drum (ACCU-CHEK COMPACT TEST) Strp 100 each, Misc.(Non-Drug; Combo Route), 2 times daily    blood-glucose meter, drum-type Kit 1 each, Misc.(Non-Drug; Combo Route), 2 times daily    cetirizine (ZYRTEC) 10 mg, Oral, Daily    clotrimazole (LOTRIMIN) 1 % cream Topical (Top), 2 times daily    famotidine (PEPCID) 20 mg, Oral, 2 times daily    flash glucose sensor (FREESTYLE SULY 14 DAY SENSOR) Kit 1 Units, Misc.(Non-Drug; Combo Route), Continuous    fluticasone propionate (FLONASE) 50 mcg, Each Nostril, 2 times daily PRN    insulin aspart protamine-insulin aspart (NOVOLOG MIX 70-30FLEXPEN U-100) 100 unit/mL (70-30) InPn pen 15 Units, Subcutaneous, 2 times daily before meals    lancing device (ACCU-CHEK SOFTCLIX LANCET DEV) Misc 100 each, Misc.(Non-Drug; Combo Route), 2 times daily    losartan (COZAAR) 25 mg, Oral, Daily    metFORMIN (GLUCOPHAGE) 1,000 mg, Oral, 2 times daily    ondansetron (ZOFRAN-ODT) 4 mg, Oral, Every 8 hours PRN    pantoprazole (PROTONIX) 20 mg, Oral, Daily    pen " "needle,diabetic dual safty (BD AUTOSHIELD DUO PEN NEEDLE) 30 gauge x 3/16" Ndle Use for diabetes    rosuvastatin (CRESTOR) 10 MG tablet TAKE 1 TABLET BY MOUTH EVERY DAY AT BEDTIME    sertraline (ZOLOFT) 50 mg, Oral, Daily    tiZANidine (ZANAFLEX) 8 mg, Oral    triamcinolone acetonide 0.025% (KENALOG) 0.025 % Oint Topical    TRUE METRIX GLUCOSE TEST STRIP Strp USE TO TEST THREE TIMES DAILY    TRUE METRIX GLUCOSE TEST STRIP Strp USE TO TEST THREE TIMES DAILY    TRUEPLUS LANCETS 33 gauge Misc USE TO TEST THREE TIMES DAILY    TRUEPLUS LANCETS 33 gauge Misc USE TO TEST THREE TIMES DAILY    VICTOZA 3-GINO 1.8 mg, Subcutaneous, Daily      Labs:    Lab Results   Component Value Date    WBC 6.55 06/14/2023    HGB 14.5 06/14/2023    HCT 44.0 06/14/2023    CHOL 184 08/05/2022    TRIG 148 (H) 08/05/2022    HDL 36 08/05/2022    HGBA1C 12.2 (H) 08/05/2022     06/14/2023    K 4.3 06/14/2023    CALCIUM 9.8 06/14/2023         D = NUTRITION DIAGNOSIS    PES Statement:   Primary Problem:  Inappropriate carbohydrate intake  related to  Type 2 diabetes  as evidenced by  A1C 12.2 and POCT glucose 218 .      Secondary Problem: Overweight/Obesity  related to excessive energy intake and physical inactivity  as evidenced by  BMI 45.19 .      I = NUTRITION INTERVENTION   Discussed healthy plate method on healthy eating, incorporating more fruits/vegetables, whole grains, and eliminating high calorie/sugary beverages.  Discussed sugary foods and/or beverages (potential health consequences of excessive sugar intake, ways to reduce sugar intake, healthy beverage alternatives, etc.). Discussed nutrient-dense meals and snacks versus empty-calories.  Discussed benefits of adequate hydration and recommended fluid intake. Complimented patient on dietary compliance/modifications and resulting health improvements. Discussed GERD/Reflux nutrition therapy.  Answered parents/patient's questions appropriately.      Patient  active and engaged during " "session and verbalized desire to make changes. Contact information provided, understanding verbalized and compliance expected.   Estimated Energy/Fluid Requirements:   Weight used: .8 kg  Calories: 2250 kcal/day ([place] [address] Jeor * AF 1.3= 3007 - 750 kcal for wt loss)  Protein: 111-139 g/day (0.8-1 g/kg)  Fluid: 1681-0141 mL/day (Holiday Segar) or per MD.    Education Materials Provided:   Nutrition Plan, Healthy Plate Method, and GERD   Recommendations:   Ensure balanced eating pattern of 3 meals, 1-2 snacks daily. Avoid skipped meals.    Focus on protein at all meals and snacks. Examples provided.  Keep portions appropriate by measuring and weighing all foods or using body (fist, palm, etc)   Avoid acid reflux trigger foods orange, caffeine/coffee, tomatoes, and acidic carbonated drinks.   Chew food into a paste before swallowing       M/E = NUTRITION MONITORING AND EVALUATION   Goal 1: Weight loss of 5lb/mo or 10% weight loss (30lb) within 6 months  Indicator: Weight/BMI    Goal 2: Diet recall shows decrease/improvements in high calorie foods/drinks and consuming balanced eating pattern including lean proteins, whole grains, and fruit/vegetables by next RD visit.   Indicator: Diet Recall     F/U: 1-3 months by patient request and MD referall Family/Patient provided with Dietitian contact number and advised to call or make future appointment if further intervention is needed.    Communication with provider via Epic    Signature: Lucinda Russo (Katie), MS, RDN, LDN      "

## 2023-07-10 NOTE — LETTER
07/10/2023    To Employee Workplace             [facility]  [address] [address]  Phone: [phone]  Fax: [phone]   07/10/2023    Patient: Rick Coronado   YOB: 1985   Date of Visit: 7/10/2023       To Whom it May Concern:    Rick Coronado was seen in my clinic on 7/10/2023. He may return with no restrictions.    If you have any questions or concerns, please don't hesitate to call.    Sincerely,         Lucinda Russo RD

## 2023-07-20 ENCOUNTER — OFFICE VISIT (OUTPATIENT)
Dept: INTERNAL MEDICINE | Facility: CLINIC | Age: 38
End: 2023-07-20
Payer: MEDICAID

## 2023-07-20 VITALS
WEIGHT: 310.38 LBS | SYSTOLIC BLOOD PRESSURE: 119 MMHG | HEIGHT: 69 IN | TEMPERATURE: 98 F | HEART RATE: 78 BPM | BODY MASS INDEX: 45.97 KG/M2 | OXYGEN SATURATION: 98 % | RESPIRATION RATE: 18 BRPM | DIASTOLIC BLOOD PRESSURE: 72 MMHG

## 2023-07-20 DIAGNOSIS — Z79.4 TYPE 2 DIABETES MELLITUS WITH OTHER SPECIFIED COMPLICATION, WITH LONG-TERM CURRENT USE OF INSULIN: ICD-10-CM

## 2023-07-20 DIAGNOSIS — E11.69 TYPE 2 DIABETES MELLITUS WITH OTHER SPECIFIED COMPLICATION, WITH LONG-TERM CURRENT USE OF INSULIN: ICD-10-CM

## 2023-07-20 PROCEDURE — 99215 OFFICE O/P EST HI 40 MIN: CPT | Mod: PBBFAC | Performed by: INTERNAL MEDICINE

## 2023-07-20 RX ORDER — PEN NEEDLE, DIABETIC 32GX 5/32"
NEEDLE, DISPOSABLE MISCELLANEOUS
Qty: 100 EACH | Refills: 3 | Status: SHIPPED | OUTPATIENT
Start: 2023-07-20 | End: 2023-09-28 | Stop reason: SDUPTHER

## 2023-07-20 NOTE — PROGRESS NOTES
"  [facility]  [address] [facility] [address]      PATIENT NAME: Rick Coronado  : 1985  DATE: 23  MRN: [id number]      Billing Provider: Davey Reyes MD  Level of Service:   Patient PCP Information       Provider PCP Type    Davey Reyes MD General            Reason for Visit / Chief Complaint: Follow-up (1 month follow up, only check ing blood sugar in the morning.)       History of Present Illness / Problem Focused Workflow     Rick Coronado presents to the clinic with Follow-up (1 month follow up, only check ing blood sugar in the morning.)         Patient was last seen by me back on 2023.  He does have a history of uncontrolled diabetes.  His last point of care A1c back in  was 12.8.    Today, has no complaints. States that he has been taking the 70/30 twice a day, continues to take metformin and victoza. Currently taking 18 units of 70/30 an states sugars are under 160.     PMH: diabetes, htn, eczema, gerd, obesity  Allergies: nkda  Surgeries: none  Family: Mother passsed away from COVID, diabetes. father is healthy. Sister-"beat cancer"  Social: neg times 3. Works [employer].    Review of Systems     10-point ROS performed and negative except as above.      Medical / Social / Family History     Past Medical History:   Diagnosis Date    Diabetes mellitus     Hypertension        No past surgical history on file.    Social History    reports that he has never smoked. He has never been exposed to tobacco smoke. He has never used smokeless tobacco. He reports that he does not drink alcohol and does not use drugs.    Family History  's family history includes Diabetes in his mother.    Medications and Allergies     Medications  Outpatient Medications Marked as Taking for the 23 encounter (Office Visit) with Davey Reyes MD   Medication Sig Dispense Refill    BD ELSA 2ND GEN PEN " "NEEDLE 32 gauge x 5/32" Ndle USE WITH LANTUS DAILY 100 each 3    blood sugar diagnostic, drum (ACCU-CHEK COMPACT TEST) Strp 100 each by Misc.(Non-Drug; Combo Route) route 2 (two) times a day. 100 each 2    blood-glucose meter, drum-type Kit 1 each by Misc.(Non-Drug; Combo Route) route 2 (two) times a day. 1 kit 0    insulin aspart protamine-insulin aspart (NOVOLOG MIX 70-30FLEXPEN U-100) 100 unit/mL (70-30) InPn pen Inject 15 Units into the skin 2 (two) times daily before meals. 27 mL 3    lancing device (ACCU-CHEK SOFTCLIX LANCET DEV) Misc 100 each by Misc.(Non-Drug; Combo Route) route 2 (two) times a day. 100 each 1    liraglutide 0.6 mg/0.1 mL, 18 mg/3 mL, subq PNIJ (VICTOZA 3-GINO) 0.6 mg/0.1 mL (18 mg/3 mL) PnIj pen Inject 1.8 mg into the skin once daily. 9 mL 11    pantoprazole (PROTONIX) 20 MG tablet Take 1 tablet (20 mg total) by mouth once daily. 30 tablet 1    pen needle,diabetic dual safty (BD AUTOSHIELD DUO PEN NEEDLE) 30 gauge x 3/16" Ndle Use for diabetes 90 each 0    rosuvastatin (CRESTOR) 10 MG tablet TAKE 1 TABLET BY MOUTH EVERY DAY AT BEDTIME 30 tablet 3    triamcinolone acetonide 0.025% (KENALOG) 0.025 % Oint Apply topically.      TRUE METRIX GLUCOSE TEST STRIP Strp USE TO TEST THREE TIMES DAILY 100 strip 5    TRUE METRIX GLUCOSE TEST STRIP Strp USE TO TEST THREE TIMES DAILY      TRUEPLUS LANCETS 33 gauge Misc USE TO TEST THREE TIMES DAILY 100 each 5    TRUEPLUS LANCETS 33 gauge Misc USE TO TEST THREE TIMES DAILY 100 each 5       Allergies  Review of patient's allergies indicates:  No Known Allergies    Physical Examination     Vitals:    07/20/23 1502   BP: 119/72   Pulse: 78   Resp: 18   Temp: 97.8 °F (36.6 °C)     General: AAOx3, NAD, alert and cooperative  HEENT:  EOMI, normal conjunctiva  Neck: supple, no masses or thyromegaly  CVS: S1/S2 nml, RRR, no murmurs, rubs or gallops  Resp: CTA B/L, no rhonchi, rales, or wheezing  GI: no TTP, not distended, BS+  Skin: not jaundiced, warm, no " "rashes  Musculoskeletal: normal ROM, no joint tenderness, normal muscular development  Extremities: no peripheral edema, peripheral pulses intact  Neuro: CN II-XII grossly intact, strength and sensation symmetric and intact throughout, no focal neurological deficits        Results   Reviewed recent labs.        Assessment and Plan (including Health Maintenance)     Plan:   Rick was seen today for follow-up.    Diagnoses and all orders for this visit:    Type 2 diabetes mellitus with other specified complication, with long-term current use of insulin  The following orders have not been finalized:  -     BD ELSA 2ND GEN PEN NEEDLE 32 gauge x 5/32" Ndle        Uncontrolled diabetes mellitus E11.65  POC A1c on 6/8/23 was 12.8  Currently on metformin 1000 mg bid, victoza 1.8 mg, and 70/30 18 units bid on sliding scale.  States his glucose readings are in the 160s.  Advised to increase by another 2 units every 5 days until glucose is less than 120.  Refilled crestor 10 mg at bedtime.  Follow ADA diet.  Avoid soda, simple sweets, and limit rice/pasta/bread/starches and consume brown options when possible.   Will refer to nutritional services.  Has seen diabetic education.  Maintain healthy weight with BMI goal <30.   Perform aerobic exercise for 150 minutes per week (or 5 days a week for 30 minutes each day).   Examine and wash feet daily.   Eye exam: seeing eye doctor. Going again in November.  Foot exam: done on 3/2/23  Missed endocrine appt. Will ask patient to call and reschedule  Referred to dietician, nutritional services, and endocrine    3. Morbid obesity with BMI of 45.0-49.9, adult E66.01  BMI 44. Goal BMI <30.  Aerobic exercise 150 minutes per week.  Avoid soda, simple sugars, sweets, excessive rice, pasta, potatoes or bread.   Choose brown options when available and portion control.  Limit fast foods and fried foods.   Choose complex carbs in moderation (ex: green, leafy vegetables, beans, oatmeal).  Eat " vegetables with lean meats daily.   Consider permanent healthy lifestyle changes.  Continue victoza 1.8 mg    4. HTN (hypertension) I10  BP today 119/72  Continue losartan to 12.5 mg qd  Follow a low sodium (less than 2 grams of sodium per day), DASH diet.   Continue medications as prescribed.  Monitor blood pressure and report any consistent values greater than 140/90 and keep a log.  Maintain healthy weight with a BMI goal of <30.   Aerobic exercise for 150 minutes per week (or 5 days a week for 30 minutes each day).    5. GERD (gastroesophageal reflux disease) K21.9  Continue PPI  Avoid spicy, acidic, fried food and alcohol.   Eat 2-3 hours before bed.  Avoid tight fitting clothes and chew food thoroughly.   Reduce caffeine intake, avoid soda.   Take meds as prescribed.     6. Depression  No SI/HI  Will start zoloft 50 mg.     7. Tinea Pedis  Will give clotrimazole cream bid  Doing better    Routine labs.  F/u in 2 month    Health Maintenance Due   Topic Date Due    Hepatitis C Screening  Never done    COVID-19 Vaccine (1) Never done    HIV Screening  Never done    Diabetes Urine Screening  09/28/2021         Health Maintenance Topics with due status: Not Due       Topic Last Completion Date    TETANUS VACCINE 01/11/2019    Lipid Panel 08/05/2022    Foot Exam 03/02/2023    Eye Exam 03/02/2023    Hemoglobin A1c 06/08/2023    Influenza Vaccine Not Due       No follow-ups on file.     No future appointments.           Signature:  Davey Reyes MD  [facility]  [address] [address]    Date of encounter: 7/20/23

## 2023-07-25 ENCOUNTER — TELEPHONE (OUTPATIENT)
Dept: INTERNAL MEDICINE | Facility: CLINIC | Age: 38
End: 2023-07-25

## 2023-07-25 ENCOUNTER — HOSPITAL ENCOUNTER (EMERGENCY)
Facility: HOSPITAL | Age: 38
Discharge: HOME | End: 2023-07-25
Attending: EMERGENCY MEDICINE
Payer: MEDICAID

## 2023-07-25 VITALS
WEIGHT: 310 LBS | HEIGHT: 69 IN | BODY MASS INDEX: 45.91 KG/M2 | OXYGEN SATURATION: 99 % | HEART RATE: 93 BPM | SYSTOLIC BLOOD PRESSURE: 133 MMHG | DIASTOLIC BLOOD PRESSURE: 99 MMHG | RESPIRATION RATE: 18 BRPM | TEMPERATURE: 99 F

## 2023-07-25 DIAGNOSIS — R11.2 NAUSEA AND VOMITING, UNSPECIFIED VOMITING TYPE: Primary | ICD-10-CM

## 2023-07-25 LAB
ALBUMIN SERPL-MCNC: 3.4 G/DL (ref 3.5–5)
ALBUMIN/GLOB SERPL: 0.8 RATIO (ref 1.1–2)
ALP SERPL-CCNC: 56 UNIT/L (ref 40–150)
ALT SERPL-CCNC: 38 UNIT/L (ref 0–55)
AMPHET UR QL SCN: NEGATIVE
APPEARANCE UR: CLEAR
AST SERPL-CCNC: 24 UNIT/L (ref 5–34)
BACTERIA #/AREA URNS AUTO: ABNORMAL /HPF
BARBITURATE SCN PRESENT UR: NEGATIVE
BASOPHILS # BLD AUTO: 0.03 X10(3)/MCL
BASOPHILS NFR BLD AUTO: 0.4 %
BENZODIAZ UR QL SCN: NEGATIVE
BILIRUB UR QL STRIP.AUTO: NEGATIVE
BILIRUBIN DIRECT+TOT PNL SERPL-MCNC: 0.4 MG/DL
BUN SERPL-MCNC: 8.3 MG/DL (ref 8.9–20.6)
CALCIUM SERPL-MCNC: 9.4 MG/DL (ref 8.4–10.2)
CANNABINOIDS UR QL SCN: NEGATIVE
CHLORIDE SERPL-SCNC: 102 MMOL/L (ref 98–107)
CO2 SERPL-SCNC: 24 MMOL/L (ref 22–29)
COCAINE UR QL SCN: NEGATIVE
COLOR UR: ABNORMAL
CREAT SERPL-MCNC: 0.82 MG/DL (ref 0.73–1.18)
EOSINOPHIL # BLD AUTO: 0.15 X10(3)/MCL (ref 0–0.9)
EOSINOPHIL NFR BLD AUTO: 2 %
ERYTHROCYTE [DISTWIDTH] IN BLOOD BY AUTOMATED COUNT: 12.9 % (ref 11.5–17)
FENTANYL UR QL SCN: NEGATIVE
GFR SERPLBLD CREATININE-BSD FMLA CKD-EPI: >60 MLS/MIN/1.73/M2
GLOBULIN SER-MCNC: 4.4 GM/DL (ref 2.4–3.5)
GLUCOSE SERPL-MCNC: 237 MG/DL (ref 74–100)
GLUCOSE UR QL STRIP.AUTO: 250
HCT VFR BLD AUTO: 38.8 % (ref 42–52)
HGB BLD-MCNC: 13.2 G/DL (ref 14–18)
IMM GRANULOCYTES # BLD AUTO: 0.02 X10(3)/MCL (ref 0–0.04)
IMM GRANULOCYTES NFR BLD AUTO: 0.3 %
KETONES UR QL STRIP.AUTO: NEGATIVE
LEUKOCYTE ESTERASE UR QL STRIP.AUTO: NEGATIVE
LIPASE SERPL-CCNC: 23 U/L
LYMPHOCYTES # BLD AUTO: 2.53 X10(3)/MCL (ref 0.6–4.6)
LYMPHOCYTES NFR BLD AUTO: 33.3 %
MCH RBC QN AUTO: 30.1 PG (ref 27–31)
MCHC RBC AUTO-ENTMCNC: 34 G/DL (ref 33–36)
MCV RBC AUTO: 88.4 FL (ref 80–94)
MDMA UR QL SCN: NEGATIVE
MONOCYTES # BLD AUTO: 0.68 X10(3)/MCL (ref 0.1–1.3)
MONOCYTES NFR BLD AUTO: 8.9 %
NEUTROPHILS # BLD AUTO: 4.19 X10(3)/MCL (ref 2.1–9.2)
NEUTROPHILS NFR BLD AUTO: 55.1 %
NITRITE UR QL STRIP.AUTO: NEGATIVE
NRBC BLD AUTO-RTO: 0 %
OPIATES UR QL SCN: NEGATIVE
PCP UR QL: NEGATIVE
PH UR STRIP.AUTO: 6 [PH]
PH UR: 6 [PH] (ref 3–11)
PLATELET # BLD AUTO: 305 X10(3)/MCL (ref 130–400)
PMV BLD AUTO: 10.1 FL (ref 7.4–10.4)
POTASSIUM SERPL-SCNC: 4.3 MMOL/L (ref 3.5–5.1)
PROT SERPL-MCNC: 7.8 GM/DL (ref 6.4–8.3)
PROT UR QL STRIP.AUTO: ABNORMAL
RBC # BLD AUTO: 4.39 X10(6)/MCL (ref 4.7–6.1)
RBC #/AREA URNS AUTO: ABNORMAL /HPF
RBC UR QL AUTO: ABNORMAL
SODIUM SERPL-SCNC: 135 MMOL/L (ref 136–145)
SP GR UR STRIP.AUTO: 1.02
SQUAMOUS #/AREA URNS AUTO: ABNORMAL /HPF
TROPONIN I SERPL-MCNC: <0.01 NG/ML (ref 0–0.04)
UROBILINOGEN UR STRIP-ACNC: 0.2
WBC # SPEC AUTO: 7.6 X10(3)/MCL (ref 4.5–11.5)
WBC #/AREA URNS AUTO: ABNORMAL /HPF

## 2023-07-25 PROCEDURE — 80307 DRUG TEST PRSMV CHEM ANLYZR: CPT | Performed by: EMERGENCY MEDICINE

## 2023-07-25 PROCEDURE — 83690 ASSAY OF LIPASE: CPT | Performed by: EMERGENCY MEDICINE

## 2023-07-25 PROCEDURE — 99284 EMERGENCY DEPT VISIT MOD MDM: CPT | Mod: 25

## 2023-07-25 PROCEDURE — 96374 THER/PROPH/DIAG INJ IV PUSH: CPT

## 2023-07-25 PROCEDURE — 85025 COMPLETE CBC W/AUTO DIFF WBC: CPT | Performed by: EMERGENCY MEDICINE

## 2023-07-25 PROCEDURE — 96361 HYDRATE IV INFUSION ADD-ON: CPT

## 2023-07-25 PROCEDURE — 81001 URINALYSIS AUTO W/SCOPE: CPT | Mod: 59 | Performed by: EMERGENCY MEDICINE

## 2023-07-25 PROCEDURE — 63600175 PHARM REV CODE 636 W HCPCS: Performed by: EMERGENCY MEDICINE

## 2023-07-25 PROCEDURE — 80053 COMPREHEN METABOLIC PANEL: CPT | Performed by: EMERGENCY MEDICINE

## 2023-07-25 PROCEDURE — 84484 ASSAY OF TROPONIN QUANT: CPT | Performed by: EMERGENCY MEDICINE

## 2023-07-25 RX ORDER — ONDANSETRON 8 MG/1
8 TABLET, ORALLY DISINTEGRATING ORAL EVERY 6 HOURS PRN
Qty: 20 TABLET | Refills: 0 | Status: SHIPPED | OUTPATIENT
Start: 2023-07-25 | End: 2023-07-30

## 2023-07-25 RX ORDER — SUCRALFATE 1 G/1
1 TABLET ORAL
Qty: 56 TABLET | Refills: 0 | Status: SHIPPED | OUTPATIENT
Start: 2023-07-25 | End: 2023-08-08

## 2023-07-25 RX ORDER — DICYCLOMINE HYDROCHLORIDE 20 MG/1
20 TABLET ORAL 4 TIMES DAILY
Qty: 40 TABLET | Refills: 0 | Status: SHIPPED | OUTPATIENT
Start: 2023-07-25 | End: 2023-08-04

## 2023-07-25 RX ORDER — ONDANSETRON 2 MG/ML
4 INJECTION INTRAMUSCULAR; INTRAVENOUS
Status: COMPLETED | OUTPATIENT
Start: 2023-07-25 | End: 2023-07-25

## 2023-07-25 RX ADMIN — ONDANSETRON 4 MG: 2 INJECTION INTRAMUSCULAR; INTRAVENOUS at 09:07

## 2023-07-25 RX ADMIN — SODIUM CHLORIDE, POTASSIUM CHLORIDE, SODIUM LACTATE AND CALCIUM CHLORIDE 1000 ML: 600; 310; 30; 20 INJECTION, SOLUTION INTRAVENOUS at 09:07

## 2023-07-25 NOTE — ED TRIAGE NOTES
Pt complaint of n/v since last night and diarrhea x1 with abd pain. Pt relates tht he was out of Rx med for  gastritis, but was able to refill it lastngiht.

## 2023-07-25 NOTE — Clinical Note
"Rick Haywood (Jeremy)ard was seen and treated in our emergency department on 7/25/2023.  He may return to work on 07/26/2023.       If you have any questions or concerns, please don't hesitate to call.      Michael HINTON RN    "

## 2023-07-25 NOTE — ED PROVIDER NOTES
Encounter Date: 7/25/2023       History     Chief Complaint   Patient presents with    Vomiting     Pt complaint of n/v since last night and diarrhea x1 with abd pain.     The history is provided by the patient.   Emesis   This is a recurrent problem. The current episode started two days ago. The problem has been unchanged. The emesis has an appearance of bilious material. Pertinent negatives include no fever.   Seen at [facility] ED x 2 last month for same symptoms.  Symptoms improved when he started Protonix and Carafate.  Ran out of meds and symptoms returned.  States he was told he was being referred to GI but he has not heard from the clinic.  Saw PCP last week and no mention of GI symptoms then.    Review of patient's allergies indicates:  No Known Allergies  Past Medical History:   Diagnosis Date    Diabetes mellitus     Hypertension      No past surgical history on file.  Family History   Problem Relation Age of Onset    Diabetes Mother      Social History     Tobacco Use    Smoking status: Never     Passive exposure: Never    Smokeless tobacco: Never   Substance Use Topics    Alcohol use: Never    Drug use: Never     Review of Systems   Constitutional:  Negative for fever.   HENT:  Negative for sore throat.    Respiratory:  Negative for shortness of breath.    Cardiovascular:  Negative for chest pain.   Gastrointestinal:  Positive for vomiting. Negative for nausea.   Genitourinary:  Negative for dysuria.   Musculoskeletal:  Negative for back pain.   Skin:  Negative for rash.   Neurological:  Negative for weakness.   Hematological:  Does not bruise/bleed easily.     Physical Exam     Initial Vitals [07/25/23 0840]   BP Pulse Resp Temp SpO2   (!) 133/99 93 18 98.9 °F (37.2 °C) 99 %      MAP       --         Physical Exam    Nursing note and vitals reviewed.  Constitutional: He appears well-developed and well-nourished.   HENT:   Head: Normocephalic and atraumatic.   Right Ear: External ear normal.   Left Ear:  External ear normal.   Nose: Nose normal.   Eyes: Conjunctivae and EOM are normal. Pupils are equal, round, and reactive to light.   Neck: Neck supple.   Normal range of motion.  Cardiovascular:  Normal rate, regular rhythm, normal heart sounds and intact distal pulses.           Pulmonary/Chest: Breath sounds normal.   Abdominal: Abdomen is soft. Bowel sounds are normal.   obese   Musculoskeletal:         General: Normal range of motion.      Cervical back: Normal range of motion and neck supple.     Neurological: He is alert and oriented to person, place, and time. He has normal strength. GCS score is 15. GCS eye subscore is 4. GCS verbal subscore is 5. GCS motor subscore is 6.   Skin: Skin is warm and dry. Capillary refill takes less than 2 seconds.   Psychiatric: He has a normal mood and affect. His behavior is normal. Judgment and thought content normal.       ED Course   Procedures  Labs Reviewed   COMPREHENSIVE METABOLIC PANEL - Abnormal; Notable for the following components:       Result Value    Sodium Level 135 (*)     Glucose Level 237 (*)     Blood Urea Nitrogen 8.3 (*)     Albumin Level 3.4 (*)     Globulin 4.4 (*)     Albumin/Globulin Ratio 0.8 (*)     All other components within normal limits   URINALYSIS, REFLEX TO URINE CULTURE - Abnormal; Notable for the following components:    Protein, UA Trace (*)     Glucose,  (*)     Blood, UA Trace (*)     All other components within normal limits   CBC WITH DIFFERENTIAL - Abnormal; Notable for the following components:    RBC 4.39 (*)     Hgb 13.2 (*)     Hct 38.8 (*)     All other components within normal limits   URINALYSIS, MICROSCOPIC - Abnormal; Notable for the following components:    Squamous Epithelial Cells, UA Few (*)     All other components within normal limits   LIPASE - Normal   TROPONIN I - Normal   DRUG SCREEN, URINE (BEAKER) - Normal    Narrative:     Cut off concentrations:    Amphetamines - 1000 ng/ml  Barbiturates - 200  ng/ml  Benzodiazepine - 200 ng/ml  Cannabinoids (THC) - 50 ng/ml  Cocaine - 300 ng/ml  Fentanyl - 1.0 ng/ml  MDMA - 500 ng/ml  Opiates - 300 ng/ml   Phencyclidine (PCP) - 25 ng/ml    Specimen submitted for drug analysis and tested for pH and specific gravity in order to evaluate sample integrity. Suspect tampering if specific gravity is <1.003 and/or pH is not within the range of 4.5 - 8.0  False negatives may result form substances such as bleach added to urine.  False positives may result for the presence of a substance with similar chemical structure to the drug or its metabolite.    This test provides only a PRELIMINARY analytical test result. A more specific alternate chemical method must be used in order to obtain a confirmed analytical result. Gas chromatography/mass spectrometry (GC/MS) is the preferred confirmatory method. Other chemical confirmation methods are available. Clinical consideration and professional judgement should be applied to any drug of abuse test result, particularly when preliminary positive results are used.    Positive results will be confirmed only at the physicians request. Unconfirmed screening results are to be used only for medical purposes (treatment).        CBC W/ AUTO DIFFERENTIAL    Narrative:     The following orders were created for panel order CBC auto differential.  Procedure                               Abnormality         Status                     ---------                               -----------         ------                     CBC with Differential[[id number]]        Abnormal            Final result                 Please view results for these tests on the individual orders.          Imaging Results    None          Medications   lactated ringers bolus 1,000 mL (0 mLs Intravenous Stopped 7/25/23 1023)   ondansetron injection 4 mg (4 mg Intravenous Given 7/25/23 7095)                       Differential includes: gastritis, PUD, GERD, gastroparesis, pancreatitis,  viral GE, colitis, GB disease, angina, cannabis-hyperemesis syndrome.  Will obtain CBC, CMP, lipase, troponin, UA, UDS and give IVF and antiemetic.       Clinical Impression:   Final diagnoses:  [R11.2] Nausea and vomiting, unspecified vomiting type (Primary)        ED Disposition Condition    Discharge Stable          ED Prescriptions       Medication Sig Dispense Start Date End Date Auth. Provider    sucralfate (CARAFATE) 1 gram tablet Take 1 tablet (1 g total) by mouth 4 (four) times daily before meals and nightly. for 14 days 56 tablet 7/25/2023 8/8/2023 Art Olson MD    ondansetron (ZOFRAN-ODT) 8 MG TbDL Take 1 tablet (8 mg total) by mouth every 6 (six) hours as needed (nausea). 20 tablet 7/25/2023 7/30/2023 Art Olson MD    dicyclomine (BENTYL) 20 mg tablet Take 1 tablet (20 mg total) by mouth 4 (four) times daily. for 10 days 40 tablet 7/25/2023 8/4/2023 Art Olson MD          Follow-up Information       Follow up With Specialties Details Why Contact Info    The GI clinic will contact you with  a follow up appointment.                 Art Olson MD  07/25/23 2094

## 2023-07-25 NOTE — TELEPHONE ENCOUNTER
Pt left  to call him back. Pt call was returned, a female answered, I stated [facility] for Mr Ivonne and she disconnected the call.

## 2023-08-03 DIAGNOSIS — E11.69 TYPE 2 DIABETES MELLITUS WITH OTHER SPECIFIED COMPLICATION, WITH LONG-TERM CURRENT USE OF INSULIN: Primary | ICD-10-CM

## 2023-08-03 DIAGNOSIS — Z79.4 TYPE 2 DIABETES MELLITUS WITH OTHER SPECIFIED COMPLICATION, WITH LONG-TERM CURRENT USE OF INSULIN: Primary | ICD-10-CM

## 2023-08-07 RX ORDER — INSULIN ASPART 100 [IU]/ML
15 INJECTION, SUSPENSION SUBCUTANEOUS
Qty: 27 ML | Refills: 3 | Status: SHIPPED | OUTPATIENT
Start: 2023-08-07 | End: 2023-09-28 | Stop reason: SDUPTHER

## 2023-09-27 NOTE — PROGRESS NOTES
"  [facility]  [address] [facility] [address]      PATIENT NAME: Rick Coronado  : 1985  DATE: 23  MRN: [id number]      Billing Provider: Davey Reyes MD  Level of Service:   Patient PCP Information       Provider PCP Type    Davey Reyes MD General            Reason for Visit / Chief Complaint: Follow-up (Routine follow up visit. Need refills on Rxs. )       History of Present Illness / Problem Focused Workflow     Rick Coronado presents to the clinic with Follow-up (Routine follow up visit. Need refills on Rxs. )       Patient was last seen on .  He has a history of uncontrolled diabetes.     Currently on metformin 1000 mg bid, victoza 1.8 mg, and 70/30 22 units bid on sliding scale.    However, states that vicotza has been causing him to have severe nausea and vomiting. Unable to tolerate the victoza.     PMH: diabetes, htn, eczema, gerd, obesity  Allergies: nkda  Surgeries: none  Family: Mother passsed away from COVID, diabetes. father is healthy. Sister-"beat cancer"  Social: neg times 3. Works [employer].    Review of Systems     10-point ROS performed and negative except as above.      Medical / Social / Family History     Past Medical History:   Diagnosis Date    Diabetes mellitus     Hypertension        History reviewed. No pertinent surgical history.    Social History    reports that he has never smoked. He has never been exposed to tobacco smoke. He has never used smokeless tobacco. He reports that he does not drink alcohol and does not use drugs.    Family History  's family history includes Diabetes in his mother.    Medications and Allergies     Medications  Outpatient Medications Marked as Taking for the 23 encounter (Office Visit) with Davey Reyes MD   Medication Sig Dispense Refill    BD INSULIN SYRINGE ULTRA-FINE 0.5 mL 30 gauge x 1/2" Syrg USE TO INJECT LANTUS AT " "BEDTIME DAILY 90 each 0    BD ELSA 2ND GEN PEN NEEDLE 32 gauge x 5/32" Ndle USE WITH LANTUS DAILY 100 each 3    blood sugar diagnostic, drum (ACCU-CHEK COMPACT TEST) Strp 100 each by Misc.(Non-Drug; Combo Route) route 2 (two) times a day. 100 each 2    blood-glucose meter, drum-type Kit 1 each by Misc.(Non-Drug; Combo Route) route 2 (two) times a day. 1 kit 0    flash glucose sensor (FREESTYLE SULY 14 DAY SENSOR) Kit 1 Units by Misc.(Non-Drug; Combo Route) route continuous. 1 kit 0    insulin aspart protamine-insulin aspart (NOVOLOG MIX 70-30FLEXPEN U-100) 100 unit/mL (70-30) InPn pen Inject 15 Units into the skin 2 (two) times daily before meals. 27 mL 3    lancing device (ACCU-CHEK SOFTCLIX LANCET DEV) Misc 100 each by Misc.(Non-Drug; Combo Route) route 2 (two) times a day. 100 each 1    pen needle,diabetic dual safty (BD AUTOSHIELD DUO PEN NEEDLE) 30 gauge x 3/16" Ndle Use for diabetes 90 each 0    rosuvastatin (CRESTOR) 10 MG tablet TAKE 1 TABLET BY MOUTH EVERY DAY AT BEDTIME 30 tablet 3    tiZANidine (ZANAFLEX) 4 MG tablet Take 8 mg by mouth.      triamcinolone acetonide 0.025% (KENALOG) 0.025 % Oint Apply topically.      TRUE METRIX GLUCOSE TEST STRIP Strp USE TO TEST THREE TIMES DAILY 100 strip 5    TRUE METRIX GLUCOSE TEST STRIP Strp USE TO TEST THREE TIMES DAILY      TRUEPLUS LANCETS 33 gauge Misc USE TO TEST THREE TIMES DAILY 100 each 5    TRUEPLUS LANCETS 33 gauge Misc USE TO TEST THREE TIMES DAILY 100 each 5       Allergies  Review of patient's allergies indicates:  No Known Allergies    Physical Examination     Vitals:    09/28/23 1504   BP: 130/81   Pulse: 89   Resp: 20   Temp: 97.9 °F (36.6 °C)     General: AAOx3, NAD, alert and cooperative  HEENT: EOMI, normal conjunctiva  Neck: no LAD, no JVD, supple, no masses or thyromegaly  CVS: S1/S2 nml, RRR, no murmurs, rubs or gallops,Resp: CTA B/L, no rhonchi, rales, or wheezing  GI: no TTP, not distended, BS+  : no CVA tenderness  Skin: not jaundiced, " "warm, no rashes  Musculoskeletal: normal ROM, no joint tenderness, normal muscular development  Extremities: no peripheral edema, peripheral pulses intact  Neuro: CN II-XII grossly intact, strength and sensation symmetric and intact throughout, no focal neurological deficits        Results   Reviewed recent labs.        Assessment and Plan (including Health Maintenance)     Plan:   Rick was seen today for follow-up.    Diagnoses and all orders for this visit:    Type 2 diabetes mellitus with other specified complication, with long-term current use of insulin  The following orders have not been finalized:  -     BD ELSA 2ND GEN PEN NEEDLE 32 gauge x 5/32" Ndle  -     insulin aspart protamine-insulin aspart (NOVOLOG MIX 70-30FLEXPEN U-100) 100 unit/mL (70-30) InPn pen  -     metFORMIN (GLUCOPHAGE) 1000 MG tablet    Other orders  The following orders have not been finalized:  -     ondansetron (ZOFRAN-ODT) 4 MG TbDL  -     BD INSULIN SYRINGE ULTRA-FINE 0.5 mL 30 gauge x 1/2" Syrg  -     losartan (COZAAR) 25 MG tablet  -     empagliflozin (JARDIANCE) 10 mg tablet        Uncontrolled diabetes mellitus E11.65  POC A1c on 6/8/23 was 12.8  POC A1c today is 14.9  Currently on metformin 1000 mg bid, victoza 1.8 mg, and 70/30 22 units bid on sliding scale.  Will stop victoza.  Will start jardiance 10 mg.   Does not check sugars regularly.  Advised to increase by another 2 units every 5 days until glucose is less than 120.  Refilled crestor 10 mg at bedtime.  Follow ADA diet.  Avoid soda, simple sweets, and limit rice/pasta/bread/starches and consume brown options when possible.   Will refer to nutritional services.  Has seen diabetic education.  Maintain healthy weight with BMI goal <30.   Perform aerobic exercise for 150 minutes per week (or 5 days a week for 30 minutes each day).   Examine and wash feet daily.   Eye exam: seeing eye doctor. Going again in November.  Foot exam: done on 3/2/23  Missed endocrine appt. Will " ask patient to call and reschedule  Referred to dietician, nutritional services, and endocrine    3. Morbid obesity with BMI of 45.0-49.9, adult E66.01  BMI 45 Goal BMI <30.  Aerobic exercise 150 minutes per week.  Avoid soda, simple sugars, sweets, excessive rice, pasta, potatoes or bread.   Choose brown options when available and portion control.  Limit fast foods and fried foods.   Choose complex carbs in moderation (ex: green, leafy vegetables, beans, oatmeal).  Eat vegetables with lean meats daily.   Consider permanent healthy lifestyle changes.  Unable to tolerate victoza    4. HTN (hypertension) I10  BP today 130/81  Continue losartan to 12.5 mg qd  Follow a low sodium (less than 2 grams of sodium per day), DASH diet.   Continue medications as prescribed.  Monitor blood pressure and report any consistent values greater than 140/90 and keep a log.  Maintain healthy weight with a BMI goal of <30.   Aerobic exercise for 150 minutes per week (or 5 days a week for 30 minutes each day).    5. GERD (gastroesophageal reflux disease) K21.9  Continue PPI  Avoid spicy, acidic, fried food and alcohol.   Eat 2-3 hours before bed.  Avoid tight fitting clothes and chew food thoroughly.   Reduce caffeine intake, avoid soda.   Take meds as prescribed.     6. Depression  No SI/HI  Will start zoloft 50 mg.     7. Tinea Pedis  Will give clotrimazole cream bid  Doing better    Routine labs.  F/u in 2 month    Health Maintenance Due   Topic Date Due    Hepatitis C Screening  Never done    COVID-19 Vaccine (1) Never done    HIV Screening  Never done    Diabetes Urine Screening  09/28/2021    Lipid Panel  08/05/2023    Influenza Vaccine (1) Never done    Hemoglobin A1c  09/08/2023         Health Maintenance Topics with due status: Not Due       Topic Last Completion Date    TETANUS VACCINE 01/11/2019    Foot Exam 03/02/2023    Eye Exam 03/02/2023       No follow-ups on file.     Future Appointments   Date Time Provider Department  [place]   11/9/2023  3:00 PM Davey Reyes MD [facility]              Signature:  Davey Reyes MD  [facility]  [address] [facility] [address]    Date of encounter: 9/28/23

## 2023-09-28 ENCOUNTER — OFFICE VISIT (OUTPATIENT)
Dept: INTERNAL MEDICINE | Facility: CLINIC | Age: 38
End: 2023-09-28
Payer: MEDICAID

## 2023-09-28 VITALS
RESPIRATION RATE: 20 BRPM | HEART RATE: 89 BPM | WEIGHT: 305 LBS | DIASTOLIC BLOOD PRESSURE: 81 MMHG | HEIGHT: 69 IN | OXYGEN SATURATION: 98 % | TEMPERATURE: 98 F | BODY MASS INDEX: 45.18 KG/M2 | SYSTOLIC BLOOD PRESSURE: 130 MMHG

## 2023-09-28 DIAGNOSIS — Z79.4 TYPE 2 DIABETES MELLITUS WITH OTHER SPECIFIED COMPLICATION, WITH LONG-TERM CURRENT USE OF INSULIN: ICD-10-CM

## 2023-09-28 DIAGNOSIS — E11.69 TYPE 2 DIABETES MELLITUS WITH OTHER SPECIFIED COMPLICATION, WITH LONG-TERM CURRENT USE OF INSULIN: ICD-10-CM

## 2023-09-28 DIAGNOSIS — Z00.00 WELLNESS EXAMINATION: Primary | ICD-10-CM

## 2023-09-28 LAB — HBA1C MFR BLD: 14.3 %

## 2023-09-28 PROCEDURE — 83036 HEMOGLOBIN GLYCOSYLATED A1C: CPT | Mod: PBBFAC | Performed by: INTERNAL MEDICINE

## 2023-09-28 PROCEDURE — 99215 OFFICE O/P EST HI 40 MIN: CPT | Mod: PBBFAC | Performed by: INTERNAL MEDICINE

## 2023-09-28 RX ORDER — METFORMIN HYDROCHLORIDE 1000 MG/1
1000 TABLET ORAL 2 TIMES DAILY
Qty: 60 TABLET | Refills: 1 | Status: SHIPPED | OUTPATIENT
Start: 2023-09-28 | End: 2024-01-31 | Stop reason: SDUPTHER

## 2023-09-28 RX ORDER — LOSARTAN POTASSIUM 25 MG/1
25 TABLET ORAL DAILY
Qty: 90 TABLET | Refills: 1 | Status: SHIPPED | OUTPATIENT
Start: 2023-09-28 | End: 2024-03-26

## 2023-09-28 RX ORDER — PEN NEEDLE, DIABETIC 29 G X1/2"
NEEDLE, DISPOSABLE MISCELLANEOUS
Qty: 90 EACH | Refills: 0 | Status: SHIPPED | OUTPATIENT
Start: 2023-09-28

## 2023-09-28 RX ORDER — ONDANSETRON 4 MG/1
4 TABLET, ORALLY DISINTEGRATING ORAL EVERY 12 HOURS PRN
Qty: 60 TABLET | Refills: 0 | Status: SHIPPED | OUTPATIENT
Start: 2023-09-28 | End: 2023-10-28

## 2023-09-28 RX ORDER — INSULIN ASPART 100 [IU]/ML
15 INJECTION, SUSPENSION SUBCUTANEOUS
Qty: 27 ML | Refills: 3 | Status: SHIPPED | OUTPATIENT
Start: 2023-09-28 | End: 2024-09-27

## 2023-09-28 RX ORDER — PEN NEEDLE, DIABETIC 32GX 5/32"
NEEDLE, DISPOSABLE MISCELLANEOUS
Qty: 100 EACH | Refills: 3 | Status: SHIPPED | OUTPATIENT
Start: 2023-09-28

## 2023-10-28 ENCOUNTER — OFFICE VISIT (OUTPATIENT)
Dept: URGENT CARE | Facility: CLINIC | Age: 38
End: 2023-10-28
Payer: MEDICAID

## 2023-10-28 VITALS
RESPIRATION RATE: 20 BRPM | BODY MASS INDEX: 43.52 KG/M2 | HEIGHT: 70 IN | HEART RATE: 96 BPM | TEMPERATURE: 99 F | WEIGHT: 304 LBS | OXYGEN SATURATION: 97 % | SYSTOLIC BLOOD PRESSURE: 131 MMHG | DIASTOLIC BLOOD PRESSURE: 84 MMHG

## 2023-10-28 DIAGNOSIS — Z20.822 CLOSE EXPOSURE TO COVID-19 VIRUS: ICD-10-CM

## 2023-10-28 DIAGNOSIS — U07.1 COVID-19 VIRUS DETECTED: ICD-10-CM

## 2023-10-28 DIAGNOSIS — U07.1 COVID-19: Primary | ICD-10-CM

## 2023-10-28 LAB
CTP QC/QA: YES
SARS-COV-2 RDRP RESP QL NAA+PROBE: POSITIVE

## 2023-10-28 PROCEDURE — 99214 OFFICE O/P EST MOD 30 MIN: CPT | Mod: S$PBB,,, | Performed by: FAMILY MEDICINE

## 2023-10-28 PROCEDURE — 99214 PR OFFICE/OUTPT VISIT, EST, LEVL IV, 30-39 MIN: ICD-10-PCS | Mod: S$PBB,,, | Performed by: FAMILY MEDICINE

## 2023-10-28 PROCEDURE — 99215 OFFICE O/P EST HI 40 MIN: CPT | Mod: PBBFAC

## 2023-10-28 PROCEDURE — 87635 SARS-COV-2 COVID-19 AMP PRB: CPT | Mod: PBBFAC

## 2023-10-28 RX ORDER — BLOOD-GLUCOSE METER
EACH MISCELLANEOUS
COMMUNITY
Start: 2023-06-09

## 2023-10-28 RX ORDER — INSULIN GLARGINE 100 [IU]/ML
INJECTION, SOLUTION SUBCUTANEOUS
COMMUNITY
Start: 2023-10-04 | End: 2023-10-28 | Stop reason: ALTCHOICE

## 2023-10-28 NOTE — PROGRESS NOTES
"Subjective:       Patient ID: Rick Coronado is a 38 y.o. male.    Chief Complaint: Covid Exposure (Patient in close contact with Covid. Headache, runny nose.)      HPI  38-year-old male with headache and runny nose for 1 day.  Wife tested positive for COVID this morning.  No other current symptoms.  Review of Systems   HENT:          As above   Neurological:         As above         Objective:       Vital Signs  Temp: 98.8 °F (37.1 °C)  Temp Source: Oral  Pulse: 96  Resp: 20  SpO2: 97 %  BP: 131/84  Pain Score: 0-No pain  Height and Weight  Height: 5' 9.5" (176.5 cm)  Weight: (!) 137.9 kg (304 lb)  BSA (Calculated - sq m): 2.6 sq meters  BMI (Calculated): 44.3  Weight in (lb) to have BMI = 25: 171.4]  Physical Exam  Vitals reviewed.   Constitutional:       Appearance: Normal appearance.   HENT:      Head: Normocephalic and atraumatic.   Eyes:      Extraocular Movements: Extraocular movements intact.      Conjunctiva/sclera: Conjunctivae normal.   Cardiovascular:      Rate and Rhythm: Normal rate and regular rhythm.      Heart sounds: Normal heart sounds.   Pulmonary:      Breath sounds: Normal breath sounds.   Skin:     General: Skin is warm and dry.   Neurological:      General: No focal deficit present.      Mental Status: He is alert.   Psychiatric:         Mood and Affect: Mood normal.         Behavior: Behavior normal.         Assessment:       Problem List Items Addressed This Visit    None  Visit Diagnoses       COVID-19    -  Primary    Close exposure to COVID-19 virus        Relevant Orders    POCT COVID-19 Rapid Screening (Completed)            Plan:   Encouraged antihistamines as needed  Encouraged ibuprofen/acetaminophen as needed  ER precautions  Follow-up with PCP      "

## 2023-10-28 NOTE — LETTER
October 28, 2023      [facility]  [address] [facility] [address]  Phone: [phone]       Patient: Rick Coronado   YOB: 1985  Date of Visit: 10/28/2023    To Whom It May Concern:    Hoa Coronado  was at [facility] on 10/28/2023. The patient may return to work/school on 11/03/2023 with no restrictions. If you have any questions or concerns, or if I can be of further assistance, please do not hesitate to contact me.    Sincerely,      Stuart Galvan MD

## 2023-11-07 NOTE — PROGRESS NOTES
[facility]  [address] [facility] [address]      PATIENT NAME: Rick Coronado  : 1985  DATE: 23  MRN: [id number]      Billing Provider: Davey Reyes MD  Level of Service:   Patient PCP Information       Provider PCP Type    Davey Reyes MD General            Reason for Visit / Chief Complaint: Follow-up (Routine follow up visit. Recovering from COVID. )       History of Present Illness / Problem Focused Workflow     Rick Coronado presents to the clinic with Follow-up (Routine follow up visit. Recovering from COVID. )     Patient is a 38-year-old male, last seen by me on .  He has a history of uncontrolled diabetes.    POC A1c during last clinic appointment was 14.9.  Currently on metformin 1000 mg b.i.d., Jardiance 10 mg daily, and 70/30 22 units b.i.d. on a sliding scale.  At the last visit, I advised the patient to increase his 70 30 x 2 units every 5 days until his glucoses less than 120.    States that he could not tolerate the jardiance. Feels nauseated and throws up.  States he still takes metformin.   Also takes 70/30 26 units in AM and PM.   States that fasting sugars are in the 150s and 160s.    Today, his POC A1c is 14.3. States that he had covid a few weeks ago.     Review of Systems     10-point ROS performed and negative except as above.      Medical / Social / Family History     Past Medical History:   Diagnosis Date    Diabetes mellitus     Hyperlipidemia     Hypertension        History reviewed. No pertinent surgical history.    Social History    reports that he has never smoked. He has never been exposed to tobacco smoke. He has never used smokeless tobacco. He reports that he does not drink alcohol and does not use drugs.    Family History  's family history includes Diabetes in his mother.    Medications and Allergies     Medications  Outpatient Medications Marked as Taking for  "the 11/9/23 encounter (Office Visit) with Davey Reyes MD   Medication Sig Dispense Refill    BD INSULIN SYRINGE ULTRA-FINE 0.5 mL 30 gauge x 1/2" Syrg USE TO INJECT LANTUS AT BEDTIME DAILY 90 each 0    BD ELSA 2ND GEN PEN NEEDLE 32 gauge x 5/32" Ndle USE WITH LANTUS DAILY 100 each 3    blood sugar diagnostic, drum (ACCU-CHEK COMPACT TEST) Strp 100 each by Misc.(Non-Drug; Combo Route) route 2 (two) times a day. 100 each 2    blood-glucose meter, drum-type Kit 1 each by Misc.(Non-Drug; Combo Route) route 2 (two) times a day. 1 kit 0    famotidine (PEPCID) 20 MG tablet Take 20 mg by mouth 2 (two) times daily.      flash glucose sensor (FREESTYLE SULY 14 DAY SENSOR) Kit 1 Units by Misc.(Non-Drug; Combo Route) route continuous. 1 kit 0    insulin aspart protamine-insulin aspart (NOVOLOG MIX 70-30FLEXPEN U-100) 100 unit/mL (70-30) InPn pen Inject 15 Units into the skin 2 (two) times daily before meals. 27 mL 3    lancing device (ACCU-CHEK SOFTCLIX LANCET DEV) Misc 100 each by Misc.(Non-Drug; Combo Route) route 2 (two) times a day. 100 each 1    losartan (COZAAR) 25 MG tablet Take 1 tablet (25 mg total) by mouth once daily. 90 tablet 1    metFORMIN (GLUCOPHAGE) 1000 MG tablet Take 1 tablet (1,000 mg total) by mouth 2 (two) times daily. 60 tablet 1    pen needle,diabetic dual safty (BD AUTOSHIELD DUO PEN NEEDLE) 30 gauge x 3/16" Ndle Use for diabetes 90 each 0    rosuvastatin (CRESTOR) 10 MG tablet TAKE 1 TABLET BY MOUTH EVERY DAY AT BEDTIME 30 tablet 3    tiZANidine (ZANAFLEX) 4 MG tablet Take 8 mg by mouth.      triamcinolone acetonide 0.025% (KENALOG) 0.025 % Oint Apply topically.      TRUE METRIX GLUCOSE METER Misc USE AS DIRECTED TWICE DAILY      TRUE METRIX GLUCOSE TEST STRIP Strp USE TO TEST THREE TIMES DAILY 100 strip 5    TRUE METRIX GLUCOSE TEST STRIP Strp USE TO TEST THREE TIMES DAILY      TRUEPLUS LANCETS 33 gauge Misc USE TO TEST THREE TIMES DAILY 100 each 5    TRUEPLUS LANCETS 33 gauge Misc USE TO " TEST THREE TIMES DAILY 100 each 5       Allergies  Review of patient's allergies indicates:  No Known Allergies    Physical Examination     Vitals:    11/09/23 1458   BP: 121/75   Pulse: 90   Resp: 16   Temp: 97.8 °F (36.6 °C)     General: AAOx3, NAD, alert and cooperative  HEENT: PERRLA, EOMI, normal conjunctiva  Neck: no LAD, no JVD, supple, no masses or thyromegaly  CVS: S1/S2 nml, RRR, no murmurs, rubs or gallops, no carotid bruits  Resp: CTA B/L, no rhonchi, rales, or wheezing  GI: no TTP, not distended, BS+  : no CVA tenderness  Skin: not jaundiced, warm, no rashes  Musculoskeletal: normal ROM, no joint tenderness, normal muscular development  Extremities: no peripheral edema, peripheral pulses intact  Neuro: CN II-XII grossly intact, strength and sensation symmetric and intact throughout, no focal neurological deficits        Results   Reviewed recent labs.        Assessment and Plan (including Health Maintenance)     Plan:   Rick was seen today for follow-up.    Diagnoses and all orders for this visit:    Type 2 diabetes mellitus with hyperosmolarity without coma, unspecified whether long term insulin use        Uncontrolled diabetes mellitus E11.65  POC A1c on 6/8/23 was 12.8  POC A1c today is 14.3  Currently on metformin 1000 mg bid, and 70/30 26 units bid on sliding scale.  Will start pioglitazone 15 mg and mounjaro 2.5 mg  Did not tolerate victoza or jardiance  States his morning sugars are in the 150s-160s  Advised to increase by another 2 units every 5 days until glucose is less than 120.   crestor 10 mg at bedtime.  Follow ADA diet.  Avoid soda, simple sweets, and limit rice/pasta/bread/starches and consume brown options when possible.   Will refer to nutritional services.  Has seen diabetic education.  Maintain healthy weight with BMI goal <30.   Perform aerobic exercise for 150 minutes per week (or 5 days a week for 30 minutes each day).   Examine and wash feet daily.   Eye exam: seeing eye  doctor. Going again in November.  Foot exam: done on 3/2/23  Missed endocrine appt. Will ask patient to call and reschedule  Referred to dietician, nutritional services, and endocrine    3. Morbid obesity with BMI of 45.0-49.9, adult E66.01  BMI 45 Goal BMI <30.  Aerobic exercise 150 minutes per week.  Avoid soda, simple sugars, sweets, excessive rice, pasta, potatoes or bread.   Choose brown options when available and portion control.  Limit fast foods and fried foods.   Choose complex carbs in moderation (ex: green, leafy vegetables, beans, oatmeal).  Eat vegetables with lean meats daily.   Consider permanent healthy lifestyle changes.  Unable to tolerate victoza    4. HTN (hypertension) I10  BP today 121/75  Continue losartan to 12.5 mg qd  Follow a low sodium (less than 2 grams of sodium per day), DASH diet.   Continue medications as prescribed.  Monitor blood pressure and report any consistent values greater than 140/90 and keep a log.  Maintain healthy weight with a BMI goal of <30.   Aerobic exercise for 150 minutes per week (or 5 days a week for 30 minutes each day).    5. GERD (gastroesophageal reflux disease) K21.9  Continue PPI  Avoid spicy, acidic, fried food and alcohol.   Eat 2-3 hours before bed.  Avoid tight fitting clothes and chew food thoroughly.   Reduce caffeine intake, avoid soda.   Take meds as prescribed.     6. Depression  No SI/HI  Will start zoloft 50 mg.     7. Tinea Pedis  Will give clotrimazole cream bid  Doing better    Routine labs.  F/u in 3 month    Recap: continue metformin 1000 mg bid, 70/30 increase units as tolerate, adding pioglitazone and mounjaro. Needs logbood prior to next visit    Health Maintenance Due   Topic Date Due    Hepatitis C Screening  Never done    COVID-19 Vaccine (1) Never done    HIV Screening  Never done    Diabetes Urine Screening  09/28/2021    Lipid Panel  08/05/2023    Influenza Vaccine (1) Never done         Health Maintenance Topics with due status:  Not Due       Topic Last Completion Date    TETANUS VACCINE 01/11/2019    Foot Exam 03/02/2023    Eye Exam 03/02/2023    Hemoglobin A1c 09/28/2023       No follow-ups on file.     No future appointments.           Signature:  Davey Reyes MD  [facility]  [address] [facility] [address]    Date of encounter: 11/9/23

## 2023-11-09 ENCOUNTER — OFFICE VISIT (OUTPATIENT)
Dept: INTERNAL MEDICINE | Facility: CLINIC | Age: 38
End: 2023-11-09
Payer: MEDICAID

## 2023-11-09 VITALS
WEIGHT: 303 LBS | SYSTOLIC BLOOD PRESSURE: 121 MMHG | TEMPERATURE: 98 F | BODY MASS INDEX: 42.42 KG/M2 | HEIGHT: 71 IN | OXYGEN SATURATION: 99 % | DIASTOLIC BLOOD PRESSURE: 75 MMHG | RESPIRATION RATE: 16 BRPM | HEART RATE: 90 BPM

## 2023-11-09 DIAGNOSIS — E11.00 TYPE 2 DIABETES MELLITUS WITH HYPEROSMOLARITY WITHOUT COMA, UNSPECIFIED WHETHER LONG TERM INSULIN USE: Primary | ICD-10-CM

## 2023-11-09 LAB — HBA1C MFR BLD: 14.3 %

## 2023-11-09 PROCEDURE — 99215 OFFICE O/P EST HI 40 MIN: CPT | Mod: PBBFAC | Performed by: INTERNAL MEDICINE

## 2023-11-09 PROCEDURE — 83036 HEMOGLOBIN GLYCOSYLATED A1C: CPT | Mod: PBBFAC | Performed by: INTERNAL MEDICINE

## 2023-11-09 RX ORDER — PIOGLITAZONEHYDROCHLORIDE 15 MG/1
15 TABLET ORAL DAILY
Qty: 90 TABLET | Refills: 3 | Status: SHIPPED | OUTPATIENT
Start: 2023-11-09 | End: 2024-11-08

## 2023-11-09 RX ORDER — ONDANSETRON 4 MG/1
4 TABLET, ORALLY DISINTEGRATING ORAL EVERY 8 HOURS PRN
Qty: 14 TABLET | Refills: 0 | Status: SHIPPED | OUTPATIENT
Start: 2023-11-09

## 2023-11-10 ENCOUNTER — TELEPHONE (OUTPATIENT)
Dept: INTERNAL MEDICINE | Facility: CLINIC | Age: 38
End: 2023-11-10
Payer: MEDICAID

## 2023-11-10 RX ORDER — TIRZEPATIDE 2.5 MG/.5ML
2.5 INJECTION, SOLUTION SUBCUTANEOUS
Qty: 4 PEN | Refills: 0 | Status: SHIPPED | OUTPATIENT
Start: 2023-11-10 | End: 2023-12-02

## 2023-11-10 NOTE — TELEPHONE ENCOUNTER
Spoke to patient over the phone and gave him the message from the provider. Pt agrees to keep blood sugar log 2 wks before his next visit [email or web address]

## 2023-11-12 ENCOUNTER — HOSPITAL ENCOUNTER (EMERGENCY)
Facility: HOSPITAL | Age: 38
Discharge: HOME | End: 2023-11-13
Attending: EMERGENCY MEDICINE
Payer: MEDICAID

## 2023-11-12 DIAGNOSIS — R07.9 CHEST PAIN, UNSPECIFIED TYPE: Primary | ICD-10-CM

## 2023-11-12 DIAGNOSIS — R06.00 DYSPNEA: ICD-10-CM

## 2023-11-12 DIAGNOSIS — E11.65 POORLY CONTROLLED DIABETES MELLITUS: ICD-10-CM

## 2023-11-12 PROCEDURE — 99285 EMERGENCY DEPT VISIT HI MDM: CPT

## 2023-11-12 NOTE — Clinical Note
"Rick Coronado (Jeremy) was seen and treated in our emergency department on 11/12/2023.  He may return to work on 11/14/2023.       If you have any questions or concerns, please don't hesitate to call.      Anish Coronado, DO"

## 2023-11-13 VITALS
WEIGHT: 304 LBS | DIASTOLIC BLOOD PRESSURE: 83 MMHG | TEMPERATURE: 99 F | HEIGHT: 69 IN | HEART RATE: 89 BPM | OXYGEN SATURATION: 97 % | RESPIRATION RATE: 14 BRPM | BODY MASS INDEX: 45.03 KG/M2 | SYSTOLIC BLOOD PRESSURE: 132 MMHG

## 2023-11-13 LAB
ANION GAP SERPL CALC-SCNC: 13 MEQ/L
APPEARANCE UR: CLEAR
B-OH-BUTYR SERPL-MCNC: 0.1 MMOL/L
BACTERIA #/AREA URNS AUTO: ABNORMAL /HPF
BILIRUB UR QL STRIP.AUTO: NEGATIVE
BNP BLD-MCNC: <10 PG/ML
BUN SERPL-MCNC: 10.5 MG/DL (ref 8.9–20.6)
CALCIUM SERPL-MCNC: 9.4 MG/DL (ref 8.4–10.2)
CHLORIDE SERPL-SCNC: 97 MMOL/L (ref 98–107)
CO2 SERPL-SCNC: 25 MMOL/L (ref 22–29)
COLOR UR AUTO: COLORLESS
CREAT SERPL-MCNC: 1.17 MG/DL (ref 0.73–1.18)
CREAT/UREA NIT SERPL: 9
D DIMER PPP IA.FEU-MCNC: 0.27 UG/ML FEU (ref 0–0.5)
GFR SERPLBLD CREATININE-BSD FMLA CKD-EPI: >60 MLS/MIN/1.73/M2
GLUCOSE SERPL-MCNC: 440 MG/DL (ref 74–100)
GLUCOSE UR QL STRIP.AUTO: ABNORMAL
HOLD SPECIMEN: NORMAL
HYALINE CASTS #/AREA URNS LPF: ABNORMAL /LPF
KETONES UR QL STRIP.AUTO: NEGATIVE
LEUKOCYTE ESTERASE UR QL STRIP.AUTO: NEGATIVE
MAGNESIUM SERPL-MCNC: 1.9 MG/DL (ref 1.6–2.6)
NITRITE UR QL STRIP.AUTO: NEGATIVE
PH UR STRIP.AUTO: 6 [PH]
POCT GLUCOSE: 376 MG/DL (ref 70–110)
POCT GLUCOSE: 425 MG/DL (ref 70–110)
POCT GLUCOSE: 496 MG/DL (ref 70–110)
POTASSIUM SERPL-SCNC: 4 MMOL/L (ref 3.5–5.1)
PROT UR QL STRIP.AUTO: NEGATIVE
RBC #/AREA URNS AUTO: ABNORMAL /HPF
RBC UR QL AUTO: NEGATIVE
SODIUM SERPL-SCNC: 135 MMOL/L (ref 136–145)
SP GR UR STRIP.AUTO: 1.03 (ref 1–1.03)
SQUAMOUS #/AREA URNS LPF: ABNORMAL /HPF
TROPONIN I SERPL-MCNC: <0.01 NG/ML (ref 0–0.04)
TROPONIN I SERPL-MCNC: <0.01 NG/ML (ref 0–0.04)
UROBILINOGEN UR STRIP-ACNC: NORMAL
WBC #/AREA URNS AUTO: ABNORMAL /HPF

## 2023-11-13 PROCEDURE — 85379 FIBRIN DEGRADATION QUANT: CPT | Performed by: EMERGENCY MEDICINE

## 2023-11-13 PROCEDURE — 93005 ELECTROCARDIOGRAM TRACING: CPT

## 2023-11-13 PROCEDURE — 82010 KETONE BODYS QUAN: CPT | Performed by: EMERGENCY MEDICINE

## 2023-11-13 PROCEDURE — 84484 ASSAY OF TROPONIN QUANT: CPT | Performed by: EMERGENCY MEDICINE

## 2023-11-13 PROCEDURE — 83735 ASSAY OF MAGNESIUM: CPT | Performed by: EMERGENCY MEDICINE

## 2023-11-13 PROCEDURE — 81001 URINALYSIS AUTO W/SCOPE: CPT | Performed by: EMERGENCY MEDICINE

## 2023-11-13 PROCEDURE — 82962 GLUCOSE BLOOD TEST: CPT

## 2023-11-13 PROCEDURE — 25000003 PHARM REV CODE 250: Performed by: EMERGENCY MEDICINE

## 2023-11-13 PROCEDURE — 96372 THER/PROPH/DIAG INJ SC/IM: CPT | Mod: 59 | Performed by: EMERGENCY MEDICINE

## 2023-11-13 PROCEDURE — 96360 HYDRATION IV INFUSION INIT: CPT

## 2023-11-13 PROCEDURE — 83880 ASSAY OF NATRIURETIC PEPTIDE: CPT | Performed by: EMERGENCY MEDICINE

## 2023-11-13 PROCEDURE — 63600175 PHARM REV CODE 636 W HCPCS: Performed by: EMERGENCY MEDICINE

## 2023-11-13 PROCEDURE — 80048 BASIC METABOLIC PNL TOTAL CA: CPT | Performed by: EMERGENCY MEDICINE

## 2023-11-13 RX ORDER — INSULIN ASPART 100 [IU]/ML
10 INJECTION, SOLUTION INTRAVENOUS; SUBCUTANEOUS
Status: COMPLETED | OUTPATIENT
Start: 2023-11-13 | End: 2023-11-13

## 2023-11-13 RX ORDER — INSULIN ASPART 100 [IU]/ML
12 INJECTION, SOLUTION INTRAVENOUS; SUBCUTANEOUS
Status: COMPLETED | OUTPATIENT
Start: 2023-11-13 | End: 2023-11-13

## 2023-11-13 RX ADMIN — SODIUM CHLORIDE 1000 ML: 9 INJECTION, SOLUTION INTRAVENOUS at 12:11

## 2023-11-13 RX ADMIN — INSULIN ASPART 12 UNITS: 100 INJECTION, SOLUTION INTRAVENOUS; SUBCUTANEOUS at 03:11

## 2023-11-13 RX ADMIN — INSULIN ASPART 10 UNITS: 100 INJECTION, SOLUTION INTRAVENOUS; SUBCUTANEOUS at 02:11

## 2023-11-13 NOTE — ED PROVIDER NOTES
ED PROVIDER NOTE  11/12/2023    CHIEF COMPLAINT:   Chief Complaint   Patient presents with    Chest Pain    Shortness of Breath     States woke SOB and with chest pain.  Denies Chest pain at this time.  State SOB.  Sats 98%.         HISTORY OF PRESENT ILLNESS:   Rick Coronado is a 38 y.o. male who presents with chief complaint Chest pain. Onset was tonight when he awoke from sleep with chest pain characterized as tightness that last for about 30 minutes before resolving. States that he took some acetaminophen and it seemed to get better. No aggravating factors noted. Currently pain free. Associated symptoms of shortness of breath which he states he has experienced with exertion since he got covid last month but is worse tonight. Denies fever, hemoptysis, diaphoresis, nausea, vomiting, diarrhea, dysuria.    The history is provided by the patient.         REVIEW OF SYSTEMS: as noted in the HPI.  NURSING NOTES REVIEWED      PAST MEDICAL/SURGICAL HISTORY:   Past Medical History:   Diagnosis Date    Diabetes mellitus     Hyperlipidemia     Hypertension     History reviewed. No pertinent surgical history.    FAMILY HISTORY:   Family History   Problem Relation Age of Onset    Diabetes Mother        SOCIAL HISTORY:   Social History     Tobacco Use    Smoking status: Never     Passive exposure: Never    Smokeless tobacco: Never   Substance Use Topics    Alcohol use: Never    Drug use: Never       ALLERGIES: Review of patient's allergies indicates:  No Known Allergies    PHYSICAL EXAM:  Initial Vitals [11/13/23 0002]   BP Pulse Resp Temp SpO2   (!) 163/103 99 18 99 °F (37.2 °C) 98 %      MAP       --         Physical Exam    Nursing note and vitals reviewed.  Constitutional: He appears well-developed and well-nourished. He is Obese . No distress.   HENT:   Head: Normocephalic and atraumatic.   Nose: Nose normal.   Mouth/Throat: Oropharynx is clear and moist and mucous membranes are normal.   Eyes: Conjunctivae and EOM  are normal. Pupils are equal, round, and reactive to light.   Neck: Neck supple. No tracheal deviation present.   Cardiovascular:  Normal rate, regular rhythm, normal heart sounds, intact distal pulses and normal pulses.           Pulmonary/Chest: Effort normal and breath sounds normal. No respiratory distress.   Abdominal: Abdomen is soft. There is no abdominal tenderness. There is no rebound and no guarding.   Musculoskeletal:         General: Normal range of motion.      Cervical back: Neck supple.     Neurological: He is alert and oriented to person, place, and time. GCS eye subscore is 4. GCS verbal subscore is 5. GCS motor subscore is 6.   CN II-XII intact. Moves all extremities. No gross sensory or motor deficits.   Skin: Skin is warm, dry and intact.   Psychiatric: He has a normal mood and affect. His speech is normal and behavior is normal. Judgment and thought content normal. Cognition and memory are normal.         RESULTS:  Labs Reviewed   BASIC METABOLIC PANEL - Abnormal; Notable for the following components:       Result Value    Sodium Level 135 (*)     Chloride 97 (*)     Glucose Level 440 (*)     All other components within normal limits   URINALYSIS, REFLEX TO URINE CULTURE - Abnormal; Notable for the following components:    Color, UA Colorless (*)     Specific Gravity, UA 1.033 (*)     Glucose, UA 4+ (*)     WBC, UA 6-10 (*)     Squamous Epithelial Cells, UA Occ (*)     All other components within normal limits   POCT GLUCOSE - Abnormal; Notable for the following components:    POCT Glucose 425 (*)     All other components within normal limits   POCT GLUCOSE - Abnormal; Notable for the following components:    POCT Glucose 496 (*)     All other components within normal limits   POCT GLUCOSE - Abnormal; Notable for the following components:    POCT Glucose 376 (*)     All other components within normal limits   B-TYPE NATRIURETIC PEPTIDE - Normal   TROPONIN I - Normal   MAGNESIUM - Normal   D  DIMER, QUANTITATIVE - Normal   BETA - HYDROXYBUTYRATE, SERUM - Normal   TROPONIN I - Normal   EXTRA TUBES    Narrative:     The following orders were created for panel order EXTRA TUBES.  Procedure                               Abnormality         Status                     ---------                               -----------         ------                     Lavender Top Hold[[id number]]                               Final result               Gold Top Hold[[id number]]                                   Final result                 Please view results for these tests on the individual orders.   LAVENDER TOP HOLD   GOLD TOP HOLD   EXTRA TUBES    Narrative:     The following orders were created for panel order EXTRA TUBES.  Procedure                               Abnormality         Status                     ---------                               -----------         ------                     Light Blue Top Hold[[id number]]                             Final result               Light Green Top Hold[[id number]]                            Final result                 Please view results for these tests on the individual orders.   LIGHT BLUE TOP HOLD   LIGHT GREEN TOP HOLD   POCT GLUCOSE, HAND-HELD DEVICE     Imaging Results              X-Ray Chest AP Portable (Preliminary result)  Result time 11/13/23 00:38:01      Wet Read by Anish Coronado DO (11/13/23 00:38:01, [facility], Emergency Medicine)    Normal cardiomediastinal silhouette. No dense lobar consolidation or pneumothorax.                                    PROCEDURES:  Procedures    ECG:  EKG Readings: (Independently Interpreted)   Initial Reading: No STEMI. Rhythm: Normal Sinus Rhythm. Heart Rate: 95. Ectopy: No Ectopy. Conduction: Normal. Axis: Normal.       ED COURSE AND MEDICAL DECISION MAKING:  Medications   sodium chloride 0.9% bolus 1,000 mL 1,000 mL (0 mLs Intravenous Stopped 11/13/23 0150)   insulin aspart U-100 injection 10  Units (10 Units Subcutaneous Given 11/13/23 0229)   insulin aspart U-100 injection 12 Units (12 Units Subcutaneous Given 11/13/23 0334)     ED Course as of 11/13/23 0619 Mon Nov 13, 2023   0123 Beta-Hydroxybutyrate: 0.10 [IB]   0128 Creatinine: 1.17 [IB]   0128 Glucose(!): 440 [IB]   0128 CO2: 25 [IB]   0128 Anion Gap: 13.0 [IB]   0129 Troponin I: <0.010 [IB]   0129 Magnesium : 1.90 [IB]   0129 Potassium: 4.0 [IB]   0202 BNP: <10.0 [IB]   0211 D-Dimer: 0.27 [IB]   0237 Glucose, UA(!): 4+ [IB]   0238 Ketones, UA: Negative [IB]   0323 Troponin I: <0.010 [IB]   0328 Repeat glucose is 496. Will cover with 12 units insulin aspart. [IB]   0329 POCT Glucose(!!): 496 [IB]      ED Course User Index  [IB] Anish Coronado,      Additional MDM:   Heart Score:    History:          Slightly suspicious.  ECG:             Normal  Age:               Less than 45 years  Risk factors: >= 3 risk factors or history of atherosclerotic disease  Troponin:       Less than or equal to normal limit  Heart Score = 2          Medical Decision Making  The patient presented with chest pain of uncertain etiology. Based on their history, lab analysis, EKG (which showed no evidence of ischemia or infarction), and imaging, in addition to the patient's physical exam, I see no evidence at this time for a malignant etiology for the patient's chest pain. There is no acute evidence for pulmonary embolus, acute myocardial infarction, pneumothorax, esophageal rupture, cardiac tamponade, thoracic artery dissection, or any other emergent cardiac, pulmonary or aortic pathology at this time.    Based on the nature and long duration of the patient's pain, paucity of EKG findings, and normal cardiac enzymatic blood analysis, acute coronary syndrome is exceedingly unlikely. It is highly likely that cardiac enzymes would be abnormal in chest pain of this duration if their chest pain was attributable to ACS. The patient also has very low risk for coronary artery  disease based on their risk factor profile with HEART score 2.    This patient may require cardiac stress testing on an outpatient basis and arrangements for this may be made during their follow-up visit with their primary care physician. The patient understands that at this time there is no evidence for a more malignant underlying process, but the patient also understands that early in the process of an illness, an emergency department workup can be falsely reassuring. Routine discharge counseling was given to the patient and the patient understands that worsening, changing, or persistent symptoms should prompt an immediate call or follow up with their primary physician or the emergency department immediately. The importance of close follow up was also discussed with the patient.  This patient may require cardiac stress testing on an outpatient basis and arrangements for this may be made during their follow-up visit with their primary care physician. The patient understands that at this time there is no evidence for a more malignant underlying process, but the patient also understands that early in the process of an illness, an emergency department workup can be falsely reassuring. Routine discharge counseling was given to the patient and the patient understands that worsening, changing, or persistent symptoms should prompt an immediate call or follow up with their primary physician or the emergency department immediately. The importance of close follow up was also discussed with the patient.  Blood sugar improved after 22 units of SQ insulin aspart.  Instructed to take his medications as directed and follow up with his PCP.  Given strict ED return precautions. I have spoken with the patient and/or caregivers. I have explained the patient's condition, diagnoses and treatment plan based on the information available to me at this time. I have answered the patient's and/or caregiver's questions and addressed any  concerns. The patient and/or caregivers have as good an understanding of the patient's diagnosis, condition and treatment plan as can be expected at this point. The vital signs have been stable. The patient's condition is stable and appropriate for discharge from the emergency department.     The patient will pursue further outpatient evaluation with the primary care physician or other designated or consulting physician as outlined in the discharge instructions. The patient and/or caregivers are agreeable to this plan of care and follow-up instructions have been explained in detail. The patient and/or caregivers have received these instructions in written format and have expressed an understanding of the discharge instructions. The patient and/or caregivers are aware that any significant change in condition or worsening of symptoms should prompt an immediate return to this or the closest emergency department or a call to 911.      Amount and/or Complexity of Data Reviewed  External Data Reviewed: notes.  Labs: ordered. Decision-making details documented in ED Course.  Radiology: ordered and independent interpretation performed.  ECG/medicine tests: ordered and independent interpretation performed.    Risk  Prescription drug management.  Drug therapy requiring intensive monitoring for toxicity.  Decision regarding hospitalization.        CLINICAL IMPRESSION:  1. Chest pain, unspecified type    2. Dyspnea    3. Poorly controlled diabetes mellitus        DISPOSITION:   ED Disposition Condition    Discharge Stable            ED Prescriptions    None       Follow-up Information       Follow up With Specialties Details Why Contact Info    Davey Reyes MD Internal Medicine Schedule an appointment as soon as possible for a visit   [address] [facility] [address] [phone]      [facility] Emergency Medicine  If symptoms worsen [address]  [address]  [phone]               Anish Coronado, DO  11/13/23 0619       Anish Coronado, DO  11/13/23 0619

## 2024-01-31 DIAGNOSIS — Z79.4 TYPE 2 DIABETES MELLITUS WITH OTHER SPECIFIED COMPLICATION, WITH LONG-TERM CURRENT USE OF INSULIN: ICD-10-CM

## 2024-01-31 DIAGNOSIS — E11.69 TYPE 2 DIABETES MELLITUS WITH OTHER SPECIFIED COMPLICATION, WITH LONG-TERM CURRENT USE OF INSULIN: ICD-10-CM

## 2024-02-01 RX ORDER — METFORMIN HYDROCHLORIDE 1000 MG/1
1000 TABLET ORAL 2 TIMES DAILY
Qty: 60 TABLET | Refills: 1 | Status: SHIPPED | OUTPATIENT
Start: 2024-02-01 | End: 2024-05-30 | Stop reason: SDUPTHER

## 2024-03-14 ENCOUNTER — TELEPHONE (OUTPATIENT)
Dept: INTERNAL MEDICINE | Facility: CLINIC | Age: 39
End: 2024-03-14

## 2024-03-14 NOTE — TELEPHONE ENCOUNTER
Spoke to patient over the phone and he states he has problems with his insurance and will not be able to make appointment today. Advised to reschedule. Call ended.

## 2024-05-08 DIAGNOSIS — E11.69 TYPE 2 DIABETES MELLITUS WITH OTHER SPECIFIED COMPLICATION, WITH LONG-TERM CURRENT USE OF INSULIN: ICD-10-CM

## 2024-05-08 DIAGNOSIS — Z79.4 TYPE 2 DIABETES MELLITUS WITH OTHER SPECIFIED COMPLICATION, WITH LONG-TERM CURRENT USE OF INSULIN: ICD-10-CM

## 2024-05-08 NOTE — TELEPHONE ENCOUNTER
"----- Message from Lisa Martins sent at 5/8/2024  2:49 PM CDT -----  Regarding: Eric- Medication Refills     Refill Request     Provider: Dr. Reyes     Last Visit: 10/23/2023     Next Visit: 05/30/2024     Patient's Contact #: [phone]     Medication Name & Dose: BD INSULIN SYRINGE ULTRA-FINE 0.5 mL 30 gauge x 1/2" Syrg                                            BD ELSA 2ND GEN PEN NEEDLE 32 gauge x 5/32" Ndle                                              insulin aspart protamine-insulin aspart (NOVOLOG MIX 70-30FLEXPEN U-100) 100 unit/mL (70-30) InPn pen                                                 Preferred Pharmacy: [facility]     Comment:        Thanks for all that you do,    Lisa  "

## 2024-05-10 RX ORDER — PEN NEEDLE, DIABETIC 32GX 5/32"
NEEDLE, DISPOSABLE MISCELLANEOUS
Qty: 100 EACH | Refills: 3 | Status: SHIPPED | OUTPATIENT
Start: 2024-05-10 | End: 2024-05-30 | Stop reason: SDUPTHER

## 2024-05-10 RX ORDER — PEN NEEDLE, DIABETIC 29 G X1/2"
NEEDLE, DISPOSABLE MISCELLANEOUS
Qty: 90 EACH | Refills: 3 | Status: SHIPPED | OUTPATIENT
Start: 2024-05-10 | End: 2024-05-30 | Stop reason: SDUPTHER

## 2024-05-10 RX ORDER — INSULIN ASPART 100 [IU]/ML
15 INJECTION, SUSPENSION SUBCUTANEOUS
Qty: 27 ML | Refills: 3 | Status: SHIPPED | OUTPATIENT
Start: 2024-05-10 | End: 2024-05-30 | Stop reason: SDUPTHER

## 2024-05-30 ENCOUNTER — OFFICE VISIT (OUTPATIENT)
Dept: INTERNAL MEDICINE | Facility: CLINIC | Age: 39
End: 2024-05-30
Payer: COMMERCIAL

## 2024-05-30 VITALS
HEIGHT: 69 IN | SYSTOLIC BLOOD PRESSURE: 134 MMHG | BODY MASS INDEX: 44.73 KG/M2 | TEMPERATURE: 98 F | HEART RATE: 91 BPM | RESPIRATION RATE: 16 BRPM | DIASTOLIC BLOOD PRESSURE: 82 MMHG | WEIGHT: 302 LBS | OXYGEN SATURATION: 98 %

## 2024-05-30 DIAGNOSIS — E78.5 HYPERLIPIDEMIA, UNSPECIFIED HYPERLIPIDEMIA TYPE: ICD-10-CM

## 2024-05-30 DIAGNOSIS — E11.00 TYPE 2 DIABETES MELLITUS WITH HYPEROSMOLARITY WITHOUT COMA, UNSPECIFIED WHETHER LONG TERM INSULIN USE: ICD-10-CM

## 2024-05-30 DIAGNOSIS — Z79.4 TYPE 2 DIABETES MELLITUS WITH OTHER SPECIFIED COMPLICATION, WITH LONG-TERM CURRENT USE OF INSULIN: Primary | ICD-10-CM

## 2024-05-30 DIAGNOSIS — E11.69 TYPE 2 DIABETES MELLITUS WITH OTHER SPECIFIED COMPLICATION, WITH LONG-TERM CURRENT USE OF INSULIN: Primary | ICD-10-CM

## 2024-05-30 LAB — HBA1C MFR BLD: NORMAL %

## 2024-05-30 PROCEDURE — 99215 OFFICE O/P EST HI 40 MIN: CPT | Mod: PBBFAC | Performed by: INTERNAL MEDICINE

## 2024-05-30 PROCEDURE — 83036 HEMOGLOBIN GLYCOSYLATED A1C: CPT | Mod: PBBFAC | Performed by: INTERNAL MEDICINE

## 2024-05-30 RX ORDER — INSULIN ASPART 100 [IU]/ML
15 INJECTION, SUSPENSION SUBCUTANEOUS
Qty: 27 ML | Refills: 3 | Status: SHIPPED | OUTPATIENT
Start: 2024-05-30 | End: 2025-05-30

## 2024-05-30 RX ORDER — ROSUVASTATIN CALCIUM 10 MG/1
10 TABLET, COATED ORAL NIGHTLY
Qty: 30 TABLET | Refills: 3 | Status: SHIPPED | OUTPATIENT
Start: 2024-05-30

## 2024-05-30 RX ORDER — ONDANSETRON 4 MG/1
4 TABLET, ORALLY DISINTEGRATING ORAL EVERY 8 HOURS PRN
Qty: 14 TABLET | Refills: 0 | Status: SHIPPED | OUTPATIENT
Start: 2024-05-30

## 2024-05-30 RX ORDER — LOSARTAN POTASSIUM 25 MG/1
25 TABLET ORAL DAILY
Qty: 90 TABLET | Refills: 1 | Status: SHIPPED | OUTPATIENT
Start: 2024-05-30 | End: 2024-11-26

## 2024-05-30 RX ORDER — METFORMIN HYDROCHLORIDE 1000 MG/1
1000 TABLET ORAL 2 TIMES DAILY
Qty: 60 TABLET | Refills: 1 | Status: SHIPPED | OUTPATIENT
Start: 2024-05-30 | End: 2024-07-29

## 2024-05-30 RX ORDER — PIOGLITAZONEHYDROCHLORIDE 15 MG/1
15 TABLET ORAL DAILY
Qty: 90 TABLET | Refills: 3 | Status: SHIPPED | OUTPATIENT
Start: 2024-05-30 | End: 2025-05-30

## 2024-05-30 RX ORDER — FAMOTIDINE 20 MG/1
20 TABLET, FILM COATED ORAL 2 TIMES DAILY
Qty: 60 TABLET | Refills: 2 | Status: SHIPPED | OUTPATIENT
Start: 2024-05-30 | End: 2024-08-28

## 2024-05-30 RX ORDER — PEN NEEDLE, DIABETIC 29 G X1/2"
NEEDLE, DISPOSABLE MISCELLANEOUS
Qty: 90 EACH | Refills: 3 | Status: SHIPPED | OUTPATIENT
Start: 2024-05-30

## 2024-05-30 RX ORDER — PEN NEEDLE, DIABETIC 32GX 5/32"
NEEDLE, DISPOSABLE MISCELLANEOUS
Qty: 100 EACH | Refills: 3 | Status: SHIPPED | OUTPATIENT
Start: 2024-05-30

## 2024-05-30 NOTE — PROGRESS NOTES
"  [facility]  [address] [facility] [address]      PATIENT NAME: Rick Coronado  : 1985  DATE: 24  MRN: [id number]      Billing Provider: Davey Reyes MD  Level of Service:   Patient PCP Information       Provider PCP Type    Davey Reyes MD General            Reason for Visit / Chief Complaint: Follow-up (Routine follow up visit. Need refills on Rx)       History of Present Illness / Problem Focused Workflow     Rick Coronado presents to the clinic with Follow-up (Routine follow up visit. Need refills on Rx)      Patient was last seen on 11/10/2023.   He was a 38-year-old male.  History of uncontrolled diabetes mellitus.   During my last visit, he was on metformin 1000 mg b.I.d., 744689 units b.I.d. on a sliding scale, pioglitazone 50 mg and Mounjaro.    States that his insurance got cut. He recently got insurance but not has been taking meds for the past 3 months.     Has not been able to check glucose since he was out of strips and his insurance got cut.     Currently feeling alright. No complaints.      Review of Systems     10-point ROS performed and negative except as above.      Medical / Social / Family History     Past Medical History:   Diagnosis Date    Diabetes mellitus     Hyperlipidemia     Hypertension        History reviewed. No pertinent surgical history.    Social History    reports that he has never smoked. He has never been exposed to tobacco smoke. He has never used smokeless tobacco. He reports that he does not drink alcohol and does not use drugs.    Family History  's family history includes Diabetes in his mother.    Medications and Allergies     Medications  Outpatient Medications Marked as Taking for the 24 encounter (Office Visit) with Davey Reyes MD   Medication Sig Dispense Refill    BD INSULIN SYRINGE ULTRA-FINE 0.5 mL 30 gauge x 1/2" Syrg USE TO INJECT LANTUS AT " "BEDTIME DAILY 90 each 3    BD ELSA 2ND GEN PEN NEEDLE 32 gauge x 5/32" Ndle USE WITH LANTUS DAILY 100 each 3    blood sugar diagnostic, drum (ACCU-CHEK COMPACT TEST) Strp 100 each by Misc.(Non-Drug; Combo Route) route 2 (two) times a day. 100 each 2    blood-glucose meter, drum-type Kit 1 each by Misc.(Non-Drug; Combo Route) route 2 (two) times a day. 1 kit 0    insulin aspart protamine-insulin aspart (NOVOLOG MIX 70-30FLEXPEN U-100) 100 unit/mL (70-30) InPn pen Inject 15 Units into the skin 2 (two) times daily before meals. 27 mL 3    lancing device (ACCU-CHEK SOFTCLIX LANCET DEV) Misc 100 each by Misc.(Non-Drug; Combo Route) route 2 (two) times a day. 100 each 1    ondansetron (ZOFRAN-ODT) 4 MG TbDL Take 1 tablet (4 mg total) by mouth every 8 (eight) hours as needed (nausea). 14 tablet 0    pen needle,diabetic dual safty (BD AUTOSHIELD DUO PEN NEEDLE) 30 gauge x 3/16" Ndle Use for diabetes 90 each 0    pioglitazone (ACTOS) 15 MG tablet Take 1 tablet (15 mg total) by mouth once daily. 90 tablet 3    TRUE METRIX GLUCOSE METER Misc USE AS DIRECTED TWICE DAILY      TRUE METRIX GLUCOSE TEST STRIP Strp USE TO TEST THREE TIMES DAILY 100 strip 5    TRUE METRIX GLUCOSE TEST STRIP Strp USE TO TEST THREE TIMES DAILY      TRUEPLUS LANCETS 33 gauge Misc USE TO TEST THREE TIMES DAILY 100 each 5    TRUEPLUS LANCETS 33 gauge Misc USE TO TEST THREE TIMES DAILY 100 each 5       Allergies  Review of patient's allergies indicates:  No Known Allergies    Physical Examination     Vitals:    05/30/24 1443   BP: 134/82   Pulse: 91   Resp: 16   Temp: 98.1 °F (36.7 °C)     General: AAOx3, NAD, alert and cooperative  HEENT: PERRLA, EOMI, normal conjunctiva  Neck: no LAD, no JVD, supple, no masses or thyromegaly  CVS: S1/S2 nml, RRR, no murmurs, rubs or gallops, no carotid bruits  Resp: CTA B/L, no rhonchi, rales, or wheezing  GI: no TTP, not distended, BS+  : no CVA tenderness  Skin: not jaundiced, warm, no rashes  Musculoskeletal: normal " ROM, no joint tenderness, normal muscular development  Protective Sensation (w/ 10 gram monofilament):  Right: Intact  Left: Intact    Visual Inspection:  Normal -  Bilateral    Pedal Pulses:   Right: Present  Left: Present    Posterior Tibialis Pulses:   Right:Present  Left: Present   Extremities: no peripheral edema, peripheral pulses intact  Neuro: CN II-XII grossly intact, strength and sensation symmetric and intact throughout, no focal neurological deficits        Results   Reviewed recent labs.        Assessment and Plan (including Health Maintenance)     Plan:   There are no diagnoses linked to this encounter.    Uncontrolled diabetes mellitus E11.65  POC A1c on 6/8/23 was 12.8  POC A1c today is >15  States he was out of meds for 3 months since his insurance was cut.  Currently on metformin 1000 mg bid, and 70/30 26 units bid on sliding scale pioglitazone 15 mg   Has not been able to check morning sugars  Advised to increase by another 2 units every 5 days until glucose is less than 120.   crestor 10 mg at bedtime.  Follow ADA diet.  Avoid soda, simple sweets, and limit rice/pasta/bread/starches and consume brown options when possible.   Will refer to nutritional services.  Has seen diabetic education.  Maintain healthy weight with BMI goal <30.   Perform aerobic exercise for 150 minutes per week (or 5 days a week for 30 minutes each day).   Examine and wash feet daily.   Eye exam: seeing eye doctor.   Foot exam: done on 3/2/23  Missed endocrine appt. Will ask patient to call and reschedule  Referred to dietician, nutritional services, and endocrine     3. Morbid obesity with BMI of 45.0-49.9, adult E66.01  BMI 44 Goal BMI <30.  Aerobic exercise 150 minutes per week.  Avoid soda, simple sugars, sweets, excessive rice, pasta, potatoes or bread.   Choose brown options when available and portion control.  Limit fast foods and fried foods.   Choose complex carbs in moderation (ex: green, leafy vegetables, beans,  oatmeal).  Eat vegetables with lean meats daily.   Consider permanent healthy lifestyle changes.  Unable to tolerate victoza    4. HTN (hypertension) I10  BP today 134/82  Continue losartan to 12.5 mg qd  Follow a low sodium (less than 2 grams of sodium per day), DASH diet.   Continue medications as prescribed.  Monitor blood pressure and report any consistent values greater than 140/90 and keep a log.  Maintain healthy weight with a BMI goal of <30.   Aerobic exercise for 150 minutes per week (or 5 days a week for 30 minutes each day).    5. GERD (gastroesophageal reflux disease) K21.9  Continue PPI  Avoid spicy, acidic, fried food and alcohol.   Eat 2-3 hours before bed.  Avoid tight fitting clothes and chew food thoroughly.   Reduce caffeine intake, avoid soda.   Take meds as prescribed.       Routine labs.  F/u in 1 month to make sure patient is taking meds       Health Maintenance Due   Topic Date Due    Hepatitis C Screening  Never done    HIV Screening  Never done    Diabetes Urine Screening  09/28/2021    Lipid Panel  08/05/2023    COVID-19 Vaccine (1 - 2023-24 season) Never done    Hemoglobin A1c  02/09/2024    Foot Exam  03/02/2024    Eye Exam  03/02/2024         Health Maintenance Topics with due status: Not Due       Topic Last Completion Date    TETANUS VACCINE 01/11/2019    Influenza Vaccine Not Due       No follow-ups on file.     Future Appointments   Date Time Provider Department Center   5/30/2024  3:00 PM Davey Reyes MD [facility]            Signature:  Davey Reyes MD  [facility]  [address] [facility] [address]    Date of encounter: 5/30/24

## 2024-06-03 ENCOUNTER — TELEPHONE (OUTPATIENT)
Dept: INTENSIVE CARE | Facility: HOSPITAL | Age: 39
End: 2024-06-03
Payer: COMMERCIAL

## 2024-06-03 NOTE — TELEPHONE ENCOUNTER
----- Message from Davey Reyes MD sent at 6/3/2024  7:53 AM CDT -----  I tried calling the patient regarding his abnormal results. His sugars are very high. I wanted to see if he got his medications and if he has checked his sugar. Could you please try calling and finding out?  ----- Message -----  From: Lab, Background User  Sent: 5/30/2024   3:51 PM CDT  To: Davey Reyes MD

## 2024-06-06 ENCOUNTER — PATIENT OUTREACH (OUTPATIENT)
Dept: ADMINISTRATIVE | Facility: OTHER | Age: 39
End: 2024-06-06
Payer: COMMERCIAL

## 2024-06-10 ENCOUNTER — TELEPHONE (OUTPATIENT)
Dept: INTERNAL MEDICINE | Facility: CLINIC | Age: 39
End: 2024-06-10
Payer: COMMERCIAL

## 2024-06-10 NOTE — TELEPHONE ENCOUNTER
Unable to reach patient by phone. Left message on voice mail to call our office and let us know if he is taking his Rxs and how he is doing.

## 2024-07-16 ENCOUNTER — TELEPHONE (OUTPATIENT)
Dept: INTERNAL MEDICINE | Facility: CLINIC | Age: 39
End: 2024-07-16
Payer: COMMERCIAL

## 2024-07-30 ENCOUNTER — TELEPHONE (OUTPATIENT)
Dept: ENDOCRINOLOGY | Facility: CLINIC | Age: 39
End: 2024-07-30
Payer: COMMERCIAL

## 2024-08-22 ENCOUNTER — OFFICE VISIT (OUTPATIENT)
Dept: INTERNAL MEDICINE | Facility: CLINIC | Age: 39
End: 2024-08-22
Payer: COMMERCIAL

## 2024-08-22 VITALS
WEIGHT: 304 LBS | TEMPERATURE: 98 F | HEART RATE: 85 BPM | DIASTOLIC BLOOD PRESSURE: 75 MMHG | SYSTOLIC BLOOD PRESSURE: 117 MMHG | OXYGEN SATURATION: 98 % | RESPIRATION RATE: 16 BRPM | BODY MASS INDEX: 45.03 KG/M2 | HEIGHT: 69 IN

## 2024-08-22 DIAGNOSIS — E08.00 DIABETES MELLITUS DUE TO UNDERLYING CONDITION WITH HYPEROSMOLARITY WITHOUT COMA, UNSPECIFIED WHETHER LONG TERM INSULIN USE: Primary | ICD-10-CM

## 2024-08-22 DIAGNOSIS — Z79.4 DIABETES MELLITUS DUE TO UNDERLYING CONDITION WITH HYPEROSMOLARITY WITHOUT COMA, WITH LONG-TERM CURRENT USE OF INSULIN: Primary | ICD-10-CM

## 2024-08-22 DIAGNOSIS — Z79.4 TYPE 2 DIABETES MELLITUS WITH OTHER SPECIFIED COMPLICATION, WITH LONG-TERM CURRENT USE OF INSULIN: ICD-10-CM

## 2024-08-22 DIAGNOSIS — E08.00 DIABETES MELLITUS DUE TO UNDERLYING CONDITION WITH HYPEROSMOLARITY WITHOUT COMA, WITH LONG-TERM CURRENT USE OF INSULIN: Primary | ICD-10-CM

## 2024-08-22 DIAGNOSIS — E11.69 TYPE 2 DIABETES MELLITUS WITH OTHER SPECIFIED COMPLICATION, WITH LONG-TERM CURRENT USE OF INSULIN: ICD-10-CM

## 2024-08-22 LAB — HBA1C MFR BLD: NORMAL %

## 2024-08-22 PROCEDURE — 83036 HEMOGLOBIN GLYCOSYLATED A1C: CPT | Mod: PBBFAC | Performed by: INTERNAL MEDICINE

## 2024-08-22 PROCEDURE — 99215 OFFICE O/P EST HI 40 MIN: CPT | Mod: PBBFAC | Performed by: INTERNAL MEDICINE

## 2024-08-22 RX ORDER — LANCING DEVICE
100 EACH MISCELLANEOUS 2 TIMES DAILY
Qty: 100 EACH | Refills: 1 | Status: SHIPPED | OUTPATIENT
Start: 2024-08-22 | End: 2025-08-22

## 2024-08-22 RX ORDER — PEN NEEDLE, DIABETIC 29 G X1/2"
NEEDLE, DISPOSABLE MISCELLANEOUS
Qty: 90 EACH | Refills: 3 | Status: SHIPPED | OUTPATIENT
Start: 2024-08-22

## 2024-08-22 RX ORDER — INSULIN LISPRO 100 [IU]/ML
16 INJECTION, SOLUTION INTRAVENOUS; SUBCUTANEOUS
Qty: 14.4 ML | Refills: 11 | Status: SHIPPED | OUTPATIENT
Start: 2024-08-22 | End: 2025-08-22

## 2024-08-22 RX ORDER — INSULIN GLARGINE 100 [IU]/ML
50 INJECTION, SOLUTION SUBCUTANEOUS NIGHTLY
Qty: 15 ML | Refills: 11 | Status: SHIPPED | OUTPATIENT
Start: 2024-08-22 | End: 2025-08-22

## 2024-08-22 NOTE — PROGRESS NOTES
"  [facility]  [address] [facility] [address]      PATIENT NAME: Rick Coronado  : 1985  DATE: 24  MRN: [id number]      Billing Provider: Davey Reyes MD  Level of Service:   Patient PCP Information       Provider PCP Type    Davey Reyes MD General            Reason for Visit / Chief Complaint: Follow-up (Routine follow up visit. )       History of Present Illness / Problem Focused Workflow     Rick Coronado presents to the clinic with Follow-up (Routine follow up visit. )     Patient was last seen by me on May 30, 2024.  He was a 38-year-old male with a history of uncontrolled diabetes mellitus.    He was currently on metformin 1000 mg b.i.d., 70/30 30 b.i.d. and pioglitazone.     However, states he is only taking the metformin once a day. Checks sugars infrequently. States that when he takes it, it is "good". In the mornings it is 130s but after eating it goes up to 170s.      Review of Systems     10-point ROS performed and negative except as above.      Medical / Social / Family History     Past Medical History:   Diagnosis Date    Diabetes mellitus     Hyperlipidemia     Hypertension        History reviewed. No pertinent surgical history.    Social History    reports that he has never smoked. He has never been exposed to tobacco smoke. He has never used smokeless tobacco. He reports that he does not drink alcohol and does not use drugs.    Family History  's family history includes Diabetes in his mother.    Medications and Allergies     Medications  Outpatient Medications Marked as Taking for the 24 encounter (Office Visit) with Davey Reyes MD   Medication Sig Dispense Refill    BD INSULIN SYRINGE ULTRA-FINE 0.5 mL 30 gauge x 1/2" Syrg USE TO INJECT LANTUS AT BEDTIME DAILY 90 each 3    BD ELSA 2ND GEN PEN NEEDLE 32 gauge x 5/32" Ndle USE WITH LANTUS DAILY 100 each 3    blood sugar diagnostic " "Strp 100 each by Misc.(Non-Drug; Combo Route) route once daily. 100 each 3    blood sugar diagnostic, drum (ACCU-CHEK COMPACT TEST) Strp 100 each by Misc.(Non-Drug; Combo Route) route 2 (two) times a day. 100 each 2    insulin aspart protamine-insulin aspart (NOVOLOG MIX 70-30FLEXPEN U-100) 100 unit/mL (70-30) InPn pen Inject 15 Units into the skin 2 (two) times daily before meals. 27 mL 3    losartan (COZAAR) 25 MG tablet Take 1 tablet (25 mg total) by mouth once daily. 90 tablet 1    ondansetron (ZOFRAN-ODT) 4 MG TbDL Take 1 tablet (4 mg total) by mouth every 8 (eight) hours as needed (nausea). 14 tablet 0    pen needle,diabetic dual safty (BD AUTOSHIELD DUO PEN NEEDLE) 30 gauge x 3/16" Ndle Use for diabetes 90 each 0    pioglitazone (ACTOS) 15 MG tablet Take 1 tablet (15 mg total) by mouth once daily. 90 tablet 3    rosuvastatin (CRESTOR) 10 MG tablet Take 1 tablet (10 mg total) by mouth every evening. 30 tablet 3    triamcinolone acetonide 0.025% (KENALOG) 0.025 % Oint Apply topically.      TRUE METRIX GLUCOSE METER Misc USE AS DIRECTED TWICE DAILY      TRUE METRIX GLUCOSE TEST STRIP Strp USE TO TEST THREE TIMES DAILY 100 strip 5    TRUE METRIX GLUCOSE TEST STRIP Strp USE TO TEST THREE TIMES DAILY      TRUEPLUS LANCETS 33 gauge Misc USE TO TEST THREE TIMES DAILY 100 each 5    TRUEPLUS LANCETS 33 gauge Misc USE TO TEST THREE TIMES DAILY 100 each 5       Allergies  Review of patient's allergies indicates:  No Known Allergies    Physical Examination     Vitals:    08/22/24 1504   BP: 117/75   Pulse: 85   Resp: 16   Temp: 97.7 °F (36.5 °C)     General: AAOx3, NAD, alert and cooperative  HEENT: EOMI, normal conjunctiva  Neck: no LAD, no JVD, supple, no masses or thyromegaly  CVS: S1/S2 nml, RRR, no murmurs, rubs or gallops,   Resp: CTA B/L, no rhonchi, rales, or wheezing  GI: no TTP, not distended, BS+  Skin: not jaundiced, warm, no rashes  Musculoskeletal: normal ROM, no joint tenderness, normal muscular " development  Extremities: no peripheral edema, peripheral pulses intact  Neuro: CN II-XII grossly intact, strength and sensation symmetric and intact throughout, no focal neurological deficits        Results   Reviewed recent labs.        Assessment and Plan (including Health Maintenance)     Plan:   Rick was seen today for follow-up.    Diagnoses and all orders for this visit:    Diabetes mellitus due to underlying condition with hyperosmolarity without coma, unspecified whether long term insulin use  -     POCT HEMOGLOBIN A1C        Uncontrolled diabetes mellitus E11.65  POC A1c 12.7 today  Currently on metformin 1000 mg bid, and 70/30 26 units bid on sliding scale pioglitazone 15 mg . Was not taking metformin as directed and only taking it once a day.  Will stop 70/30  Start lantus 50 units at bedtime  Start lispro 16 units TIDAC  I had a very lengthy conversation with juwan about the severity of his diabetes. Advised him that he has to be compliant with his medications and diet. Otherwise, uncontrolled diabetes can lead to renal failure, amputations, cva, cad, coma and death. He voiced understanding to me.   crestor 10 mg at bedtime.  Follow ADA diet.  Will refer back to nutrition.  Avoid soda, simple sweets, and limit rice/pasta/bread/starches and consume brown options when possible.   Has seen diabetic education.  Maintain healthy weight with BMI goal <30.   Perform aerobic exercise for 150 minutes per week (or 5 days a week for 30 minutes each day).   Examine and wash feet daily.   Eye exam: seeing eye doctor.   Foot exam: done on 3/2/23  Awaiting endocrine appt.  Referred to dietician, nutritional services, and endocrine     3. Morbid obesity with BMI of 45.0-49.9, adult E66.01  BMI 44 Goal BMI <30.  Aerobic exercise 150 minutes per week.  Avoid soda, simple sugars, sweets, excessive rice, pasta, potatoes or bread.   Choose brown options when available and portion control.  Limit fast foods and fried  foods.   Choose complex carbs in moderation (ex: green, leafy vegetables, beans, oatmeal).  Eat vegetables with lean meats daily.   Consider permanent healthy lifestyle changes.  Unable to tolerate victoza    4. HTN (hypertension) I10  BP today 117/75  Continue losartan to 12.5 mg qd  Follow a low sodium (less than 2 grams of sodium per day), DASH diet.   Continue medications as prescribed.  Monitor blood pressure and report any consistent values greater than 140/90 and keep a log.  Maintain healthy weight with a BMI goal of <30.   Aerobic exercise for 150 minutes per week (or 5 days a week for 30 minutes each day).    5. GERD (gastroesophageal reflux disease) K21.9  Continue PPI  Avoid spicy, acidic, fried food and alcohol.   Eat 2-3 hours before bed.  Avoid tight fitting clothes and chew food thoroughly.   Reduce caffeine intake, avoid soda.   Take meds as prescribed.        Routine labs.  F/u in 1 month to make sure patient is taking meds    Health Maintenance Due   Topic Date Due    COVID-19 Vaccine (1 - 2023-24 season) Never done    Eye Exam  03/02/2024         Health Maintenance Topics with due status: Not Due       Topic Last Completion Date    TETANUS VACCINE 01/11/2019    Diabetes Urine Screening 05/30/2024    Foot Exam 05/30/2024    Lipid Panel 05/30/2024    Hemoglobin A1c 08/22/2024    Influenza Vaccine Not Due       No follow-ups on file.     No future appointments.           Signature:  Davey Reyes MD  [facility]  [address] [facility] [id number]    Date of encounter: 8/22/24

## 2024-09-19 ENCOUNTER — OFFICE VISIT (OUTPATIENT)
Dept: INTERNAL MEDICINE | Facility: CLINIC | Age: 39
End: 2024-09-19
Payer: COMMERCIAL

## 2024-09-19 VITALS
RESPIRATION RATE: 16 BRPM | DIASTOLIC BLOOD PRESSURE: 77 MMHG | SYSTOLIC BLOOD PRESSURE: 122 MMHG | HEART RATE: 83 BPM | WEIGHT: 301 LBS | HEIGHT: 69 IN | OXYGEN SATURATION: 99 % | BODY MASS INDEX: 44.58 KG/M2 | TEMPERATURE: 98 F

## 2024-09-19 DIAGNOSIS — Z79.4 DIABETES MELLITUS DUE TO UNDERLYING CONDITION WITH HYPEROSMOLARITY WITHOUT COMA, WITH LONG-TERM CURRENT USE OF INSULIN: Primary | ICD-10-CM

## 2024-09-19 DIAGNOSIS — E08.00 DIABETES MELLITUS DUE TO UNDERLYING CONDITION WITH HYPEROSMOLARITY WITHOUT COMA, WITH LONG-TERM CURRENT USE OF INSULIN: Primary | ICD-10-CM

## 2024-09-19 PROCEDURE — 99215 OFFICE O/P EST HI 40 MIN: CPT | Mod: PBBFAC | Performed by: INTERNAL MEDICINE

## 2024-09-19 NOTE — PROGRESS NOTES
"  [facility]  [address] [facility] [address]      PATIENT NAME: Rick Coronado  : 1985  DATE: 24  MRN: [id number]      Billing Provider: Davey Reyes MD  Level of Service:   Patient PCP Information       Provider PCP Type    Davey Reyes MD General            Reason for Visit / Chief Complaint: Follow-up (Routine follow up vist. )       History of Present Illness / Problem Focused Workflow     Rick Coronado presents to the clinic with Follow-up (Routine follow up vist. )     Patient was last seen last month.  He has a history of uncontrolled diabetes mellitus.  He was started on Lantus 50 units at bedtime and lispro 16 t.i.d..  He was also supposed to be taking metformin and pioglitazone.    Today, he states he is compliant with his meds. He states that he has lost some weight. States that the highest sugar it has been is 166 since starting this new regiment.    No other complaints.      Review of Systems     10-point ROS performed and negative except as above.      Medical / Social / Family History     Past Medical History:   Diagnosis Date    Diabetes mellitus     Hyperlipidemia     Hypertension        History reviewed. No pertinent surgical history.    Social History    reports that he has never smoked. He has never been exposed to tobacco smoke. He has never used smokeless tobacco. He reports that he does not drink alcohol and does not use drugs.    Family History  's family history includes Diabetes in his mother.    Medications and Allergies     Medications  Outpatient Medications Marked as Taking for the 24 encounter (Office Visit) with Davey Reyes MD   Medication Sig Dispense Refill    BD INSULIN SYRINGE ULTRA-FINE 0.5 mL 30 gauge x 1/2" Syrg USE TO INJECT LANTUS AT BEDTIME DAILY 90 each 3    BD ELSA 2ND GEN PEN NEEDLE 32 gauge x 5/32" Ndle USE WITH LANTUS DAILY 100 each 3    blood sugar " "diagnostic Strp 100 each by Misc.(Non-Drug; Combo Route) route once daily. 100 each 3    blood sugar diagnostic, drum (ACCU-CHEK COMPACT TEST) Strp 100 each by Misc.(Non-Drug; Combo Route) route 2 (two) times a day. 100 each 2    flash glucose sensor (FREESTYLE SULY 14 DAY SENSOR) Kit 1 Units by Misc.(Non-Drug; Combo Route) route continuous. 1 kit 0    insulin glargine U-100, Lantus, (LANTUS SOLOSTAR U-100 INSULIN) 100 unit/mL (3 mL) InPn pen Inject 50 Units into the skin every evening. 15 mL 11    insulin lispro (HUMALOG KWIKPEN INSULIN) 100 unit/mL pen Inject 16 Units into the skin 3 (three) times daily before meals. 14.4 mL 11    lancing device (ACCU-CHEK SOFTCLIX LANCET DEV) Misc 100 each by Misc.(Non-Drug; Combo Route) route 2 (two) times a day. 100 each 1    losartan (COZAAR) 25 MG tablet Take 1 tablet (25 mg total) by mouth once daily. 90 tablet 1    ondansetron (ZOFRAN-ODT) 4 MG TbDL Take 1 tablet (4 mg total) by mouth every 8 (eight) hours as needed (nausea). 14 tablet 0    pen needle,diabetic dual safty (BD AUTOSHIELD DUO PEN NEEDLE) 30 gauge x 3/16" Ndle Use for diabetes 90 each 0    pioglitazone (ACTOS) 15 MG tablet Take 1 tablet (15 mg total) by mouth once daily. 90 tablet 3    rosuvastatin (CRESTOR) 10 MG tablet Take 1 tablet (10 mg total) by mouth every evening. 30 tablet 3    tiZANidine (ZANAFLEX) 4 MG tablet Take 8 mg by mouth.      TRUE METRIX GLUCOSE METER Misc USE AS DIRECTED TWICE DAILY      TRUE METRIX GLUCOSE TEST STRIP Strp USE TO TEST THREE TIMES DAILY 100 strip 5    TRUE METRIX GLUCOSE TEST STRIP Strp USE TO TEST THREE TIMES DAILY      TRUEPLUS LANCETS 33 gauge Misc USE TO TEST THREE TIMES DAILY 100 each 5    TRUEPLUS LANCETS 33 gauge Misc USE TO TEST THREE TIMES DAILY 100 each 5       Allergies  Review of patient's allergies indicates:  No Known Allergies    Physical Examination   There were no vitals filed for this visit.  General: AAOx3, NAD, alert and cooperative  HEENT: ANN MARIE MART, " normal conjunctiva  Neck: no LAD, no JVD, supple, no masses or thyromegaly  CVS: S1/S2 nml, RRR, no murmurs, rubs or gallops, no carotid bruits  Resp: CTA B/L, no rhonchi, rales, or wheezing  GI: no TTP, not distended, BS+  : no CVA tenderness  Skin: not jaundiced, warm, no rashes  Musculoskeletal: normal ROM, no joint tenderness, normal muscular development  Extremities: no peripheral edema, peripheral pulses intact  Neuro: CN II-XII grossly intact, strength and sensation symmetric and intact throughout, no focal neurological deficits        Results   Reviewed recent labs.        Assessment and Plan (including Health Maintenance)     Plan:   There are no diagnoses linked to this encounter.    Uncontrolled diabetes mellitus E11.65  POC A1c 12.7 on 8/22/24  Currently on metformin 1000 mg bid, pioglitazone 15 mg qd.  Lantus 50 units qhs  Lispro 16 units tidac  I had a very lengthy conversation with juwan about the severity of his diabetes. Advised him that he has to be compliant with his medications and diet. Otherwise, uncontrolled diabetes can lead to renal failure, amputations, cva, cad, coma and death. He voiced understanding to me.   crestor 10 mg at bedtime.  Follow ADA diet.  Will refer back to nutrition.  Avoid soda, simple sweets, and limit rice/pasta/bread/starches and consume brown options when possible.   Has seen diabetic education.  Maintain healthy weight with BMI goal <30.   Perform aerobic exercise for 150 minutes per week (or 5 days a week for 30 minutes each day).   Examine and wash feet daily.   Eye exam: seeing eye doctor today   Foot exam: done on 3/2/23  Awaiting endocrine appt.  Referred to dietician, nutritional services, and endocrine     3. Morbid obesity with BMI of 45.0-49.9, adult E66.01  BMI 44 Goal BMI <30.  Aerobic exercise 150 minutes per week.  Avoid soda, simple sugars, sweets, excessive rice, pasta, potatoes or bread.   Choose brown options when available and portion  control.  Limit fast foods and fried foods.   Choose complex carbs in moderation (ex: green, leafy vegetables, beans, oatmeal).  Eat vegetables with lean meats daily.   Consider permanent healthy lifestyle changes.  Unable to tolerate victoza    4. HTN (hypertension) I10  BP today 117/75  Continue losartan to 12.5 mg qd  Follow a low sodium (less than 2 grams of sodium per day), DASH diet.   Continue medications as prescribed.  Monitor blood pressure and report any consistent values greater than 140/90 and keep a log.  Maintain healthy weight with a BMI goal of <30.   Aerobic exercise for 150 minutes per week (or 5 days a week for 30 minutes each day).    5. GERD (gastroesophageal reflux disease) K21.9  Continue PPI  Avoid spicy, acidic, fried food and alcohol.   Eat 2-3 hours before bed.  Avoid tight fitting clothes and chew food thoroughly.   Reduce caffeine intake, avoid soda.   Take meds as prescribed.        Routine labs.  F/u in 2 month to make sure patient is taking meds    Health Maintenance Due   Topic Date Due    Eye Exam  03/02/2024    Influenza Vaccine (1) Never done    COVID-19 Vaccine (1 - 2023-24 season) Never done         Health Maintenance Topics with due status: Not Due       Topic Last Completion Date    TETANUS VACCINE 01/11/2019    Diabetes Urine Screening 05/30/2024    Foot Exam 05/30/2024    Lipid Panel 05/30/2024    Hemoglobin A1c 08/22/2024       No follow-ups on file.     No future appointments.           Signature:  Davey Reyes MD  [facility]  [address] [facility] [address]    Date of encounter: 9/19/24

## 2024-12-12 ENCOUNTER — HOSPITAL ENCOUNTER (EMERGENCY)
Facility: HOSPITAL | Age: 39
Discharge: HOME | End: 2024-12-12
Attending: STUDENT IN AN ORGANIZED HEALTH CARE EDUCATION/TRAINING PROGRAM
Payer: COMMERCIAL

## 2024-12-12 VITALS
RESPIRATION RATE: 20 BRPM | BODY MASS INDEX: 44.27 KG/M2 | SYSTOLIC BLOOD PRESSURE: 134 MMHG | OXYGEN SATURATION: 96 % | HEART RATE: 102 BPM | HEIGHT: 69 IN | DIASTOLIC BLOOD PRESSURE: 79 MMHG | WEIGHT: 298.88 LBS | TEMPERATURE: 99 F

## 2024-12-12 DIAGNOSIS — R05.9 COUGH: ICD-10-CM

## 2024-12-12 DIAGNOSIS — R07.9 CHEST PAIN: ICD-10-CM

## 2024-12-12 DIAGNOSIS — J20.9 ACUTE BRONCHITIS, UNSPECIFIED ORGANISM: Primary | ICD-10-CM

## 2024-12-12 DIAGNOSIS — J06.9 VIRAL URI WITH COUGH: ICD-10-CM

## 2024-12-12 LAB
OHS QRS DURATION: 82 MS
OHS QTC CALCULATION: 413 MS

## 2024-12-12 PROCEDURE — 25000003 PHARM REV CODE 250

## 2024-12-12 PROCEDURE — 93005 ELECTROCARDIOGRAM TRACING: CPT

## 2024-12-12 PROCEDURE — 99284 EMERGENCY DEPT VISIT MOD MDM: CPT | Mod: 25

## 2024-12-12 RX ORDER — GUAIFENESIN 600 MG/1
1200 TABLET, EXTENDED RELEASE ORAL 2 TIMES DAILY
Qty: 40 TABLET | Refills: 0 | Status: SHIPPED | OUTPATIENT
Start: 2024-12-12 | End: 2024-12-22

## 2024-12-12 RX ORDER — ACETAMINOPHEN 500 MG
1000 TABLET ORAL
Status: COMPLETED | OUTPATIENT
Start: 2024-12-12 | End: 2024-12-12

## 2024-12-12 RX ORDER — BENZONATATE 100 MG/1
100 CAPSULE ORAL 3 TIMES DAILY PRN
Qty: 20 CAPSULE | Refills: 0 | Status: SHIPPED | OUTPATIENT
Start: 2024-12-12 | End: 2024-12-22

## 2024-12-12 RX ORDER — BENZONATATE 100 MG/1
100 CAPSULE ORAL ONCE
Status: COMPLETED | OUTPATIENT
Start: 2024-12-12 | End: 2024-12-12

## 2024-12-12 RX ADMIN — ACETAMINOPHEN 1000 MG: 500 TABLET ORAL at 03:12

## 2024-12-12 RX ADMIN — BENZONATATE 100 MG: 100 CAPSULE ORAL at 03:12

## 2024-12-12 NOTE — Clinical Note
"Rick Gonzalezy"YobanyCope was seen and treated in our emergency department on 12/12/2024.  He may return to work on 12/14/2024.       If you have any questions or concerns, please don't hesitate to call.      Arely Felix, PAGE"

## 2024-12-12 NOTE — DISCHARGE INSTRUCTIONS
Wash hands often, Warm salt water gargles, Throat lozenges  Rotate tylenol and Ibuprofen while awake, Soft foods, Increase water intake   If you smoke, try to quit.   Drink lots of fluids like water, juice, or broth. This will help replace any fluids lost if you have a runny nose or fever. Warm tea or soup can help soothe a sore throat.  If the air in your home feels dry, use a cool mist humidifier. This can help a stuffy nose and make it easier to breathe.  You can also use saline nose drops to relieve stuffiness.  If you decide to take over-the-counter cough or cold medicines, follow the directions on the label carefully. Be sure you do not take more than 1 medicine that contains acetaminophen. Also, if you have a heart problem or high blood pressure, check with your doctor before you take any of these medicines.  Wash your hands often. Cough or sneeze into a tissue or your elbow instead of your hands. This will help keep others healthy.

## 2024-12-12 NOTE — Clinical Note
"Rick Gonzalezy"YobanyLa Farge was seen and treated in our emergency department on 12/12/2024.  He may return to work on 12/14/2024.       If you have any questions or concerns, please don't hesitate to call.      Arely Felix, PAGE"

## 2024-12-12 NOTE — ED PROVIDER NOTES
Encounter Date: 12/12/2024       History     Chief Complaint   Patient presents with    Cough     Cough x1 week. Now having chest pain with coughing. No fever. No distress.     The history is provided by the patient.   Cough  This is a new problem. The current episode started several days ago (cough x 1 week). The problem occurs constantly. The problem has been gradually worsening. The cough is Non-productive (cough constant dry hacking, pain in bilateral posterior lower thorax when coughing). There has been no fever. Associated symptoms include chest pain and rhinorrhea. Pertinent negatives include no chills, no sweats, no weight loss, no ear congestion, no ear pain, no headaches, no sore throat, no myalgias, no shortness of breath, no wheezing and no eye redness. He has tried cough syrup for the symptoms. The treatment provided mild relief. He is not a smoker.     Review of patient's allergies indicates:  No Known Allergies  Past Medical History:   Diagnosis Date    Diabetes mellitus     Hyperlipidemia     Hypertension      History reviewed. No pertinent surgical history.  Family History   Problem Relation Name Age of Onset    Diabetes Mother       Social History     Tobacco Use    Smoking status: Never     Passive exposure: Never    Smokeless tobacco: Never   Substance Use Topics    Alcohol use: Never    Drug use: Never     Review of Systems   Constitutional: Negative.  Negative for chills and weight loss.   HENT:  Positive for congestion, postnasal drip, rhinorrhea and sinus pressure. Negative for dental problem, drooling, ear discharge, ear pain, facial swelling, hearing loss, mouth sores, nosebleeds, sinus pain, sneezing, sore throat, tinnitus, trouble swallowing and voice change.    Eyes: Negative.  Negative for redness.   Respiratory:  Positive for cough. Negative for apnea, choking, chest tightness, shortness of breath, wheezing and stridor.    Cardiovascular:  Positive for chest pain. Negative for  palpitations and leg swelling.        Pain when coughing   Gastrointestinal: Negative.    Endocrine: Negative.    Genitourinary: Negative.    Musculoskeletal: Negative.  Negative for myalgias.   Skin: Negative.    Neurological: Negative.  Negative for headaches.   Psychiatric/Behavioral: Negative.         Physical Exam     Initial Vitals [12/12/24 1415]   BP Pulse Resp Temp SpO2   134/79 102 20 99.2 °F (37.3 °C) 96 %      MAP       --         Physical Exam    Nursing note and vitals reviewed.  Constitutional: He appears well-developed and well-nourished. He is not diaphoretic. He is Obese . He is cooperative.  Non-toxic appearance. He does not have a sickly appearance. He does not appear ill. No distress.   No acute distress   HENT:   Head: Normocephalic and atraumatic.   Right Ear: Hearing, tympanic membrane, external ear and ear canal normal.   Left Ear: Hearing, tympanic membrane, external ear and ear canal normal.   Nose: Mucosal edema and rhinorrhea present. No nose lacerations, sinus tenderness, nasal deformity, septal deviation or nasal septal hematoma. No epistaxis.  No foreign bodies. Right sinus exhibits no maxillary sinus tenderness and no frontal sinus tenderness. Left sinus exhibits no maxillary sinus tenderness and no frontal sinus tenderness. Mouth/Throat: Uvula is midline and mucous membranes are normal. Oropharyngeal exudate present. No posterior oropharyngeal edema, posterior oropharyngeal erythema or tonsillar abscesses.   Eyes: Conjunctivae and EOM are normal. Pupils are equal, round, and reactive to light. Right eye exhibits no discharge. Left eye exhibits no discharge. No scleral icterus.   Neck: Trachea normal and phonation normal. Neck supple.   Normal range of motion.  Cardiovascular:  Normal rate, regular rhythm, normal heart sounds, intact distal pulses and normal pulses.           Pulmonary/Chest: Effort normal and breath sounds normal. No accessory muscle usage. No apnea, no tachypnea  and no bradypnea. No respiratory distress. He has no decreased breath sounds. He has no wheezes. He has no rhonchi. He has no rales. He exhibits no tenderness.   Normal effort, symmetrical chest rise and fall, no accessory muscle use. Bilateral clear breath sounds in all fields anterior and posterior.      Abdominal: Abdomen is soft. Bowel sounds are normal. He exhibits no distension. There is no abdominal tenderness.   Abdomen is soft, nontender, no peritoneal signs. Tolerating PO fluids.      No right CVA tenderness.  No left CVA tenderness. There is no rebound, no guarding, no tenderness at McBurney's point and negative López's sign. negative psoas sign and negative Rovsing's sign  Musculoskeletal:         General: No tenderness or edema. Normal range of motion.      Cervical back: Normal range of motion and neck supple.     Lymphadenopathy:     He has no cervical adenopathy.   Neurological: He is alert and oriented to person, place, and time. He has normal strength. Gait normal. GCS score is 15. GCS eye subscore is 4. GCS verbal subscore is 5. GCS motor subscore is 6.   Skin: Skin is warm and dry. Capillary refill takes less than 2 seconds. No rash noted. No erythema.   Psychiatric: He has a normal mood and affect. His speech is normal and behavior is normal. Judgment and thought content normal. Cognition and memory are normal.         ED Course   Procedures  Labs Reviewed - No data to display  EKG Readings: (Independently Interpreted)   Initial Reading: No STEMI. Rhythm: Normal Sinus Rhythm. Ectopy: No Ectopy. Conduction: Normal. ST Segments: Normal ST Segments. T Waves: Normal. Clinical Impression: Normal Sinus Rhythm     ECG Results              EKG 12-lead (Chest Pain) Age >30 (In process)        Collection Time Result Time QRS Duration [facility] QTC Calculation    12/12/24 14:21:27 12/12/24 14:29:45 82 413                     In process by Interface, Lab In [facility] (12/12/24 14:29:50)                    Narrative:    Test Reason : R07.9,    Vent. Rate : 107 BPM     Atrial Rate : 107 BPM     P-R Int : 156 ms          QRS Dur :  82 ms      QT Int : 310 ms       P-R-T Axes :  74  87  46 degrees    QTcB Int : 413 ms    Sinus tachycardia  Otherwise normal ECG  When compared with ECG of 13-Nov-2023 00:06,  No significant change was found    Referred By: AAAREFERRAL SELF           Confirmed By:                       In process by Interface, Lab In [facility] (12/12/24 14:29:35)                   Narrative:    Test Reason : R07.9,    Vent. Rate : 107 BPM     Atrial Rate : 107 BPM     P-R Int : 156 ms          QRS Dur :  82 ms      QT Int : 310 ms       P-R-T Axes :  74  87  46 degrees    QTcB Int : 413 ms    Sinus tachycardia  Otherwise normal ECG  When compared with ECG of 13-Nov-2023 00:06,  No significant change was found    Referred By: AAAREFERRAL SELF           Confirmed By:                                   Imaging Results              X-Ray Chest PA And Lateral (Final result)  Result time 12/12/24 15:14:37      Final result by Lan Sánchez MD (12/12/24 15:14:37)                   Impression:      No acute chest disease is identified.      Electronically signed by: Lan Sánchez  Date:    12/12/2024  Time:    15:14               Narrative:    EXAMINATION:  XR CHEST PA AND LATERAL    CLINICAL HISTORY:  , Cough, unspecified.    COMPARISON:  November 13, 2023    FINDINGS:  No alveolar consolidation, effusion, or pneumothorax is seen.   The thoracic aorta is normal  cardiac silhouette, central pulmonary vessels and mediastinum are normal in size and are grossly unremarkable.   visualized osseous structures are grossly unremarkable.                                       Medications   benzonatate capsule 100 mg (100 mg Oral Given 12/12/24 1507)   acetaminophen tablet 1,000 mg (1,000 mg Oral Given 12/12/24 1508)     Medical Decision Making  Pneumonia, Asthma exacerbation, COPD exacerbation, Pericardial Effusion,  Hear Failure, Pulmonary Emboli, Pleural effusion, malignancy, among others    Cough x 1 week, denies fever. PMH diabetes, hypertension, hyperlipidemia  Chest Xray:no acute abnormalities  Patient is nontoxic appearing, no acute distress, stable vital signs.     ED precautions discussed.Wash hands often, Warm salt water gargles, Throat lozenges  Rotate tylenol and Ibuprofen while awake, Soft foods, Increase water intake   If you smoke, try to quit.   Drink lots of fluids like water, juice, or broth. This will help replace any fluids lost if you have a runny nose or fever. Warm tea or soup can help soothe a sore throat.  If the air in your home feels dry, use a cool mist humidifier. This can help a stuffy nose and make it easier to breathe.  You can also use saline nose drops to relieve stuffiness.  If you decide to take over-the-counter cough or cold medicines, follow the directions on the label carefully. Be sure you do not take more than 1 medicine that contains acetaminophen. Also, if you have a heart problem or high blood pressure, check with your doctor before you take any of these medicines.  Wash your hands often. Cough or sneeze into a tissue or your elbow instead of your hands. This will help keep others healthy.  The patient is resting comfortably in no acute distress.  hemodynamically stable and is without objective evidence for acute process requiring urgent intervention or hospitalization. I provided counseling to patient with regard to condition, the treatment plan, specific conditions for return, and the importance of follow up. Detailed written and verbal instructions provided to patient and he expressed a verbal understanding of the discharge instructions and overall management plan. Reiterated the importance of medication administration and safety and advised patient to follow up with primary care provider in 3-5 days or sooner if needed. Answered questions at this time. The patient is stable for  discharge.       Amount and/or Complexity of Data Reviewed  Radiology: ordered. Decision-making details documented in ED Course.    Risk  OTC drugs.  Prescription drug management.               ED Course as of 12/12/24 1526   Thu Dec 12, 2024   1520 X-Ray Chest PA And Lateral  Impression:     No acute chest disease is identified.   [RQ]      ED Course User Index  [RQ] Arely Felix FNP                           Clinical Impression:  Final diagnoses:  [R07.9] Chest pain  [R05.9] Cough  [J20.9] Acute bronchitis, unspecified organism (Primary)  [J06.9] Viral URI with cough          ED Disposition Condition    Discharge Stable          ED Prescriptions       Medication Sig Dispense Start Date End Date Auth. Provider    benzonatate (TESSALON) 100 MG capsule Take 1 capsule (100 mg total) by mouth 3 (three) times daily as needed. 20 capsule 12/12/2024 12/22/2024 Arely Felix FNP    guaiFENesin (MUCINEX) 600 mg 12 hr tablet Take 2 tablets (1,200 mg total) by mouth 2 (two) times daily. for 10 days 40 tablet 12/12/2024 12/22/2024 Arely Felix FNP          Follow-up Information       Follow up With Specialties Details Why Contact Info    Davey Reyes MD Internal Medicine In 3 days  [address] [facility] [address]  [phone]      [facility] Emergency Medicine  If symptoms worsen [address] [facility] [id number] [phone]             Arely Felix FNP  12/12/24 1525

## 2025-01-28 DIAGNOSIS — Z79.4 TYPE 2 DIABETES MELLITUS WITH OTHER SPECIFIED COMPLICATION, WITH LONG-TERM CURRENT USE OF INSULIN: ICD-10-CM

## 2025-01-28 DIAGNOSIS — E11.69 TYPE 2 DIABETES MELLITUS WITH OTHER SPECIFIED COMPLICATION, WITH LONG-TERM CURRENT USE OF INSULIN: ICD-10-CM

## 2025-01-28 RX ORDER — METFORMIN HYDROCHLORIDE 1000 MG/1
1000 TABLET ORAL 2 TIMES DAILY
Qty: 60 TABLET | Refills: 0 | Status: SHIPPED | OUTPATIENT
Start: 2025-01-28 | End: 2025-03-29

## 2025-01-28 NOTE — TELEPHONE ENCOUNTER
----- Message from Brittny sent at 1/27/2025  1:52 PM CST -----  Regarding: Dr Reyes  Caller is:  (frida) Patient       Provider:Dr Reyes    Last Visit:9/19/24    Next Visit:2/13/25    Reason for Call: Need refill on Metformin            Preferred Phone Number: [phone]

## 2025-03-06 ENCOUNTER — OFFICE VISIT (OUTPATIENT)
Dept: INTERNAL MEDICINE | Facility: CLINIC | Age: 40
End: 2025-03-06
Payer: COMMERCIAL

## 2025-03-06 VITALS
TEMPERATURE: 99 F | OXYGEN SATURATION: 99 % | HEART RATE: 88 BPM | SYSTOLIC BLOOD PRESSURE: 130 MMHG | HEIGHT: 69 IN | DIASTOLIC BLOOD PRESSURE: 88 MMHG | BODY MASS INDEX: 44.4 KG/M2 | WEIGHT: 299.81 LBS | RESPIRATION RATE: 20 BRPM

## 2025-03-06 DIAGNOSIS — E08.00 DIABETES MELLITUS DUE TO UNDERLYING CONDITION WITH HYPEROSMOLARITY WITHOUT COMA, WITH LONG-TERM CURRENT USE OF INSULIN: Primary | ICD-10-CM

## 2025-03-06 DIAGNOSIS — Z79.4 DIABETES MELLITUS DUE TO UNDERLYING CONDITION WITH HYPEROSMOLARITY WITHOUT COMA, WITH LONG-TERM CURRENT USE OF INSULIN: Primary | ICD-10-CM

## 2025-03-06 DIAGNOSIS — E11.00 TYPE 2 DIABETES MELLITUS WITH HYPEROSMOLARITY WITHOUT COMA, UNSPECIFIED WHETHER LONG TERM INSULIN USE: Primary | ICD-10-CM

## 2025-03-06 LAB — HBA1C MFR BLD: 14.2 %

## 2025-03-06 PROCEDURE — 99214 OFFICE O/P EST MOD 30 MIN: CPT | Mod: PBBFAC | Performed by: INTERNAL MEDICINE

## 2025-03-06 PROCEDURE — 83036 HEMOGLOBIN GLYCOSYLATED A1C: CPT | Mod: PBBFAC | Performed by: INTERNAL MEDICINE

## 2025-03-06 RX ORDER — LANCETS 33 GAUGE
100 EACH MISCELLANEOUS 3 TIMES DAILY
Qty: 100 EACH | Refills: 5 | Status: SHIPPED | OUTPATIENT
Start: 2025-03-06 | End: 2025-09-02

## 2025-03-06 RX ORDER — INSULIN GLARGINE 100 [IU]/ML
50 INJECTION, SOLUTION SUBCUTANEOUS NIGHTLY
Qty: 15 ML | Refills: 11 | Status: SHIPPED | OUTPATIENT
Start: 2025-03-06 | End: 2026-03-06

## 2025-03-06 RX ORDER — INSULIN LISPRO 100 [IU]/ML
16 INJECTION, SOLUTION INTRAVENOUS; SUBCUTANEOUS
Qty: 14.4 ML | Refills: 11 | Status: SHIPPED | OUTPATIENT
Start: 2025-03-06 | End: 2026-03-06

## 2025-03-06 NOTE — PROGRESS NOTES
"  [facility]  [address] [facility] [address]      PATIENT NAME: Rick Coronado  : 1985  DATE: 3/6/25  MRN: [id number]      Billing Provider: Davey Reyes MD  Level of Service:   Patient PCP Information       Provider PCP Type    Davey Reyes MD General            Reason for Visit / Chief Complaint: Diabetes (DM f/u) and GI Problem (C/o GI virus for the past couple of days)       History of Present Illness / Problem Focused Workflow     Rick Coronado presents to the clinic with Diabetes (DM f/u) and GI Problem (C/o GI virus for the past couple of days)     Patient was last seen on . States that he has a gi virus from his child. Also feels thirsty. States his vision is blurry. Has not been taking all his medications. Takes 50 units of lantus at sleep. Has not been taking the short acting. Was not taking the metformin. A1c also went up.  I strongly advised that he needs to call the clinic if he is out of meds. He voiced understanding.       Review of Systems     10-point ROS performed and negative except as above.      Medical / Social / Family History     Past Medical History:   Diagnosis Date    Diabetes mellitus     Hyperlipidemia     Hypertension        History reviewed. No pertinent surgical history.    Social History    reports that he has never smoked. He has never been exposed to tobacco smoke. He has never used smokeless tobacco. He reports that he does not drink alcohol and does not use drugs.    Family History  's family history includes Diabetes in his mother.    Medications and Allergies     Medications  Outpatient Medications Marked as Taking for the 3/6/25 encounter (Office Visit) with Davey Reyes MD   Medication Sig Dispense Refill    BD ELSA 2ND GEN PEN NEEDLE 32 gauge x " Ndle USE WITH LANTUS DAILY 100 each 3    blood sugar diagnostic Strp 100 each by Misc.(Non-Drug; Combo Route) route " once daily. 100 each 3    insulin glargine U-100, Lantus, (LANTUS SOLOSTAR U-100 INSULIN) 100 unit/mL (3 mL) InPn pen Inject 50 Units into the skin every evening. 15 mL 11    insulin lispro (HUMALOG KWIKPEN INSULIN) 100 unit/mL pen Inject 16 Units into the skin 3 (three) times daily before meals. 14.4 mL 11    losartan (COZAAR) 25 MG tablet Take 1 tablet (25 mg total) by mouth once daily. 90 tablet 1    metFORMIN (GLUCOPHAGE) 1000 MG tablet Take 1 tablet (1,000 mg total) by mouth 2 (two) times daily. (Patient taking differently: Take 1,000 mg by mouth Daily.) 60 tablet 0    ondansetron (ZOFRAN-ODT) 4 MG TbDL Take 1 tablet (4 mg total) by mouth every 8 (eight) hours as needed (nausea). 14 tablet 0    TRUE METRIX GLUCOSE METER Misc USE AS DIRECTED TWICE DAILY      TRUE METRIX GLUCOSE TEST STRIP Strp USE TO TEST THREE TIMES DAILY 100 strip 5    TRUEPLUS LANCETS 33 gauge Misc USE TO TEST THREE TIMES DAILY 100 each 5       Allergies  Review of patient's allergies indicates:  No Known Allergies    Physical Examination     Vitals:    03/06/25 1447   BP: 130/88   Pulse:    Resp:    Temp:      General: AAOx3, NAD, alert and cooperative  HEENT: EOMI, normal conjunctiva  Neck: no LAD, no JVD, supple, no masses or thyromegaly  CVS: S1/S2 nml, RRR, no murmurs, rubs or gallops, no carotid bruits  Resp: CTA B/L, no rhonchi, rales, or wheezing  GI: no TTP, not distended, BS+ obese  : no CVA tenderness  Skin: not jaundiced, warm, no rashes  Musculoskeletal: normal ROM, no joint tenderness, normal muscular development  Extremities: no peripheral edema, peripheral pulses intact  Neuro: CN II-XII grossly intact, strength and sensation symmetric and intact throughout, no focal neurological deficits        Results   Reviewed recent labs.        Assessment and Plan (including Health Maintenance)     Plan:   Rick was seen today for diabetes and gi problem.    Diagnoses and all orders for this visit:    Diabetes mellitus due to  underlying condition with hyperosmolarity without coma, with long-term current use of insulin  -     Hemoglobin A1C, POCT      Uncontrolled diabetes mellitus E11.65  POC A1c 12.7 on 8/22/24  Currently on metformin 1000 mg bid, pioglitazone 15 mg qd.  Lantus 50 units qhs  Lispro 16 units tidac  I had a very lengthy conversation with nixonn about the severity of his diabetes. Advised him that he has to be compliant with his medications and diet. Otherwise, uncontrolled diabetes can lead to renal failure, amputations, cva, cad, coma and death. He voiced understanding to me.   crestor 10 mg at bedtime.  Follow ADA diet.  Will refer back to nutrition.  Avoid soda, simple sweets, and limit rice/pasta/bread/starches and consume brown options when possible.   Has seen diabetic education.  Maintain healthy weight with BMI goal <30.   Perform aerobic exercise for 150 minutes per week (or 5 days a week for 30 minutes each day).   Examine and wash feet daily.   Eye exam: seeing eye doctor today   Foot exam: done on 3/2/23  Awaiting endocrine appt.  Referred to dietician, nutritional services, and endocrine     3. Morbid obesity with BMI of 45.0-49.9, adult E66.01  BMI 44 Goal BMI <30.  Aerobic exercise 150 minutes per week.  Avoid soda, simple sugars, sweets, excessive rice, pasta, potatoes or bread.   Choose brown options when available and portion control.  Limit fast foods and fried foods.   Choose complex carbs in moderation (ex: green, leafy vegetables, beans, oatmeal).  Eat vegetables with lean meats daily.   Consider permanent healthy lifestyle changes.  Unable to tolerate victoza    4. HTN (hypertension) I10  BP today 117/75  Continue losartan to 12.5 mg qd  Follow a low sodium (less than 2 grams of sodium per day), DASH diet.   Continue medications as prescribed.  Monitor blood pressure and report any consistent values greater than 140/90 and keep a log.  Maintain healthy weight with a BMI goal of <30.   Aerobic  exercise for 150 minutes per week (or 5 days a week for 30 minutes each day).    5. GERD (gastroesophageal reflux disease) K21.9  Continue PPI  Avoid spicy, acidic, fried food and alcohol.   Eat 2-3 hours before bed.  Avoid tight fitting clothes and chew food thoroughly.   Reduce caffeine intake, avoid soda.   Take meds as prescribed.        Routine labs.  F/u in 2 month to make sure patient is taking meds      Health Maintenance Due   Topic Date Due    Diabetic Eye Exam  03/02/2024    Influenza Vaccine (1) Never done    COVID-19 Vaccine (1 - 2024-25 season) Never done         Health Maintenance Topics with due status: Not Due       Topic Last Completion Date    TETANUS VACCINE 01/11/2019    Diabetes Urine Screening 05/30/2024    Foot Exam 05/30/2024    Lipid Panel 05/30/2024    Hemoglobin A1c 03/06/2025    RSV Vaccine (Age 60+ and Pregnant patients) Not Due       No follow-ups on file.     No future appointments.         Signature:  Davey Reyes MD  [facility]  [address] [facility] [address]    Date of encounter: 3/6/25

## 2025-03-11 ENCOUNTER — OFFICE VISIT (OUTPATIENT)
Dept: ENDOCRINOLOGY | Facility: CLINIC | Age: 40
End: 2025-03-11
Payer: COMMERCIAL

## 2025-03-11 ENCOUNTER — LAB VISIT (OUTPATIENT)
Dept: LAB | Facility: HOSPITAL | Age: 40
End: 2025-03-11
Attending: NURSE PRACTITIONER
Payer: COMMERCIAL

## 2025-03-11 VITALS
RESPIRATION RATE: 18 BRPM | HEART RATE: 86 BPM | DIASTOLIC BLOOD PRESSURE: 79 MMHG | TEMPERATURE: 98 F | SYSTOLIC BLOOD PRESSURE: 117 MMHG | HEIGHT: 69 IN | WEIGHT: 299.19 LBS | BODY MASS INDEX: 44.31 KG/M2

## 2025-03-11 DIAGNOSIS — E08.00 DIABETES MELLITUS DUE TO UNDERLYING CONDITION WITH HYPEROSMOLARITY WITHOUT COMA, WITH LONG-TERM CURRENT USE OF INSULIN: ICD-10-CM

## 2025-03-11 DIAGNOSIS — E11.65 UNCONTROLLED TYPE 2 DIABETES MELLITUS WITH HYPERGLYCEMIA: Primary | ICD-10-CM

## 2025-03-11 DIAGNOSIS — Z79.4 DIABETES MELLITUS DUE TO UNDERLYING CONDITION WITH HYPEROSMOLARITY WITHOUT COMA, WITH LONG-TERM CURRENT USE OF INSULIN: ICD-10-CM

## 2025-03-11 DIAGNOSIS — E11.65 UNCONTROLLED TYPE 2 DIABETES MELLITUS WITH HYPERGLYCEMIA: ICD-10-CM

## 2025-03-11 LAB
ALBUMIN SERPL-MCNC: 3.6 G/DL (ref 3.5–5)
ALBUMIN SERPL-MCNC: 3.7 G/DL (ref 3.5–5)
ALBUMIN/GLOB SERPL: 0.7 RATIO (ref 1.1–2)
ALBUMIN/GLOB SERPL: 0.7 RATIO (ref 1.1–2)
ALP SERPL-CCNC: 62 UNIT/L (ref 40–150)
ALP SERPL-CCNC: 63 UNIT/L (ref 40–150)
ALT SERPL-CCNC: 29 UNIT/L (ref 0–55)
ALT SERPL-CCNC: 30 UNIT/L (ref 0–55)
ANION GAP SERPL CALC-SCNC: 10 MEQ/L
ANION GAP SERPL CALC-SCNC: 11 MEQ/L
AST SERPL-CCNC: 17 UNIT/L (ref 5–34)
AST SERPL-CCNC: 17 UNIT/L (ref 5–34)
BASOPHILS # BLD AUTO: 0.05 X10(3)/MCL
BASOPHILS NFR BLD AUTO: 0.6 %
BILIRUB SERPL-MCNC: 0.3 MG/DL
BILIRUB SERPL-MCNC: 0.3 MG/DL
BUN SERPL-MCNC: 10.9 MG/DL (ref 8.9–20.6)
BUN SERPL-MCNC: 11.1 MG/DL (ref 8.9–20.6)
CALCIUM SERPL-MCNC: 9.3 MG/DL (ref 8.4–10.2)
CALCIUM SERPL-MCNC: 9.5 MG/DL (ref 8.4–10.2)
CHLORIDE SERPL-SCNC: 101 MMOL/L (ref 98–107)
CHLORIDE SERPL-SCNC: 101 MMOL/L (ref 98–107)
CHOLEST SERPL-MCNC: 173 MG/DL
CHOLEST/HDLC SERPL: 5 {RATIO} (ref 0–5)
CO2 SERPL-SCNC: 22 MMOL/L (ref 22–29)
CO2 SERPL-SCNC: 22 MMOL/L (ref 22–29)
CREAT SERPL-MCNC: 0.8 MG/DL (ref 0.72–1.25)
CREAT SERPL-MCNC: 0.81 MG/DL (ref 0.72–1.25)
CREAT/UREA NIT SERPL: 13
CREAT/UREA NIT SERPL: 14
EOSINOPHIL # BLD AUTO: 0.1 X10(3)/MCL (ref 0–0.9)
EOSINOPHIL NFR BLD AUTO: 1.2 %
ERYTHROCYTE [DISTWIDTH] IN BLOOD BY AUTOMATED COUNT: 12.5 % (ref 11.5–17)
GFR SERPLBLD CREATININE-BSD FMLA CKD-EPI: >60 ML/MIN/1.73/M2
GFR SERPLBLD CREATININE-BSD FMLA CKD-EPI: >60 ML/MIN/1.73/M2
GLOBULIN SER-MCNC: 5.2 GM/DL (ref 2.4–3.5)
GLOBULIN SER-MCNC: 5.3 GM/DL (ref 2.4–3.5)
GLUCOSE SERPL-MCNC: 299 MG/DL (ref 74–100)
GLUCOSE SERPL-MCNC: 303 MG/DL (ref 74–100)
HCT VFR BLD AUTO: 41.4 % (ref 42–52)
HDLC SERPL-MCNC: 34 MG/DL (ref 35–60)
HGB BLD-MCNC: 14 G/DL (ref 14–18)
IMM GRANULOCYTES # BLD AUTO: 0.04 X10(3)/MCL (ref 0–0.04)
IMM GRANULOCYTES NFR BLD AUTO: 0.5 %
LDLC SERPL CALC-MCNC: 112 MG/DL (ref 50–140)
LYMPHOCYTES # BLD AUTO: 3.81 X10(3)/MCL (ref 0.6–4.6)
LYMPHOCYTES NFR BLD AUTO: 44.5 %
MCH RBC QN AUTO: 29 PG (ref 27–31)
MCHC RBC AUTO-ENTMCNC: 33.8 G/DL (ref 33–36)
MCV RBC AUTO: 85.9 FL (ref 80–94)
MONOCYTES # BLD AUTO: 0.59 X10(3)/MCL (ref 0.1–1.3)
MONOCYTES NFR BLD AUTO: 6.9 %
NEUTROPHILS # BLD AUTO: 3.97 X10(3)/MCL (ref 2.1–9.2)
NEUTROPHILS NFR BLD AUTO: 46.3 %
NRBC BLD AUTO-RTO: 0 %
PLATELET # BLD AUTO: 360 X10(3)/MCL (ref 130–400)
PMV BLD AUTO: 9.9 FL (ref 7.4–10.4)
POTASSIUM SERPL-SCNC: 4.3 MMOL/L (ref 3.5–5.1)
POTASSIUM SERPL-SCNC: 4.3 MMOL/L (ref 3.5–5.1)
PROT SERPL-MCNC: 8.9 GM/DL (ref 6.4–8.3)
PROT SERPL-MCNC: 8.9 GM/DL (ref 6.4–8.3)
RBC # BLD AUTO: 4.82 X10(6)/MCL (ref 4.7–6.1)
SODIUM SERPL-SCNC: 133 MMOL/L (ref 136–145)
SODIUM SERPL-SCNC: 134 MMOL/L (ref 136–145)
TRIGL SERPL-MCNC: 134 MG/DL (ref 34–140)
TSH SERPL-ACNC: 1.73 UIU/ML (ref 0.35–4.94)
VLDLC SERPL CALC-MCNC: 27 MG/DL
WBC # BLD AUTO: 8.56 X10(3)/MCL (ref 4.5–11.5)

## 2025-03-11 PROCEDURE — 84681 ASSAY OF C-PEPTIDE: CPT

## 2025-03-11 PROCEDURE — 99215 OFFICE O/P EST HI 40 MIN: CPT | Mod: PBBFAC | Performed by: NURSE PRACTITIONER

## 2025-03-11 PROCEDURE — 80053 COMPREHEN METABOLIC PANEL: CPT | Mod: 91

## 2025-03-11 PROCEDURE — 84443 ASSAY THYROID STIM HORMONE: CPT

## 2025-03-11 PROCEDURE — 4010F ACE/ARB THERAPY RXD/TAKEN: CPT | Mod: CPTII,,, | Performed by: NURSE PRACTITIONER

## 2025-03-11 PROCEDURE — 80061 LIPID PANEL: CPT

## 2025-03-11 PROCEDURE — 36415 COLL VENOUS BLD VENIPUNCTURE: CPT

## 2025-03-11 PROCEDURE — 85025 COMPLETE CBC W/AUTO DIFF WBC: CPT

## 2025-03-11 PROCEDURE — 99204 OFFICE O/P NEW MOD 45 MIN: CPT | Mod: S$PBB,,, | Performed by: NURSE PRACTITIONER

## 2025-03-11 PROCEDURE — 1160F RVW MEDS BY RX/DR IN RCRD: CPT | Mod: CPTII,,, | Performed by: NURSE PRACTITIONER

## 2025-03-11 PROCEDURE — 80053 COMPREHEN METABOLIC PANEL: CPT

## 2025-03-11 PROCEDURE — 3008F BODY MASS INDEX DOCD: CPT | Mod: CPTII,,, | Performed by: NURSE PRACTITIONER

## 2025-03-11 PROCEDURE — 3074F SYST BP LT 130 MM HG: CPT | Mod: CPTII,,, | Performed by: NURSE PRACTITIONER

## 2025-03-11 PROCEDURE — 3046F HEMOGLOBIN A1C LEVEL >9.0%: CPT | Mod: CPTII,,, | Performed by: NURSE PRACTITIONER

## 2025-03-11 PROCEDURE — 3078F DIAST BP <80 MM HG: CPT | Mod: CPTII,,, | Performed by: NURSE PRACTITIONER

## 2025-03-11 PROCEDURE — 86341 ISLET CELL ANTIBODY: CPT

## 2025-03-11 PROCEDURE — 1159F MED LIST DOCD IN RCRD: CPT | Mod: CPTII,,, | Performed by: NURSE PRACTITIONER

## 2025-03-11 RX ORDER — BLOOD-GLUCOSE SENSOR
EACH MISCELLANEOUS
Qty: 3 EACH | Refills: 11 | Status: SHIPPED | OUTPATIENT
Start: 2025-03-11

## 2025-03-11 NOTE — PROGRESS NOTES
Subjective     Patient ID: Rick Coronado is a 39 y.o. male.    Chief Complaint: TYPE 2 DIABETES    Endocrine clinic note 03/11/2025:  39-year-old male scheduled today endocrine clinic as new patient referral for history of uncontrolled type 2 diabetes.  Uncontrolled type 2 diabetes recent A1c 14.2.  Patient has been on multiple medications with no improvement in A1c over the last year.  Patient reports he is taking metformin only has side effects when initially starting such as diarrhea improves after taking metformin.  Patient states in the past he took Jardiance but threw up the 1st 2 weeks time Jardiance and then stopped the medication.  Patient is on Lantus 50 units and states that he recently started NovoLog 16 units with meals just recently.  Patient states he is making dietary changes and has started cutting out sugary beverages and has been eating rice and potatoes but has been working on cutting out rice and potatoes.  Instructed the patient today will run a C-peptide and ensure that patient has a type 2 diabetic before changing his regimen.  Patient is actually a CGM unsure of the cost but his insurance due to high co-pay today sample Dexcom was started on the patient and Dexcom prescription sent to [facility] pharmacy to check for price.      Review of Systems   Constitutional:  Negative for activity change, appetite change, chills and fatigue.   HENT: Negative.  Negative for dental problem, hearing loss, rhinorrhea, sneezing and goiter.    Eyes: Negative.  Negative for photophobia and visual disturbance.   Respiratory: Negative.  Negative for cough, chest tightness and shortness of breath.    Cardiovascular:  Negative for chest pain, palpitations and leg swelling.   Gastrointestinal: Negative.  Negative for abdominal distention, abdominal pain, change in bowel habit, constipation, diarrhea, nausea and vomiting.   Endocrine: Negative.  Negative for cold intolerance, heat intolerance, polydipsia, polyphagia  and polyuria.   Genitourinary: Negative.  Negative for difficulty urinating and erectile dysfunction.   Musculoskeletal:  Negative for back pain, joint swelling, leg pain, myalgias and joint deformity.   Integumentary:  Negative for color change, pallor and rash. Negative.   Allergic/Immunologic: Negative.  Negative for environmental allergies, food allergies and frequent infections.   Neurological: Negative.  Negative for tremors, syncope, headaches and coordination difficulties.   Hematological: Negative.  Does not bruise/bleed easily.   Psychiatric/Behavioral:  Negative for agitation and behavioral problems. The patient is not nervous/anxious and is not hyperactive.           Objective     Physical Exam  Constitutional:       General: He is not in acute distress.     Appearance: Normal appearance. He is normal weight. He is not ill-appearing.   HENT:      Head: Normocephalic.      Right Ear: External ear normal.      Left Ear: External ear normal.      Nose: No congestion or rhinorrhea.      Mouth/Throat:      Mouth: Mucous membranes are moist.      Pharynx: Oropharynx is clear.   Eyes:      Conjunctiva/sclera: Conjunctivae normal.      Pupils: Pupils are equal, round, and reactive to light.   Neck:      Thyroid: No thyroid mass, thyromegaly or thyroid tenderness.   Cardiovascular:      Rate and Rhythm: Normal rate and regular rhythm.      Pulses: Normal pulses.      Heart sounds: Normal heart sounds. No murmur heard.  Pulmonary:      Effort: Pulmonary effort is normal. No respiratory distress.      Breath sounds: Normal breath sounds.   Abdominal:      General: Abdomen is flat. Bowel sounds are normal. There is no distension.      Palpations: Abdomen is soft.      Tenderness: There is no abdominal tenderness.   Genitourinary:     Testes: Normal.   Musculoskeletal:         General: Normal range of motion.      Cervical back: Normal range of motion and neck supple.      Right lower leg: No edema.      Left lower  leg: No edema.   Feet:      Right foot:      Skin integrity: Skin integrity normal.      Left foot:      Skin integrity: Skin integrity normal.   Lymphadenopathy:      Cervical: No cervical adenopathy.   Skin:     General: Skin is warm and dry.      Coloration: Skin is not jaundiced or pale.   Neurological:      General: No focal deficit present.      Mental Status: He is alert and oriented to person, place, and time.      Motor: No weakness.      Coordination: Coordination normal.      Gait: Gait normal.   Psychiatric:         Mood and Affect: Mood normal.         Behavior: Behavior normal.         Thought Content: Thought content normal.         Judgment: Judgment normal.            Assessment and Plan     1. Uncontrolled type 2 diabetes mellitus with hyperglycemia  Current A1C 14.2  A1C goal <7.0   Medications: Metformin 1000 mg BID (not taking) Actos 15 mg (not taking)  Lantus 50 units once a day, Humalog 16 units TID (not taking) Changes: C-peptid today then adjust regimen, continue metformin Lantus and Humalog at this time   Yearly Diabetic Foot Exam: 05/03/2024             Component  Ref Range & Units (hover) 5 d ago  (3/6/25) 6 mo ago  (8/22/24) 9 mo ago  (5/30/24) 1 yr ago  (11/9/23) 1 yr ago  (9/28/23) 1 yr ago  (6/8/23) 2 yr ago  (8/4/22)   Hemoglobin A1C, POC 14.2 12.6% >15.0% 14.3 14.3 12.8 1      -     Comprehensive Metabolic Panel; Future; Expected date: 03/11/2025  -     C-Peptide; Future; Expected date: 03/11/2025  -     GAD65 Ab, Serum; Future; Expected date: 03/11/2025  -     blood-glucose sensor (DEXCOM G7 SENSOR) Elvie; Use every 10 days as directed  Dispense: 3 each; Refill: 11  -     Ambulatory referral/consult to Diabetes Education    Has a limited ND in her sudden male see mixed got a different lobe  Dispensed Days Supply Quantity Provider Pharmacy    pioglitazone 15 mg tablet 05/30/2024 90 90 each Davey Reyes MD [facility] an...       Dispensed Days Supply Quantity Provider  Pharmacy    METFORMIN 1000MG TABLETS 02/11/2025 30 60 each Davey Reyes MD [facility]...   metformin 1,000 mg tablet 09/05/2024 30 60 each Davey Reyes MD [facility] an...   metformin 1,000 mg tablet 05/30/2024 30 60 each Davey Reyes MD [facility] an...      Dispensed Days Supply Quantity Provider Pharmacy   insulin lispro (U-100) 100 unit/mL subcutaneous pen 03/06/2025 31 15 mL Davey Reyes MD [facility] an...   Humalog KwikPen (U-100) Insulin 100 unit/mL subcutaneous 01/27/2025 31 15 mL Davey Reyes MD [facility] an...   Humalog KwikPen (U-100) Insulin 100 unit/mL subcutaneous 12/16/2024 31 15 mL Davey Reyes MD [facility] an...   Humalog KwikPen (U-100) Insulin 100 unit/mL subcutaneous 11/01/2024 31 15 mL Davey Reyes MD [facility] an...   Humalog KwikPen (U-100) Insulin 100 unit/mL subcutaneous 08/22/2024 31 15 mL Davey Reyes MD [facility] an...      Dispensed Days Supply Quantity Provider Pharmacy   Lantus Solostar U-100 Insulin 100 unit/mL (3 mL) subcutaneous pen 03/06/2025 30 15 mL Davey Reyes MD [facility] an...   Lantus Solostar U-100 Insulin 100 unit/mL (3 mL) subcutaneous pen 01/27/2025 30 15 mL Davey Reyes MD [facility] an...   Lantus Solostar U-100 Insulin 100 unit/mL (3 mL) subcutaneous pen 12/16/2024 30 15 mL Davey Reyes MD [facility] an...   Lantus Solostar U-100 Insulin 100 unit/mL (3 mL) subcutaneous pen 11/01/2024 30 15 mL Davey Reyes MD [facility] an...   Lantus Solostar U-100 Insulin 100 unit/mL (3 mL) subcutaneous pen 09/19/2024 30 15 mL Davey Reyes MD [facility] an...       I spent a total of 45 minutes on the day of the visit.  This includes face to face time and non-face to face time preparing to see the patient (eg, review of tests), obtaining and/or reviewing separately  obtained history, documenting clinical information in the electronic or other health record, independently interpreting results and communicating results to the patient/family/caregiver, or care coordinator.        Follow up in about 3 months (around 6/11/2025) for Type 2 diabetes.

## 2025-03-13 ENCOUNTER — RESULTS FOLLOW-UP (OUTPATIENT)
Dept: ENDOCRINOLOGY | Facility: CLINIC | Age: 40
End: 2025-03-13

## 2025-03-13 DIAGNOSIS — E11.65 UNCONTROLLED TYPE 2 DIABETES MELLITUS WITH HYPERGLYCEMIA: Primary | ICD-10-CM

## 2025-03-13 LAB — C PEPTIDE P FAST SERPL-MCNC: 3.5 NG/ML (ref 1.1–4.4)

## 2025-03-18 LAB — GAD65 AB SER-SCNC: 0.01 NMOL/L

## 2025-04-10 ENCOUNTER — TELEPHONE (OUTPATIENT)
Dept: PHARMACY | Facility: CLINIC | Age: 40
End: 2025-04-10
Payer: COMMERCIAL

## 2025-04-10 ENCOUNTER — OFFICE VISIT (OUTPATIENT)
Dept: INTERNAL MEDICINE | Facility: CLINIC | Age: 40
End: 2025-04-10
Payer: COMMERCIAL

## 2025-04-10 ENCOUNTER — CLINICAL SUPPORT (OUTPATIENT)
Dept: DIABETES | Facility: CLINIC | Age: 40
End: 2025-04-10
Payer: COMMERCIAL

## 2025-04-10 VITALS
OXYGEN SATURATION: 98 % | RESPIRATION RATE: 16 BRPM | BODY MASS INDEX: 45.03 KG/M2 | WEIGHT: 304 LBS | DIASTOLIC BLOOD PRESSURE: 76 MMHG | HEART RATE: 86 BPM | TEMPERATURE: 98 F | HEIGHT: 69 IN | SYSTOLIC BLOOD PRESSURE: 131 MMHG

## 2025-04-10 DIAGNOSIS — E11.10 DM (DIABETES MELLITUS) TYPE 2, UNCONTROLLED, WITH KETOACIDOSIS: Primary | ICD-10-CM

## 2025-04-10 DIAGNOSIS — E11.65 UNCONTROLLED TYPE 2 DIABETES MELLITUS WITH HYPERGLYCEMIA: Primary | ICD-10-CM

## 2025-04-10 DIAGNOSIS — E11.9 TYPE 2 DIABETES MELLITUS WITHOUT COMPLICATION, WITH LONG-TERM CURRENT USE OF INSULIN: Primary | ICD-10-CM

## 2025-04-10 DIAGNOSIS — Z79.4 TYPE 2 DIABETES MELLITUS WITHOUT COMPLICATION, WITH LONG-TERM CURRENT USE OF INSULIN: Primary | ICD-10-CM

## 2025-04-10 DIAGNOSIS — E08.65 DIABETES MELLITUS DUE TO UNDERLYING CONDITION, UNCONTROLLED, WITH HYPERGLYCEMIA: Primary | ICD-10-CM

## 2025-04-10 DIAGNOSIS — B35.1 ONYCHOMYCOSIS: ICD-10-CM

## 2025-04-10 DIAGNOSIS — E08.00 DIABETES MELLITUS DUE TO UNDERLYING CONDITION WITH HYPEROSMOLARITY WITHOUT COMA, UNSPECIFIED WHETHER LONG TERM INSULIN USE: Primary | ICD-10-CM

## 2025-04-10 PROCEDURE — G0108 DIAB MANAGE TRN  PER INDIV: HCPCS | Mod: PBBFAC | Performed by: DIETITIAN, REGISTERED

## 2025-04-10 PROCEDURE — 99211 OFF/OP EST MAY X REQ PHY/QHP: CPT | Mod: PBBFAC | Performed by: DIETITIAN, REGISTERED

## 2025-04-10 PROCEDURE — 99215 OFFICE O/P EST HI 40 MIN: CPT | Mod: PBBFAC,27 | Performed by: INTERNAL MEDICINE

## 2025-04-10 RX ORDER — TERBINAFINE HYDROCHLORIDE 250 MG/1
250 TABLET ORAL DAILY
Qty: 30 TABLET | Refills: 2 | Status: SHIPPED | OUTPATIENT
Start: 2025-04-10 | End: 2025-07-09

## 2025-04-10 RX ORDER — BLOOD-GLUCOSE SENSOR
1 EACH MISCELLANEOUS ONCE
Qty: 1 EACH | Refills: 0 | Status: SHIPPED | OUTPATIENT
Start: 2025-04-10 | End: 2025-04-10

## 2025-04-10 NOTE — PROGRESS NOTES
"  [facility]  [address] [address]      PATIENT NAME: Rick Coronado  : 1985  DATE: 4/10/25  MRN: [id number]      Billing Provider: Davey Reyes MD  Level of Service:   Patient PCP Information       Provider PCP Type    Davey Reyes MD General            Reason for Visit / Chief Complaint: Follow-up (Routine follow up visit. )       History of Present Illness / Problem Focused Workflow     Rick Coronado presents to the clinic with Follow-up (Routine follow up visit. )     Patient was last seen on 3/6. He continues to have uncontrolled diabetes. I was able to get him scheduled to see endocrine. Endocrine ordered cpeptide and GAD65 which were negative.     He is currently taking and januvia, lantus and humalog.    Today, recently had diabetes education this morning. States that A1c was checked there and it was 9 something. Nothing bothering him. No blurry vision.       Review of Systems     10-point ROS performed and negative except as above.      Medical / Social / Family History     Past Medical History:   Diagnosis Date    Diabetes mellitus     Hyperlipidemia     Hypertension        History reviewed. No pertinent surgical history.    Social History    reports that he has never smoked. He has never been exposed to tobacco smoke. He has never used smokeless tobacco. He reports that he does not drink alcohol and does not use drugs.    Family History  's family history includes Diabetes in his mother.    Medications and Allergies     Medications  Outpatient Medications Marked as Taking for the 4/10/25 encounter (Office Visit) with Davey Reyes MD   Medication Sig Dispense Refill    BD ELSA 2ND GEN PEN NEEDLE 32 gauge x " Ndle USE WITH LANTUS DAILY 100 each 3    blood sugar diagnostic Strp 100 each by Misc.(Non-Drug; Combo Route) route once daily. 100 each 3    insulin glargine U-100, Lantus, " (LANTUS SOLOSTAR U-100 INSULIN) 100 unit/mL (3 mL) InPn pen Inject 50 Units into the skin every evening. 15 mL 11    insulin lispro (HUMALOG KWIKPEN INSULIN) 100 unit/mL pen Inject 16 Units into the skin 3 (three) times daily before meals. 14.4 mL 11    lancets (TRUEPLUS LANCETS) 33 gauge Misc Inject 100 lancets into the skin 3 (three) times daily. 100 each 5    rosuvastatin (CRESTOR) 10 MG tablet Take 1 tablet (10 mg total) by mouth every evening. 30 tablet 3    SITagliptin phosphate (JANUVIA) 100 MG Tab Take 1 tablet (100 mg total) by mouth once daily. 90 tablet 3    TRUE METRIX GLUCOSE METER Misc USE AS DIRECTED TWICE DAILY      TRUE METRIX GLUCOSE TEST STRIP Strp USE TO TEST THREE TIMES DAILY 100 strip 5       Allergies  Review of patient's allergies indicates:  No Known Allergies    Physical Examination     Vitals:    04/10/25 1452   BP: 131/76   Pulse: 86   Resp: 16   Temp: 97.8 °F (36.6 °C)     General: AAOx3, NAD, alert and cooperative  HEENT: PERRLA, EOMI, normal conjunctiva  Neck: no LAD, no JVD, supple, no masses or thyromegaly  CVS: S1/S2 nml, RRR, no murmurs, rubs or gallops, no carotid bruits  Resp: CTA B/L, no rhonchi, rales, or wheezing  GI: no TTP, not distended, BS+  : no CVA tenderness  Skin: not jaundiced, warm, no rashes  Musculoskeletal: normal ROM, no joint tenderness, normal muscular development  Extremities: no peripheral edema, peripheral pulses intact  Protective Sensation (w/ 10 gram monofilament):  Right: Intact  Left: Intact    Visual Inspection:  Onychomycosis -  Bilateral    Pedal Pulses:   Right: Present  Left: Present    Posterior Tibialis Pulses:   Right:Present  Left: Present   Neuro: CN II-XII grossly intact, strength and sensation symmetric and intact throughout, no focal neurological deficits        Results   Reviewed recent labs.        Assessment and Plan (including Health Maintenance)     Plan:   Rick was seen today for follow-up.    Diagnoses and all orders for this  visit:    Diabetes mellitus due to underlying condition with hyperosmolarity without coma, unspecified whether long term insulin use    BMI 40.0-44.9, adult        Uncontrolled diabetes mellitus E11.65  POC A1c 14.2 on 3/6/25  Currently on metformin 1000 mg bid, Lantus 50 units qhs  Lispro 16 units tidac  I had a very lengthy conversation with nioxnn about the severity of his diabetes. Advised him that he has to be compliant with his medications and diet. Otherwise, uncontrolled diabetes can lead to renal failure, amputations, cva, cad, coma and death. He voiced understanding to me.  He has no established care with endocrine.    crestor 10 mg at bedtime.  Follow ADA diet.  Will refer back to nutrition.  Avoid soda, simple sweets, and limit rice/pasta/bread/starches and consume brown options when possible.   Saw diabetic education today  Maintain healthy weight with BMI goal <30.   Perform aerobic exercise for 150 minutes per week (or 5 days a week for 30 minutes each day).   Examine and wash feet daily.   Eye exam: ordered eye exam today  Foot exam: foot exam today     3. Morbid obesity with BMI of 45.0-49.9, adult E66.01  BMI 44 Goal BMI <30.  Aerobic exercise 150 minutes per week.  Avoid soda, simple sugars, sweets, excessive rice, pasta, potatoes or bread.   Choose brown options when available and portion control.  Limit fast foods and fried foods.   Choose complex carbs in moderation (ex: green, leafy vegetables, beans, oatmeal).  Eat vegetables with lean meats daily.   Consider permanent healthy lifestyle changes.  Unable to tolerate victoza  Will refer to bariatric    Onychomycosis  Will start terbinafine  Cmp in 4 weeks    4. HTN (hypertension) I10  BP today 117/75  Continue losartan to 12.5 mg qd  Follow a low sodium (less than 2 grams of sodium per day), DASH diet.   Continue medications as prescribed.  Monitor blood pressure and report any consistent values greater than 140/90 and keep a log.  Maintain  healthy weight with a BMI goal of <30.   Aerobic exercise for 150 minutes per week (or 5 days a week for 30 minutes each day).    5. GERD (gastroesophageal reflux disease) K21.9  Continue PPI  Avoid spicy, acidic, fried food and alcohol.   Eat 2-3 hours before bed.  Avoid tight fitting clothes and chew food thoroughly.   Reduce caffeine intake, avoid soda.   Take meds as prescribed.        Routine labs.  F/u in 3 months     Health Maintenance Due   Topic Date Due    Diabetic Eye Exam  03/02/2024    Influenza Vaccine (1) Never done    COVID-19 Vaccine (1 - 2024-25 season) Never done    Foot Exam  05/30/2025         Health Maintenance Topics with due status: Not Due       Topic Last Completion Date    TETANUS VACCINE 01/11/2019    Diabetes Urine Screening 05/30/2024    Hemoglobin A1c 03/06/2025    Lipid Panel 03/11/2025    RSV Vaccine (Age 60+ and Pregnant patients) Not Due       No follow-ups on file.     Future Appointments   Date Time Provider Department Center   5/8/2025  3:00 PM Melissa Galvan RD, [facility]MD[facility]U [facility]   6/16/2025  2:15 PM Nicole Espino, NP [facility] ENDOCR [facility]            Signature:  Davey Reyes MD  [facility]  [address] [facility] [address]    Date of encounter: 4/10/25

## 2025-04-10 NOTE — PROGRESS NOTES
Diabetes Care Specialist Progress Note  Author: Melissa Galvan RD, [facility]  Date: 4/10/2025    Intake    Program Intake  Reason for Diabetes Program Visit:: Initial Diabetes Assessment  Current diabetes risk level:: low    Current Diabetes Treatment: Insulin, Oral Medications  Oral Medication Type/Dose: Januvia 100mg  Method of insulin delivery?: Injections  Injection Type: Pens  Pen Type/Dose: Lantus 50 units and Lispro 16 units with meals    Continuous Glucose Monitoring  Patient has CGM: No (dexcom costs $300/mth)    Lab Results   Component Value Date    HGBA1C >14.0 (H) 05/30/2024               There is no height or weight on file to calculate BMI.    Lifestyle Coping Support & Clinical    Lifestyle/Coping/Support  Does anyone in your family have diabetes or does anyone in your family support you in your diabetes care?: girlfriend  Psychosocial/Coping Skills Assessment Completed: : No  Deffered due to:: Time  Area of need?: Deferred    Problem Review  Active Comorbidities: Hyperlipidemia/Dyslipidemia, Hypertension    Diabetes Self-Management Skills Assessment    Medication Skills Assessment  Patient is able to identify current diabetes medications, dosages, and appropriate timing of medications.: yes  Patient reports problems or concerns with current medication regimen.: yes  Medication regimen problems/concerns:: financial concerns  Pharmacy assistance referral placed?: Yes  Patient is  aware that some diabetes medications can cause low blood sugar?: Yes  Medication Skills Assessment Completed:: Yes  Assessment indicates:: Instruction Needed  Area of need?: Yes    Diabetes Disease Process/Treatment Options  Diabetes Disease Process/Treatment Options: Skills Assessment Completed: No  Deferred due to:: Time  Area of need?: Deferred    Nutrition/Healthy Eating  Meal Plan 24 Hour Recall - Dinner: lean meat and vegetables with 1/2 cup starch  Meal Plan 24 Hour Recall - Snack: crackers  Meal Plan 24 Hour Recall -  Beverage: water and zero sugar beverages  Who shops/cooks?: self/girlfriend  Patient can identify foods that impact blood sugar.: yes  Nutrition/Healthy Eating Skills Assessment Completed:: Yes  Assessment indicates:: Instruction Needed  Area of need?: Yes    Physical Activity/Exercise  Physical Activity/Exercise Skills Assessment Completed: : No  Deffered due to:: Time  Area of need?: Deferred    Home Blood Glucose Monitoring  Patient states that blood sugar is checked at home daily.: no  Home Blood Glucose Monitoring Skills Assessment Completed: : Yes  Assessment indicates:: Instruction Needed  Area of need?: Yes    Acute Complications  Have you ever had hypoglycemia (low BG 70 or less)?: no  Do you know the symptoms of low blood sugar and how to treat?: no  Have you ever had hyperglycemia (high  or more)?: yes  How often and what are your symptoms?: urination and thirst  Acute Complications Skills Assessment Completed: : Yes  Assessment indicates:: Instruction Needed  Area of need?: Yes    Chronic Complications  Reviewed health maintenance: no (see comments)  Chronic Complications Skills Assessment Completed: : No  Deferred due to:: Time  Area of need?: Deferred      Assessment Summary and Plan    Based on today's diabetes care assessment, the following areas of need were identified:      Identified Areas of Need      Medication/Current Diabetes Treatment: Yes pharm assistance referral sent   Lifestyle Coping/Support: Deferred   Diabetes Disease Process/Treatment Options: Deferred   Nutrition/Healthy Eating: Yes provided DM Mgmt guide   Physical Activity/Exercise: Deferred    Home Blood Glucose Monitoring: Yes see care plan   Acute Complications: Yes Reviewed blood glucose goals, prevention, detection, signs and symptoms, and treatment of hypoglycemia and hyperglycemia, and when to contact the clinic.   Chronic Complications: Deferred       Today's interventions were provided through individual discussion,  instruction, and written materials were provided.      Patient verbalized understanding of instruction and written materials.  Pt was able to return back demonstration of instructions today. Patient understood key points, needs reinforcement and further instruction.     Diabetes Self-Management Care Plan:    Today's Diabetes Self-Management Care Plan was developed with Rick's input. Rick has agreed to work toward the following goal(s) to improve his/her overall diabetes control.      Care Plan: Diabetes Management   Updates made since 4/10/2024 12:00 AM        Problem: Blood Glucose Self-Monitoring         Goal: Pt agrees to download Freestyle Chase 3 al if able to afford sensors with $75 copay    Start Date: 4/10/2025   Expected End Date: 5/8/2025   Priority: High   Barriers: No Barriers Identified   Note:    Provided $75 [employer] copay assistance voucher and phone #. He is not checking blood sugars during day due to finger pain and working in kitchen with hot oven items.       Task: sent Rx request to provider to send to patients pharmacy. Completed 4/10/2025        Task: Provided patient with blood glucose logs, reviewed appropriate timing and frequency to SMBG, education on parameters on when to notify provider and advised patient to bring logs to all appts with PCP/Endocrinologist/Diabetes Care Specialist. Completed 4/10/2025          Follow Up Plan     Follow up in about 4 weeks (around 5/8/2025) for activity, Psychosocial/Coping, health maintenance, Disease Process, chronic complications.    Today's care plan and follow up schedule was discussed with patient.  Rick verbalized understanding of the care plan, goals, and agrees to follow up plan.        The patient was encouraged to communicate with his/her health care provider/physician and care team regarding his/her condition(s) and treatment.  I provided the patient with my contact information today and encouraged to contact me via phone or [facility]  Patient Portal as needed.     Length of Visit   Total Time: 30 Minutes

## 2025-04-10 NOTE — Clinical Note
Rick Siddiqi wanted me to let you know that his Dexcom report is in his chart for your review for his visit today(see media). Nicole, his Dexcom cost $300 so he stopped wearing it. I gave him the $75 copay assistance voucher for Freestyle Chase and instructed him to download the Chase 3 al. Would you want to send a script to [facility] to see if he can use the voucher? I do not know of a Dexcom assistance program but I haven't seen the reps in a long time. If you have any other suggestions, please let me know. I also sent a pharmacy assistance referral for Januvia and insulin. States he is paying $30ish for Januvia but would like a reduced cost if possible.

## 2025-04-10 NOTE — TELEPHONE ENCOUNTER
Currently unable to assist with PAP enrollment for the following reason(s):      Requested medication prescribed by a [facility] and/or [facility] Provider. This region is currently out of scope for the Pharmacy Patient Assistance Team.  Patient has Private and/or Commercial insurance. However, patient may be eligible for co-pay cards. Advise patient to contact the following [employer] programs directly for assistance with obtaining co-pay cards         [employer]:  [phone]   [employer]: [phone]   [facility]: [phone]         Chel Michaels  Pharmacy Patient Assistance Team

## 2025-04-11 NOTE — TELEPHONE ENCOUNTER
Called and informed patient we do not have assistance cards for medication , informed pay be able to find online or a coupon.Patient verbalized understanding

## 2025-05-28 ENCOUNTER — HOSPITAL ENCOUNTER (EMERGENCY)
Facility: HOSPITAL | Age: 40
Discharge: HOME | End: 2025-05-28
Attending: FAMILY MEDICINE
Payer: COMMERCIAL

## 2025-05-28 VITALS
OXYGEN SATURATION: 99 % | HEART RATE: 101 BPM | WEIGHT: 299.38 LBS | TEMPERATURE: 98 F | HEIGHT: 69 IN | SYSTOLIC BLOOD PRESSURE: 130 MMHG | BODY MASS INDEX: 44.34 KG/M2 | RESPIRATION RATE: 16 BRPM | DIASTOLIC BLOOD PRESSURE: 82 MMHG

## 2025-05-28 DIAGNOSIS — L02.11 NECK ABSCESS: Primary | ICD-10-CM

## 2025-05-28 PROCEDURE — 96372 THER/PROPH/DIAG INJ SC/IM: CPT | Performed by: PHYSICIAN ASSISTANT

## 2025-05-28 PROCEDURE — 10060 I&D ABSCESS SIMPLE/SINGLE: CPT

## 2025-05-28 PROCEDURE — 63600175 PHARM REV CODE 636 W HCPCS: Mod: JZ,TB | Performed by: PHYSICIAN ASSISTANT

## 2025-05-28 PROCEDURE — 99284 EMERGENCY DEPT VISIT MOD MDM: CPT | Mod: 25

## 2025-05-28 RX ORDER — LIDOCAINE HYDROCHLORIDE 10 MG/ML
5 INJECTION, SOLUTION EPIDURAL; INFILTRATION; INTRACAUDAL; PERINEURAL
Status: COMPLETED | OUTPATIENT
Start: 2025-05-28 | End: 2025-05-28

## 2025-05-28 RX ORDER — KETOROLAC TROMETHAMINE 30 MG/ML
30 INJECTION, SOLUTION INTRAMUSCULAR; INTRAVENOUS
Status: COMPLETED | OUTPATIENT
Start: 2025-05-28 | End: 2025-05-28

## 2025-05-28 RX ORDER — SULFAMETHOXAZOLE AND TRIMETHOPRIM 800; 160 MG/1; MG/1
1 TABLET ORAL 2 TIMES DAILY
Qty: 14 TABLET | Refills: 0 | Status: SHIPPED | OUTPATIENT
Start: 2025-05-28 | End: 2025-06-04

## 2025-05-28 RX ADMIN — LIDOCAINE HYDROCHLORIDE 50 MG: 10 INJECTION, SOLUTION EPIDURAL; INFILTRATION; INTRACAUDAL; PERINEURAL at 05:05

## 2025-05-28 RX ADMIN — KETOROLAC TROMETHAMINE 30 MG: 60 INJECTION, SOLUTION INTRAMUSCULAR at 04:05

## 2025-05-28 NOTE — ED PROVIDER NOTES
Encounter Date: 5/28/2025       History     Chief Complaint   Patient presents with    Abscess     Reports abscess to R side of neck for past 3 days. Denies drainage.      Rick Coronado is a 39 y.o. male with a PMHx of HTN, HLD, and DM who presents to the ED with complaints of a large right neck abscess that started 3 day(s) ago. He denies drainage from the wound. States the wound got increasingly more painful last night.        The history is provided by the patient. No  was used.     Review of patient's allergies indicates:  No Known Allergies  Past Medical History:   Diagnosis Date    Diabetes mellitus     Hyperlipidemia     Hypertension      History reviewed. No pertinent surgical history.  Family History   Problem Relation Name Age of Onset    Diabetes Mother       Social History[1]  Review of Systems   Constitutional:  Negative for chills, fatigue and fever.   HENT:  Negative for congestion, ear pain, sinus pain and sore throat.    Eyes:  Negative for pain.   Respiratory:  Negative for cough, chest tightness and shortness of breath.    Cardiovascular:  Negative for chest pain.   Gastrointestinal:  Negative for abdominal pain, constipation, diarrhea, nausea and vomiting.   Genitourinary:  Negative for dysuria.   Musculoskeletal:  Negative for back pain and joint swelling.   Skin:  Positive for wound. Negative for color change and rash.   Neurological:  Negative for dizziness and weakness.   Psychiatric/Behavioral:  Negative for behavioral problems and confusion.        Physical Exam     Initial Vitals [05/28/25 1448]   BP Pulse Resp Temp SpO2   134/89 109 18 98 °F (36.7 °C) 97 %      MAP       --         Physical Exam    Nursing note and vitals reviewed.  Constitutional: He appears well-developed and well-nourished.   HENT:   Head: Normocephalic and atraumatic.   Right Ear: External ear normal.   Left Ear: External ear normal.   Nose: Nose normal.   Eyes: Conjunctivae and EOM are  normal.   Neck: Neck supple. No thyromegaly present. No tracheal deviation present.   Cardiovascular:  Normal rate, regular rhythm and normal heart sounds.     Exam reveals no gallop and no friction rub.       No murmur heard.  Pulmonary/Chest: Breath sounds normal. No respiratory distress. He has no wheezes. He has no rhonchi. He has no rales. He exhibits no tenderness.   Abdominal: Abdomen is soft. He exhibits no distension.   Musculoskeletal:      Cervical back: Neck supple.     Neurological: He is alert and oriented to person, place, and time. GCS score is 15. GCS eye subscore is 4. GCS verbal subscore is 5. GCS motor subscore is 6.   Skin: Skin is warm. Capillary refill takes less than 2 seconds. No rash and no abscess noted. No erythema. No pallor.   Large 3cm x 5 cm abscess to right base of neck, tender to palpation   Psychiatric: He has a normal mood and affect.         ED Course   I & D - Incision and Drainage    Date/Time: 2025 5:33 PM  Location procedure was performed: [facility] EMERGENCY DEPARTMENT    Performed by: Maryuri Santiago PA-C  Authorized by: Richmond Medina MD  Consent Done: Yes  Consent: Verbal consent obtained. Written consent not obtained  Risks and benefits: risks, benefits and alternatives were discussed  Consent given by: patient  Patient understanding: patient states understanding of the procedure being performed  Patient consent: the patient's understanding of the procedure matches consent given  Procedure consent: procedure consent matches procedure scheduled  Relevant documents: relevant documents present and verified  Test results: test results available and properly labeled  Site marked: the operative site was marked  Imaging studies: imaging studies available  Patient identity confirmed: name and   Type: abscess  Body area: head/neck  Location details: neck  Anesthesia method: none.    Patient sedated: no  Complexity: simple  Drainage: pus  Drainage amount:  copious  Wound treatment: wound left open  Technical procedures used: when cleaning with betadine swab, abscess began to drain. able to express copious amounts of pus without using scalpel  Complications: No  Specimens: No  Implants: No  Patient tolerance: Patient tolerated the procedure well with no immediate complications        Labs Reviewed - No data to display       Imaging Results    None          Medications   ketorolac injection 30 mg (30 mg Intramuscular Given 5/28/25 1627)   LIDOcaine (PF) 10 mg/ml (1%) injection 50 mg (50 mg Infiltration Given by Provider 5/28/25 1720)     Medical Decision Making  DDX: cyst, abscess, lymphadenopathy, among others    Rick Coronado is a 39 y.o. male with a PMHx of HTN, HLD, and DM who presents to the ED with complaints of a large right neck abscess that started 3 day(s) ago. He denies drainage from the wound. States the wound got increasingly more painful last night.      Hospital Course: On exam, I went to clean the wound prior to I&D and it began draining. I was able to drain copious amounts of pus from the wound. He tolerated well. Patient states he did not want packing, and opening was too small to put packing in. Will DC home with Bactrim and gave strict return precautions. Stable for discharge.     Risk  Prescription drug management.                                      Clinical Impression:  Final diagnoses:  [L02.11] Neck abscess (Primary)          ED Disposition Condition    Discharge Stable          ED Prescriptions       Medication Sig Dispense Start Date End Date Auth. Provider    sulfamethoxazole-trimethoprim 800-160mg (BACTRIM DS) 800-160 mg Tab Take 1 tablet by mouth 2 (two) times daily. for 7 days 14 tablet 5/28/2025 6/4/2025 Maryuri Santiago PA-C          Follow-up Information       Follow up With Specialties Details Why Contact Info    Davey Reyes MD Internal Medicine   [address] [address]  [phone]       [facility] Emergency Dept Emergency Medicine In 3 days As needed, If symptoms worsen [address] [facility] [address] [phone]                   [1]   Social History  Tobacco Use    Smoking status: Never     Passive exposure: Never    Smokeless tobacco: Never   Substance Use Topics    Alcohol use: Never    Drug use: Never        Maryuri Santiago PA-C  06/07/25 0154

## 2025-05-28 NOTE — Clinical Note
"Rick Gonzalezy"[place] was seen and treated in our emergency department on 5/28/2025.  He may return to work on 05/29/2025.       If you have any questions or concerns, please don't hesitate to call.      Maryuri Santiago PA-C"

## 2025-06-16 ENCOUNTER — OFFICE VISIT (OUTPATIENT)
Dept: ENDOCRINOLOGY | Facility: CLINIC | Age: 40
End: 2025-06-16
Payer: COMMERCIAL

## 2025-06-16 VITALS
HEART RATE: 76 BPM | BODY MASS INDEX: 44.79 KG/M2 | SYSTOLIC BLOOD PRESSURE: 137 MMHG | DIASTOLIC BLOOD PRESSURE: 91 MMHG | RESPIRATION RATE: 18 BRPM | HEIGHT: 69 IN | TEMPERATURE: 97 F | WEIGHT: 302.38 LBS

## 2025-06-16 DIAGNOSIS — E11.65 UNCONTROLLED TYPE 2 DIABETES MELLITUS WITH HYPERGLYCEMIA: Primary | ICD-10-CM

## 2025-06-16 LAB — HBA1C MFR BLD: 11.3 %

## 2025-06-16 PROCEDURE — 99213 OFFICE O/P EST LOW 20 MIN: CPT | Mod: S$PBB,,, | Performed by: NURSE PRACTITIONER

## 2025-06-16 PROCEDURE — 4010F ACE/ARB THERAPY RXD/TAKEN: CPT | Mod: CPTII,,, | Performed by: NURSE PRACTITIONER

## 2025-06-16 PROCEDURE — 3075F SYST BP GE 130 - 139MM HG: CPT | Mod: CPTII,,, | Performed by: NURSE PRACTITIONER

## 2025-06-16 PROCEDURE — 3008F BODY MASS INDEX DOCD: CPT | Mod: CPTII,,, | Performed by: NURSE PRACTITIONER

## 2025-06-16 PROCEDURE — 3046F HEMOGLOBIN A1C LEVEL >9.0%: CPT | Mod: CPTII,,, | Performed by: NURSE PRACTITIONER

## 2025-06-16 PROCEDURE — 1159F MED LIST DOCD IN RCRD: CPT | Mod: CPTII,,, | Performed by: NURSE PRACTITIONER

## 2025-06-16 PROCEDURE — 99214 OFFICE O/P EST MOD 30 MIN: CPT | Mod: PBBFAC | Performed by: NURSE PRACTITIONER

## 2025-06-16 PROCEDURE — 1160F RVW MEDS BY RX/DR IN RCRD: CPT | Mod: CPTII,,, | Performed by: NURSE PRACTITIONER

## 2025-06-16 PROCEDURE — 3080F DIAST BP >= 90 MM HG: CPT | Mod: CPTII,,, | Performed by: NURSE PRACTITIONER

## 2025-06-16 PROCEDURE — 83036 HEMOGLOBIN GLYCOSYLATED A1C: CPT | Mod: PBBFAC | Performed by: NURSE PRACTITIONER

## 2025-06-16 RX ORDER — BLOOD-GLUCOSE SENSOR
EACH MISCELLANEOUS
Qty: 3 EACH | Refills: 11 | Status: SHIPPED | OUTPATIENT
Start: 2025-06-16

## 2025-06-16 NOTE — PROGRESS NOTES
Subjective     Patient ID: Rick Coronado is a 39 y.o. male.    Chief Complaint: Diabetes    Endocrine clinic note 03/11/2025:  39-year-old male scheduled today endocrine clinic as new patient referral for history of uncontrolled type 2 diabetes.  Uncontrolled type 2 diabetes recent A1c 14.2.  Patient has been on multiple medications with no improvement in A1c over the last year.  Patient reports he is taking metformin only has side effects when initially starting such as diarrhea improves after taking metformin.  Patient states in the past he took Jardiance but threw up the 1st 2 weeks time Jardiance and then stopped the medication.  Patient is on Lantus 50 units and states that he recently started NovoLog 16 units with meals just recently.  Patient states he is making dietary changes and has started cutting out sugary beverages and has been eating rice and potatoes but has been working on cutting out rice and potatoes.  Instructed the patient today will run a C-peptide and ensure that patient has a type 2 diabetic before changing his regimen.  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     Endocrine clinic note 06/16/2025:  39-year-old male scheduled today for endocrine clinic follow-up.  History of uncontrolled type 2 diabetes.  Uncontrolled type 2 diabetes current A1c improved to 11.9 from previous 14.2.  Patient was started on Januvia 100 mg and states he has been tolerating.  Patient has a inconsistently taking insulin due to his cost.  Patient picked up Lantus today and pay 24 dollars with the boxes Lantus and states he has Humalog at home.  He states currently he is having difficulty affording medications due to his high deductibles and high cost of medications with his insurance.  Patient's you in [facility] pharmacy.  Previously patient could not obtain his  Dexcom G7 due to the cost being 300 dollars patient was given information today for Dexcom patient assistance call Dexcom assistance to see if he qualifies.      Review of Systems   Constitutional:  Negative for activity change, appetite change, chills and fatigue.   HENT: Negative.  Negative for dental problem, hearing loss, rhinorrhea, sneezing and goiter.    Eyes: Negative.  Negative for photophobia and visual disturbance.   Respiratory: Negative.  Negative for cough, chest tightness and shortness of breath.    Cardiovascular:  Negative for chest pain, palpitations and leg swelling.   Gastrointestinal: Negative.  Negative for abdominal distention, abdominal pain, change in bowel habit, constipation, diarrhea, nausea and vomiting.   Endocrine: Negative.  Negative for cold intolerance, heat intolerance, polydipsia, polyphagia and polyuria.   Genitourinary: Negative.  Negative for difficulty urinating and erectile dysfunction.   Musculoskeletal:  Negative for back pain, joint swelling, leg pain, myalgias and joint deformity.   Integumentary:  Negative for color change, pallor and rash. Negative.   Allergic/Immunologic: Negative.  Negative for environmental allergies, food allergies and frequent infections.   Neurological: Negative.  Negative for tremors, syncope, headaches and coordination difficulties.   Hematological: Negative.  Does not bruise/bleed easily.   Psychiatric/Behavioral:  Negative for agitation and behavioral problems. The patient is not nervous/anxious and is not hyperactive.           Objective     Physical Exam  Constitutional:       General: He is not in acute distress.     Appearance: Normal appearance. He is normal weight. He is not ill-appearing.   HENT:      Head: Normocephalic.      Right Ear: External ear normal.      Left Ear: External ear normal.      Nose: No congestion or rhinorrhea.      Mouth/Throat:      Mouth: Mucous membranes are moist.      Pharynx: Oropharynx is clear.   Eyes:       Conjunctiva/sclera: Conjunctivae normal.      Pupils: Pupils are equal, round, and reactive to light.   Neck:      Thyroid: No thyroid mass, thyromegaly or thyroid tenderness.   Cardiovascular:      Rate and Rhythm: Normal rate and regular rhythm.      Pulses: Normal pulses.      Heart sounds: Normal heart sounds. No murmur heard.  Pulmonary:      Effort: Pulmonary effort is normal. No respiratory distress.      Breath sounds: Normal breath sounds.   Abdominal:      General: Abdomen is flat. Bowel sounds are normal. There is no distension.      Palpations: Abdomen is soft.      Tenderness: There is no abdominal tenderness.   Genitourinary:     Testes: Normal.   Musculoskeletal:         General: Normal range of motion.      Cervical back: Normal range of motion and neck supple.      Right lower leg: No edema.      Left lower leg: No edema.   Feet:      Right foot:      Skin integrity: Skin integrity normal.      Left foot:      Skin integrity: Skin integrity normal.   Lymphadenopathy:      Cervical: No cervical adenopathy.   Skin:     General: Skin is warm and dry.      Coloration: Skin is not jaundiced or pale.   Neurological:      General: No focal deficit present.      Mental Status: He is alert and oriented to person, place, and time.      Motor: No weakness.      Coordination: Coordination normal.      Gait: Gait normal.   Psychiatric:         Mood and Affect: Mood normal.         Behavior: Behavior normal.         Thought Content: Thought content normal.         Judgment: Judgment normal.            Assessment and Plan     1. Uncontrolled type 2 diabetes mellitus with hyperglycemia  Current A1C 11.3   A1C goal <7.0   C-peptid 3.5, MIHAELA-65 0.01 on 04/10/2025    Medications: Januvia 100 mg, Lantus 50 units once a day, Humalog 16 units TID Changes: Take medications regularly, Call dexcom assistance program   Yearly Diabetic Foot Exam: 05/03/2024       Component  Ref Range & Units (hover) 3 mo ago   C-Peptide, S  3.5            Component  Ref Range & Units (hover) 3 mo ago   GAD65 Ab Assay, S 0.01         Dispensed Days Supply Quantity Provider Pharmacy   insulin lispro (U-100) 100 unit/mL subcutaneous pen 03/06/2025 31 15 mL Davey Reyes MD [facility] an...   Humalog KwikPen (U-100) Insulin 100 unit/mL subcutaneous 01/27/2025 31 15 mL Davey Reyes MD [facility] an...   Humalog KwikPen (U-100) Insulin 100 unit/mL subcutaneous 12/16/2024 31 15 mL Davey Reyes MD [facility] an...   nsulin glargine,hum.rec.[email or web address]     Dispensed Days Supply Quantity Provider Pharmacy   Lantus Solostar U-100 Insulin 100 unit/mL (3 mL) subcutaneous pen 05/01/2025 30 15 mL Davey Reyes MD [facility] an...   Lantus Solostar U-100 Insulin 100 unit/mL (3 mL) subcutaneous pen 03/06/2025 30 15 mL Davey Reyes MD [facility] an...   Lantus Solostar U-100 Insulin 100 unit/mL (3 mL) subcutaneous pen 01/27/2025 30 15 mL Davey Reyes MD [facility] an...             Follow up in about 4 months (around 10/16/2025) for Type 2 diabetes.